# Patient Record
Sex: MALE | Race: WHITE | Employment: OTHER | URBAN - METROPOLITAN AREA
[De-identification: names, ages, dates, MRNs, and addresses within clinical notes are randomized per-mention and may not be internally consistent; named-entity substitution may affect disease eponyms.]

---

## 2017-01-03 ENCOUNTER — TELEPHONE (OUTPATIENT)
Dept: INTERNAL MEDICINE | Age: 82
End: 2017-01-03

## 2017-01-17 ENCOUNTER — NURSE ONLY (OUTPATIENT)
Dept: CARDIOLOGY CLINIC | Age: 82
End: 2017-01-17

## 2017-01-17 DIAGNOSIS — I47.29 PAROXYSMAL VENTRICULAR TACHYCARDIA: ICD-10-CM

## 2017-01-17 DIAGNOSIS — Z95.810 AUTOMATIC IMPLANTABLE CARDIOVERTER-DEFIBRILLATOR IN SITU: ICD-10-CM

## 2017-01-17 PROCEDURE — 93295 DEV INTERROG REMOTE 1/2/MLT: CPT | Performed by: INTERNAL MEDICINE

## 2017-01-17 PROCEDURE — 93296 REM INTERROG EVL PM/IDS: CPT | Performed by: INTERNAL MEDICINE

## 2017-01-19 ENCOUNTER — OFFICE VISIT (OUTPATIENT)
Dept: INTERNAL MEDICINE | Age: 82
End: 2017-01-19

## 2017-01-19 ENCOUNTER — CARE COORDINATOR VISIT (OUTPATIENT)
Dept: INTERNAL MEDICINE | Age: 82
End: 2017-01-19

## 2017-01-19 VITALS
SYSTOLIC BLOOD PRESSURE: 124 MMHG | HEIGHT: 67 IN | DIASTOLIC BLOOD PRESSURE: 70 MMHG | BODY MASS INDEX: 20.25 KG/M2 | WEIGHT: 129 LBS

## 2017-01-19 DIAGNOSIS — I10 ESSENTIAL HYPERTENSION: Primary | ICD-10-CM

## 2017-01-19 DIAGNOSIS — N18.4 CHRONIC KIDNEY DISEASE (CKD), STAGE IV (SEVERE) (HCC): ICD-10-CM

## 2017-01-19 DIAGNOSIS — I50.22 CHRONIC SYSTOLIC CONGESTIVE HEART FAILURE (HCC): ICD-10-CM

## 2017-01-19 DIAGNOSIS — D50.8 OTHER IRON DEFICIENCY ANEMIA: ICD-10-CM

## 2017-01-19 DIAGNOSIS — N18.4 CKD (CHRONIC KIDNEY DISEASE), STAGE 4 (SEVERE): ICD-10-CM

## 2017-01-19 DIAGNOSIS — I47.20 VENTRICULAR TACHYARRHYTHMIA: ICD-10-CM

## 2017-01-19 DIAGNOSIS — E03.4 HYPOTHYROIDISM DUE TO ACQUIRED ATROPHY OF THYROID: ICD-10-CM

## 2017-01-19 DIAGNOSIS — I47.1 PAT (PAROXYSMAL ATRIAL TACHYCARDIA) (HCC): ICD-10-CM

## 2017-01-19 LAB
ALBUMIN SERPL-MCNC: 3.6 G/DL (ref 3.4–5)
ANION GAP SERPL CALCULATED.3IONS-SCNC: 12 MMOL/L (ref 3–16)
BASOPHILS ABSOLUTE: 0 K/UL (ref 0–0.2)
BASOPHILS RELATIVE PERCENT: 0.7 %
BUN BLDV-MCNC: 23 MG/DL (ref 7–20)
CALCIUM SERPL-MCNC: 8.8 MG/DL (ref 8.3–10.6)
CHLORIDE BLD-SCNC: 102 MMOL/L (ref 99–110)
CO2: 30 MMOL/L (ref 21–32)
CREAT SERPL-MCNC: 1.6 MG/DL (ref 0.8–1.3)
EOSINOPHILS ABSOLUTE: 0.1 K/UL (ref 0–0.6)
EOSINOPHILS RELATIVE PERCENT: 2.4 %
GFR AFRICAN AMERICAN: 49
GFR NON-AFRICAN AMERICAN: 41
GLUCOSE BLD-MCNC: 74 MG/DL (ref 70–99)
HCT VFR BLD CALC: 34.3 % (ref 40.5–52.5)
HEMOGLOBIN: 11.3 G/DL (ref 13.5–17.5)
LYMPHOCYTES ABSOLUTE: 1.3 K/UL (ref 1–5.1)
LYMPHOCYTES RELATIVE PERCENT: 26 %
MCH RBC QN AUTO: 32.1 PG (ref 26–34)
MCHC RBC AUTO-ENTMCNC: 32.9 G/DL (ref 31–36)
MCV RBC AUTO: 97.5 FL (ref 80–100)
MONOCYTES ABSOLUTE: 0.6 K/UL (ref 0–1.3)
MONOCYTES RELATIVE PERCENT: 11.4 %
NEUTROPHILS ABSOLUTE: 2.9 K/UL (ref 1.7–7.7)
NEUTROPHILS RELATIVE PERCENT: 59.5 %
PDW BLD-RTO: 16.8 % (ref 12.4–15.4)
PHOSPHORUS: 3.7 MG/DL (ref 2.5–4.9)
PLATELET # BLD: 188 K/UL (ref 135–450)
PMV BLD AUTO: 8 FL (ref 5–10.5)
POTASSIUM SERPL-SCNC: 4.2 MMOL/L (ref 3.5–5.1)
RBC # BLD: 3.51 M/UL (ref 4.2–5.9)
SODIUM BLD-SCNC: 144 MMOL/L (ref 136–145)
T4 FREE: 1.5 NG/DL (ref 0.9–1.8)
TSH SERPL DL<=0.05 MIU/L-ACNC: 7.3 UIU/ML (ref 0.27–4.2)
WBC # BLD: 4.8 K/UL (ref 4–11)

## 2017-01-19 PROCEDURE — G8419 CALC BMI OUT NRM PARAM NOF/U: HCPCS | Performed by: INTERNAL MEDICINE

## 2017-01-19 PROCEDURE — 4040F PNEUMOC VAC/ADMIN/RCVD: CPT | Performed by: INTERNAL MEDICINE

## 2017-01-19 PROCEDURE — 99214 OFFICE O/P EST MOD 30 MIN: CPT | Performed by: INTERNAL MEDICINE

## 2017-01-19 PROCEDURE — G8598 ASA/ANTIPLAT THER USED: HCPCS | Performed by: INTERNAL MEDICINE

## 2017-01-19 PROCEDURE — G8484 FLU IMMUNIZE NO ADMIN: HCPCS | Performed by: INTERNAL MEDICINE

## 2017-01-19 PROCEDURE — 1123F ACP DISCUSS/DSCN MKR DOCD: CPT | Performed by: INTERNAL MEDICINE

## 2017-01-19 PROCEDURE — 1036F TOBACCO NON-USER: CPT | Performed by: INTERNAL MEDICINE

## 2017-01-19 PROCEDURE — G8427 DOCREV CUR MEDS BY ELIG CLIN: HCPCS | Performed by: INTERNAL MEDICINE

## 2017-01-19 ASSESSMENT — ENCOUNTER SYMPTOMS
ABDOMINAL PAIN: 0
BACK PAIN: 0
EYE REDNESS: 0
SHORTNESS OF BREATH: 0
CHEST TIGHTNESS: 0
NAUSEA: 0

## 2017-02-02 ENCOUNTER — TELEPHONE (OUTPATIENT)
Dept: INTERNAL MEDICINE | Age: 82
End: 2017-02-02

## 2017-02-17 ENCOUNTER — CARE COORDINATION (OUTPATIENT)
Dept: INTERNAL MEDICINE | Age: 82
End: 2017-02-17

## 2017-02-22 ENCOUNTER — TELEPHONE (OUTPATIENT)
Dept: INTERNAL MEDICINE | Age: 82
End: 2017-02-22

## 2017-02-22 RX ORDER — ALPRAZOLAM 0.25 MG/1
TABLET ORAL
Qty: 60 TABLET | Refills: 0 | Status: SHIPPED | OUTPATIENT
Start: 2017-02-22 | End: 2017-06-30 | Stop reason: SDUPTHER

## 2017-03-17 ENCOUNTER — CARE COORDINATOR VISIT (OUTPATIENT)
Dept: INTERNAL MEDICINE | Age: 82
End: 2017-03-17

## 2017-04-11 ENCOUNTER — CARE COORDINATION (OUTPATIENT)
Dept: INTERNAL MEDICINE | Age: 82
End: 2017-04-11

## 2017-04-13 ENCOUNTER — PROCEDURE VISIT (OUTPATIENT)
Dept: CARDIOLOGY CLINIC | Age: 82
End: 2017-04-13

## 2017-04-13 ENCOUNTER — TELEPHONE (OUTPATIENT)
Dept: INTERNAL MEDICINE | Age: 82
End: 2017-04-13

## 2017-04-13 ENCOUNTER — OFFICE VISIT (OUTPATIENT)
Dept: CARDIOLOGY CLINIC | Age: 82
End: 2017-04-13

## 2017-04-13 VITALS
BODY MASS INDEX: 20.18 KG/M2 | DIASTOLIC BLOOD PRESSURE: 70 MMHG | SYSTOLIC BLOOD PRESSURE: 122 MMHG | HEART RATE: 91 BPM | WEIGHT: 128.6 LBS

## 2017-04-13 DIAGNOSIS — Z95.810 AUTOMATIC IMPLANTABLE CARDIOVERTER-DEFIBRILLATOR IN SITU: ICD-10-CM

## 2017-04-13 DIAGNOSIS — I65.29 STENOSIS OF CAROTID ARTERY, UNSPECIFIED LATERALITY: Primary | Chronic | ICD-10-CM

## 2017-04-13 DIAGNOSIS — I47.29 PAROXYSMAL VENTRICULAR TACHYCARDIA: ICD-10-CM

## 2017-04-13 PROCEDURE — 93283 PRGRMG EVAL IMPLANTABLE DFB: CPT | Performed by: INTERNAL MEDICINE

## 2017-04-13 PROCEDURE — 99214 OFFICE O/P EST MOD 30 MIN: CPT | Performed by: INTERNAL MEDICINE

## 2017-04-13 PROCEDURE — G8427 DOCREV CUR MEDS BY ELIG CLIN: HCPCS | Performed by: INTERNAL MEDICINE

## 2017-04-13 PROCEDURE — G8598 ASA/ANTIPLAT THER USED: HCPCS | Performed by: INTERNAL MEDICINE

## 2017-04-13 PROCEDURE — 1123F ACP DISCUSS/DSCN MKR DOCD: CPT | Performed by: INTERNAL MEDICINE

## 2017-04-13 PROCEDURE — 1036F TOBACCO NON-USER: CPT | Performed by: INTERNAL MEDICINE

## 2017-04-13 PROCEDURE — 4040F PNEUMOC VAC/ADMIN/RCVD: CPT | Performed by: INTERNAL MEDICINE

## 2017-04-13 PROCEDURE — G8419 CALC BMI OUT NRM PARAM NOF/U: HCPCS | Performed by: INTERNAL MEDICINE

## 2017-04-19 ENCOUNTER — OFFICE VISIT (OUTPATIENT)
Dept: INTERNAL MEDICINE | Age: 82
End: 2017-04-19

## 2017-04-19 VITALS
DIASTOLIC BLOOD PRESSURE: 70 MMHG | SYSTOLIC BLOOD PRESSURE: 140 MMHG | WEIGHT: 123 LBS | BODY MASS INDEX: 19.77 KG/M2 | HEIGHT: 66 IN

## 2017-04-19 DIAGNOSIS — F41.9 ANXIETY: ICD-10-CM

## 2017-04-19 DIAGNOSIS — I10 ESSENTIAL HYPERTENSION: Primary | ICD-10-CM

## 2017-04-19 PROCEDURE — 1036F TOBACCO NON-USER: CPT | Performed by: INTERNAL MEDICINE

## 2017-04-19 PROCEDURE — G8419 CALC BMI OUT NRM PARAM NOF/U: HCPCS | Performed by: INTERNAL MEDICINE

## 2017-04-19 PROCEDURE — G8427 DOCREV CUR MEDS BY ELIG CLIN: HCPCS | Performed by: INTERNAL MEDICINE

## 2017-04-19 PROCEDURE — G8598 ASA/ANTIPLAT THER USED: HCPCS | Performed by: INTERNAL MEDICINE

## 2017-04-19 PROCEDURE — 4040F PNEUMOC VAC/ADMIN/RCVD: CPT | Performed by: INTERNAL MEDICINE

## 2017-04-19 PROCEDURE — 1123F ACP DISCUSS/DSCN MKR DOCD: CPT | Performed by: INTERNAL MEDICINE

## 2017-04-19 PROCEDURE — 99214 OFFICE O/P EST MOD 30 MIN: CPT | Performed by: INTERNAL MEDICINE

## 2017-04-19 ASSESSMENT — ENCOUNTER SYMPTOMS
EYE REDNESS: 0
BACK PAIN: 0
CHEST TIGHTNESS: 0
ABDOMINAL PAIN: 0
SHORTNESS OF BREATH: 0
NAUSEA: 0

## 2017-06-08 ENCOUNTER — CARE COORDINATION (OUTPATIENT)
Dept: INTERNAL MEDICINE | Age: 82
End: 2017-06-08

## 2017-06-11 ENCOUNTER — CARE COORDINATION (OUTPATIENT)
Dept: CASE MANAGEMENT | Age: 82
End: 2017-06-11

## 2017-06-12 ENCOUNTER — TELEPHONE (OUTPATIENT)
Dept: PHARMACY | Facility: CLINIC | Age: 82
End: 2017-06-12

## 2017-06-13 ENCOUNTER — CARE COORDINATION (OUTPATIENT)
Dept: CASE MANAGEMENT | Age: 82
End: 2017-06-13

## 2017-06-13 ENCOUNTER — TELEPHONE (OUTPATIENT)
Dept: INTERNAL MEDICINE | Age: 82
End: 2017-06-13

## 2017-06-16 ENCOUNTER — CARE COORDINATION (OUTPATIENT)
Dept: INTERNAL MEDICINE | Age: 82
End: 2017-06-16

## 2017-06-16 ENCOUNTER — OFFICE VISIT (OUTPATIENT)
Dept: INTERNAL MEDICINE | Age: 82
End: 2017-06-16

## 2017-06-16 VITALS
DIASTOLIC BLOOD PRESSURE: 64 MMHG | HEIGHT: 66 IN | WEIGHT: 125 LBS | SYSTOLIC BLOOD PRESSURE: 130 MMHG | BODY MASS INDEX: 20.09 KG/M2

## 2017-06-16 DIAGNOSIS — I50.33 ACUTE ON CHRONIC DIASTOLIC HEART FAILURE (HCC): Primary | ICD-10-CM

## 2017-06-16 PROCEDURE — 99496 TRANSJ CARE MGMT HIGH F2F 7D: CPT | Performed by: INTERNAL MEDICINE

## 2017-06-16 ASSESSMENT — ENCOUNTER SYMPTOMS
NAUSEA: 0
BACK PAIN: 0
ABDOMINAL PAIN: 0
CHEST TIGHTNESS: 0
EYE REDNESS: 0
SHORTNESS OF BREATH: 0

## 2017-06-20 ENCOUNTER — CARE COORDINATION (OUTPATIENT)
Dept: CASE MANAGEMENT | Age: 82
End: 2017-06-20

## 2017-06-27 ENCOUNTER — PROCEDURE VISIT (OUTPATIENT)
Dept: CARDIOLOGY CLINIC | Age: 82
End: 2017-06-27

## 2017-06-27 ENCOUNTER — OFFICE VISIT (OUTPATIENT)
Dept: CARDIOLOGY CLINIC | Age: 82
End: 2017-06-27

## 2017-06-27 VITALS
HEIGHT: 68 IN | RESPIRATION RATE: 16 BRPM | HEART RATE: 88 BPM | SYSTOLIC BLOOD PRESSURE: 116 MMHG | BODY MASS INDEX: 19.04 KG/M2 | DIASTOLIC BLOOD PRESSURE: 64 MMHG | WEIGHT: 125.6 LBS

## 2017-06-27 DIAGNOSIS — I47.29 PAROXYSMAL VENTRICULAR TACHYCARDIA: ICD-10-CM

## 2017-06-27 DIAGNOSIS — I25.10 CORONARY ARTERY DISEASE INVOLVING NATIVE HEART WITHOUT ANGINA PECTORIS, UNSPECIFIED VESSEL OR LESION TYPE: ICD-10-CM

## 2017-06-27 DIAGNOSIS — I50.42 CHRONIC COMBINED SYSTOLIC AND DIASTOLIC CONGESTIVE HEART FAILURE (HCC): Primary | ICD-10-CM

## 2017-06-27 DIAGNOSIS — I42.9 CARDIOMYOPATHY (HCC): ICD-10-CM

## 2017-06-27 DIAGNOSIS — Z95.810 AUTOMATIC IMPLANTABLE CARDIOVERTER-DEFIBRILLATOR IN SITU: ICD-10-CM

## 2017-06-27 DIAGNOSIS — Z95.810 ICD (IMPLANTABLE CARDIOVERTER-DEFIBRILLATOR), DUAL, IN SITU: ICD-10-CM

## 2017-06-27 PROCEDURE — G8427 DOCREV CUR MEDS BY ELIG CLIN: HCPCS | Performed by: NURSE PRACTITIONER

## 2017-06-27 PROCEDURE — 93283 PRGRMG EVAL IMPLANTABLE DFB: CPT | Performed by: INTERNAL MEDICINE

## 2017-06-27 PROCEDURE — 4040F PNEUMOC VAC/ADMIN/RCVD: CPT | Performed by: NURSE PRACTITIONER

## 2017-06-27 PROCEDURE — 99214 OFFICE O/P EST MOD 30 MIN: CPT | Performed by: NURSE PRACTITIONER

## 2017-06-27 PROCEDURE — G8598 ASA/ANTIPLAT THER USED: HCPCS | Performed by: NURSE PRACTITIONER

## 2017-06-27 PROCEDURE — 1123F ACP DISCUSS/DSCN MKR DOCD: CPT | Performed by: NURSE PRACTITIONER

## 2017-06-27 PROCEDURE — 1111F DSCHRG MED/CURRENT MED MERGE: CPT | Performed by: NURSE PRACTITIONER

## 2017-06-27 PROCEDURE — 1036F TOBACCO NON-USER: CPT | Performed by: NURSE PRACTITIONER

## 2017-06-27 PROCEDURE — G8420 CALC BMI NORM PARAMETERS: HCPCS | Performed by: NURSE PRACTITIONER

## 2017-06-28 ENCOUNTER — CARE COORDINATION (OUTPATIENT)
Dept: INTERNAL MEDICINE | Age: 82
End: 2017-06-28

## 2017-06-30 RX ORDER — ALPRAZOLAM 0.25 MG/1
TABLET ORAL
Qty: 60 TABLET | Refills: 1 | OUTPATIENT
Start: 2017-06-30 | End: 2017-11-02 | Stop reason: SDUPTHER

## 2017-07-13 ENCOUNTER — CARE COORDINATION (OUTPATIENT)
Dept: INTERNAL MEDICINE | Age: 82
End: 2017-07-13

## 2017-08-01 RX ORDER — METOPROLOL SUCCINATE 25 MG/1
TABLET, EXTENDED RELEASE ORAL
Qty: 60 TABLET | Refills: 10 | Status: SHIPPED | OUTPATIENT
Start: 2017-08-01 | End: 2017-11-02 | Stop reason: SDUPTHER

## 2017-08-24 ENCOUNTER — CARE COORDINATION (OUTPATIENT)
Dept: INTERNAL MEDICINE | Age: 82
End: 2017-08-24

## 2017-09-27 ENCOUNTER — CARE COORDINATOR VISIT (OUTPATIENT)
Dept: INTERNAL MEDICINE | Age: 82
End: 2017-09-27

## 2017-10-19 ENCOUNTER — PROCEDURE VISIT (OUTPATIENT)
Dept: CARDIOLOGY CLINIC | Age: 82
End: 2017-10-19

## 2017-10-19 DIAGNOSIS — Z95.810 AUTOMATIC IMPLANTABLE CARDIOVERTER-DEFIBRILLATOR IN SITU: ICD-10-CM

## 2017-10-19 DIAGNOSIS — I47.29 PAROXYSMAL VENTRICULAR TACHYCARDIA: ICD-10-CM

## 2017-10-19 PROCEDURE — 93283 PRGRMG EVAL IMPLANTABLE DFB: CPT | Performed by: INTERNAL MEDICINE

## 2017-10-20 ENCOUNTER — OFFICE VISIT (OUTPATIENT)
Dept: INTERNAL MEDICINE | Age: 82
End: 2017-10-20

## 2017-10-20 VITALS
HEART RATE: 77 BPM | SYSTOLIC BLOOD PRESSURE: 148 MMHG | DIASTOLIC BLOOD PRESSURE: 86 MMHG | WEIGHT: 133.6 LBS | RESPIRATION RATE: 18 BRPM | OXYGEN SATURATION: 98 % | BODY MASS INDEX: 20.31 KG/M2

## 2017-10-20 DIAGNOSIS — F41.9 ANXIETY: ICD-10-CM

## 2017-10-20 DIAGNOSIS — I10 ESSENTIAL HYPERTENSION: Primary | ICD-10-CM

## 2017-10-20 PROCEDURE — 1123F ACP DISCUSS/DSCN MKR DOCD: CPT | Performed by: INTERNAL MEDICINE

## 2017-10-20 PROCEDURE — G8598 ASA/ANTIPLAT THER USED: HCPCS | Performed by: INTERNAL MEDICINE

## 2017-10-20 PROCEDURE — G8427 DOCREV CUR MEDS BY ELIG CLIN: HCPCS | Performed by: INTERNAL MEDICINE

## 2017-10-20 PROCEDURE — 4040F PNEUMOC VAC/ADMIN/RCVD: CPT | Performed by: INTERNAL MEDICINE

## 2017-10-20 PROCEDURE — G8484 FLU IMMUNIZE NO ADMIN: HCPCS | Performed by: INTERNAL MEDICINE

## 2017-10-20 PROCEDURE — 99213 OFFICE O/P EST LOW 20 MIN: CPT | Performed by: INTERNAL MEDICINE

## 2017-10-20 PROCEDURE — 1036F TOBACCO NON-USER: CPT | Performed by: INTERNAL MEDICINE

## 2017-10-20 PROCEDURE — G8420 CALC BMI NORM PARAMETERS: HCPCS | Performed by: INTERNAL MEDICINE

## 2017-10-20 ASSESSMENT — ENCOUNTER SYMPTOMS
ABDOMINAL PAIN: 0
EYE REDNESS: 0
NAUSEA: 0
BACK PAIN: 0
CHEST TIGHTNESS: 0
SHORTNESS OF BREATH: 0

## 2017-10-20 NOTE — PROGRESS NOTES
Subjective:      Patient ID: Ewa Boucher is a 80 y.o. male. Hypertension   This is a chronic problem. The current episode started more than 1 year ago. The problem is controlled. Pertinent negatives include no chest pain, headaches, neck pain, palpitations, peripheral edema or shortness of breath. There are no associated agents to hypertension. Past treatments include beta blockers and diuretics. The current treatment provides no improvement. There are no compliance problems. Current Outpatient Prescriptions on File Prior to Visit   Medication Sig Dispense Refill    metoprolol succinate (TOPROL XL) 25 MG extended release tablet TAKE 1 TABLET BY MOUTH 2 TIMES DAILY 60 tablet 10    ALPRAZolam (XANAX) 0.25 MG tablet TAKE 1 TABLET BY MOUTH TWO TIMES DAILY AS NEEDED FOR SLEEP/ANXIETY 60 tablet 1    furosemide (LASIX) 40 MG tablet Take 1 tablet by mouth daily 60 tablet 3    Acetaminophen (APAP) 325 MG TABS Take 650 mg by mouth every 6 hours as needed      Melatonin 5 MG CAPS TAKE 1 CAPSULE BY MOUTH NIGHTLY AS NEEDED FOR SLEEP 30 capsule 5    allopurinol (ZYLOPRIM) 100 MG tablet Take 1 tablet by mouth daily 90 tablet 3    levothyroxine (SYNTHROID) 100 MCG tablet Take 1 tablet by mouth daily 90 tablet 3    fluticasone (FLONASE) 50 MCG/ACT nasal spray 2 sprays by Nasal route daily 1 Bottle 3    escitalopram (LEXAPRO) 5 MG tablet TAKE 1 TABLET BY MOUTH DAILY TO HELP ANXIETY 90 tablet 5    Multiple Vitamins-Minerals (CENTRUM SILVER PO) Take by mouth daily      atorvastatin (LIPITOR) 40 MG tablet Take 40 mg by mouth every morning.  clopidogrel (PLAVIX) 75 MG tablet Take 75 mg by mouth daily.  aspirin 81 MG EC tablet Take 81 mg by mouth daily.  fenofibrate (TRICOR) 145 MG tablet Take 145 mg by mouth daily.  Cholecalciferol (VITAMIN D) 1000 UNIT TABS Take 1 tablet by mouth daily.  fish oil-omega-3 fatty acids 1000 MG capsule Take 2 g by mouth daily.        No current

## 2017-11-01 ENCOUNTER — TELEPHONE (OUTPATIENT)
Dept: INTERNAL MEDICINE | Age: 82
End: 2017-11-01

## 2017-11-03 RX ORDER — ATORVASTATIN CALCIUM 40 MG/1
40 TABLET, FILM COATED ORAL EVERY MORNING
Qty: 90 TABLET | Refills: 3 | Status: SHIPPED | OUTPATIENT
Start: 2017-11-03 | End: 2018-10-01 | Stop reason: SDUPTHER

## 2017-11-03 RX ORDER — FUROSEMIDE 40 MG/1
40 TABLET ORAL DAILY
Qty: 90 TABLET | Refills: 3 | Status: ON HOLD | OUTPATIENT
Start: 2017-11-03 | End: 2017-12-14 | Stop reason: HOSPADM

## 2017-11-03 RX ORDER — FLUTICASONE PROPIONATE 50 MCG
SPRAY, SUSPENSION (ML) NASAL
Qty: 3 BOTTLE | Refills: 3 | Status: SHIPPED | OUTPATIENT
Start: 2017-11-03

## 2017-11-03 RX ORDER — ESCITALOPRAM OXALATE 5 MG/1
TABLET ORAL
Qty: 90 TABLET | Refills: 3 | Status: SHIPPED | OUTPATIENT
Start: 2017-11-03 | End: 2018-09-16 | Stop reason: SDUPTHER

## 2017-11-03 RX ORDER — FLUTICASONE PROPIONATE 50 MCG
2 SPRAY, SUSPENSION (ML) NASAL DAILY
Qty: 1 BOTTLE | Refills: 3 | Status: SHIPPED | OUTPATIENT
Start: 2017-11-03 | End: 2017-11-03 | Stop reason: SDUPTHER

## 2017-11-03 RX ORDER — LEVOTHYROXINE SODIUM 0.1 MG/1
100 TABLET ORAL DAILY
Qty: 90 TABLET | Refills: 3 | Status: SHIPPED | OUTPATIENT
Start: 2017-11-03 | End: 2018-12-01 | Stop reason: SDUPTHER

## 2017-11-03 RX ORDER — METOPROLOL SUCCINATE 25 MG/1
TABLET, EXTENDED RELEASE ORAL
Qty: 180 TABLET | Refills: 3 | Status: ON HOLD | OUTPATIENT
Start: 2017-11-03 | End: 2017-12-14 | Stop reason: HOSPADM

## 2017-11-03 RX ORDER — ALPRAZOLAM 0.25 MG/1
TABLET ORAL
Qty: 60 TABLET | Refills: 1 | Status: SHIPPED | OUTPATIENT
Start: 2017-11-03 | End: 2018-03-19 | Stop reason: SDUPTHER

## 2017-11-03 RX ORDER — ALLOPURINOL 100 MG/1
100 TABLET ORAL DAILY
Qty: 90 TABLET | Refills: 3 | Status: SHIPPED | OUTPATIENT
Start: 2017-11-03 | End: 2018-09-26 | Stop reason: SDUPTHER

## 2017-11-03 RX ORDER — ALPRAZOLAM 0.25 MG/1
TABLET ORAL
Qty: 60 TABLET | Refills: 0 | Status: SHIPPED | OUTPATIENT
Start: 2017-11-03 | End: 2017-12-11 | Stop reason: SDUPTHER

## 2017-11-08 ENCOUNTER — TELEPHONE (OUTPATIENT)
Dept: INTERNAL MEDICINE | Age: 82
End: 2017-11-08

## 2017-11-08 RX ORDER — ASPIRIN 81 MG/1
81 TABLET ORAL DAILY
Qty: 90 TABLET | Refills: 3 | Status: SHIPPED | OUTPATIENT
Start: 2017-11-08 | End: 2018-12-09 | Stop reason: SDUPTHER

## 2017-11-08 RX ORDER — MULTIVIT WITH MINERALS/LUTEIN
1 TABLET ORAL DAILY
Qty: 90 TABLET | Refills: 3 | Status: SHIPPED | OUTPATIENT
Start: 2017-11-08 | End: 2018-09-26 | Stop reason: SDUPTHER

## 2017-11-08 RX ORDER — FENOFIBRATE 145 MG/1
TABLET, COATED ORAL
Qty: 90 TABLET | Refills: 1 | Status: ON HOLD | OUTPATIENT
Start: 2017-11-08 | End: 2017-12-13 | Stop reason: ALTCHOICE

## 2017-11-08 RX ORDER — CLOPIDOGREL BISULFATE 75 MG/1
75 TABLET ORAL DAILY
Qty: 90 TABLET | Refills: 3 | Status: SHIPPED | OUTPATIENT
Start: 2017-11-08 | End: 2019-01-24 | Stop reason: SDUPTHER

## 2017-11-14 ENCOUNTER — CARE COORDINATION (OUTPATIENT)
Dept: INTERNAL MEDICINE | Age: 82
End: 2017-11-14

## 2017-11-17 ENCOUNTER — TELEPHONE (OUTPATIENT)
Dept: INTERNAL MEDICINE | Age: 82
End: 2017-11-17

## 2017-11-17 NOTE — TELEPHONE ENCOUNTER
Orly  calling from the [address] [facility] and can be reached at [phone]  Pt called needs RX for. .fenofibrate (TRICOR) 145 MG tablet  Advised Pt to check with Pharm in 24-48  hours . ..  Pls fax script  to [phone]

## 2017-12-08 ENCOUNTER — TELEPHONE (OUTPATIENT)
Dept: INTERNAL MEDICINE | Age: 82
End: 2017-12-08

## 2017-12-08 RX ORDER — FAMOTIDINE 20 MG/1
20 TABLET, FILM COATED ORAL DAILY PRN
Qty: 30 TABLET | Refills: 3 | Status: ON HOLD | OUTPATIENT
Start: 2017-12-08 | End: 2017-12-08 | Stop reason: SDUPTHER

## 2017-12-08 NOTE — TELEPHONE ENCOUNTER
Doug of [place] nurse stated that pt is experiencing episodes of Acid Reflex and needs something for it.   Pharmacy on file verified,  Please call

## 2017-12-11 ENCOUNTER — OFFICE VISIT (OUTPATIENT)
Dept: INTERNAL MEDICINE | Age: 82
End: 2017-12-11

## 2017-12-11 VITALS
DIASTOLIC BLOOD PRESSURE: 68 MMHG | SYSTOLIC BLOOD PRESSURE: 148 MMHG | HEART RATE: 122 BPM | HEIGHT: 66 IN | OXYGEN SATURATION: 86 % | BODY MASS INDEX: 20.57 KG/M2 | WEIGHT: 128 LBS

## 2017-12-11 DIAGNOSIS — I48.91 NEW ONSET ATRIAL FIBRILLATION (HCC): Primary | ICD-10-CM

## 2017-12-11 DIAGNOSIS — I50.33 ACUTE ON CHRONIC DIASTOLIC HEART FAILURE (HCC): ICD-10-CM

## 2017-12-11 DIAGNOSIS — R06.02 SHORTNESS OF BREATH: ICD-10-CM

## 2017-12-11 PROCEDURE — G8427 DOCREV CUR MEDS BY ELIG CLIN: HCPCS | Performed by: INTERNAL MEDICINE

## 2017-12-11 PROCEDURE — 1036F TOBACCO NON-USER: CPT | Performed by: INTERNAL MEDICINE

## 2017-12-11 PROCEDURE — G8420 CALC BMI NORM PARAMETERS: HCPCS | Performed by: INTERNAL MEDICINE

## 2017-12-11 PROCEDURE — 93000 ELECTROCARDIOGRAM COMPLETE: CPT | Performed by: INTERNAL MEDICINE

## 2017-12-11 PROCEDURE — 99213 OFFICE O/P EST LOW 20 MIN: CPT | Performed by: INTERNAL MEDICINE

## 2017-12-11 PROCEDURE — G8484 FLU IMMUNIZE NO ADMIN: HCPCS | Performed by: INTERNAL MEDICINE

## 2017-12-11 PROCEDURE — G8598 ASA/ANTIPLAT THER USED: HCPCS | Performed by: INTERNAL MEDICINE

## 2017-12-11 PROCEDURE — 1123F ACP DISCUSS/DSCN MKR DOCD: CPT | Performed by: INTERNAL MEDICINE

## 2017-12-11 PROCEDURE — 4040F PNEUMOC VAC/ADMIN/RCVD: CPT | Performed by: INTERNAL MEDICINE

## 2017-12-11 ASSESSMENT — ENCOUNTER SYMPTOMS
SHORTNESS OF BREATH: 1
SORE THROAT: 0
ABDOMINAL PAIN: 0
SINUS PRESSURE: 0
EYE REDNESS: 0
BACK PAIN: 0
SINUS PAIN: 0
NAUSEA: 0
CHEST TIGHTNESS: 0
COUGH: 1

## 2017-12-11 NOTE — PROGRESS NOTES
Subjective:      Patient ID: Gisel Ellington is a 80 y.o. male. Chief Complaint   Patient presents with    Shortness of Breath     started with SOB 1 week ago       HPI     Cristina Hicks presented to the office today with complaints of shortness of breath going on for the last week. He denied any fever or chills, or any swelling in his lower extremities. He has had a slight cough which is nonproductive. There has been no confusion. He and his wife have been confined to the room for the last 3-4 days due to a GI illness going around their assisted living facility. Cristina Hicks had a past medical history of paroxysmal atrial fibrillation, hypertension, congestive heart failure. No current facility-administered medications on file prior to visit.       Current Outpatient Prescriptions on File Prior to Visit   Medication Sig Dispense Refill    famotidine (PEPCID) 20 MG tablet Take 1 tablet by mouth daily as needed (reflux) 30 tablet 3    fenofibrate (TRICOR) 145 MG tablet TAKE 1 TABLET BY MOUTH DAILY 90 tablet 1    aspirin 81 MG EC tablet Take 1 tablet by mouth daily 90 tablet 3    Multiple Vitamins-Minerals (CENTRUM SILVER) TABS Take 1 tablet by mouth daily 90 tablet 3    clopidogrel (PLAVIX) 75 MG tablet Take 1 tablet by mouth daily 90 tablet 3    Acetaminophen (APAP) 325 MG TABS Take 650 mg by mouth every 6 hours as needed for Pain or Fever 100 tablet 3    levothyroxine (SYNTHROID) 100 MCG tablet Take 1 tablet by mouth daily 90 tablet 3    escitalopram (LEXAPRO) 5 MG tablet TAKE 1 TABLET BY MOUTH DAILY TO HELP ANXIETY 90 tablet 3    atorvastatin (LIPITOR) 40 MG tablet Take 1 tablet by mouth every morning 90 tablet 3    metoprolol succinate (TOPROL XL) 25 MG extended release tablet TAKE 1 TABLET BY MOUTH 2 TIMES DAILY 180 tablet 3    ALPRAZolam (XANAX) 0.25 MG tablet TAKE 1 TABLET BY MOUTH TWO TIMES DAILY AS NEEDED FOR SLEEP/ANXIETY 60 tablet 1    furosemide (LASIX) 40 MG tablet Take 1 tablet by mouth daily 90 tablet 3  allopurinol (ZYLOPRIM) 100 MG tablet Take 1 tablet by mouth daily 90 tablet 3    Melatonin 5 MG CAPS TAKE 1 CAPSULE BY MOUTH NIGHTLY AS NEEDED FOR SLEEP 90 capsule 3    fluticasone (FLONASE) 50 MCG/ACT nasal spray USE 2SPRAYS BY NASAL ROUTE DAILY 3 Bottle 3    Cholecalciferol (VITAMIN D) 1000 UNIT TABS Take 1 tablet by mouth daily.  fish oil-omega-3 fatty acids 1000 MG capsule Take 2 g by mouth daily. Review of Systems   Constitutional: Positive for fatigue. Negative for chills, diaphoresis, fever and unexpected weight change. HENT: Negative for hearing loss, postnasal drip, sinus pain, sinus pressure, sneezing and sore throat. Eyes: Negative for redness and visual disturbance. Respiratory: Positive for cough and shortness of breath. Negative for chest tightness. Cardiovascular: Negative for chest pain and palpitations. Gastrointestinal: Negative for abdominal pain and nausea. Endocrine: Negative for polydipsia and polyuria. Genitourinary: Negative for dysuria and hematuria. Musculoskeletal: Negative for arthralgias, back pain and neck pain. Skin: Negative for rash and wound. Neurological: Negative for dizziness and headaches. Hematological: Negative for adenopathy. Does not bruise/bleed easily. Psychiatric/Behavioral: Negative for agitation. The patient is not nervous/anxious. Objective:   Physical Exam   Constitutional: He is oriented to person, place, and time. He appears well-developed and well-nourished. Tired looking      Cardiovascular:   Irregular fast pulse    Pulmonary/Chest: Effort normal and breath sounds normal.   Right basilar dullness    Musculoskeletal: He exhibits no edema. Neurological: He is alert and oriented to person, place, and time. Psychiatric: He has a normal mood and affect. Assessment and plan       1. New onset atrial fibrillation (HCC)  Increased heart rate. Irregular rhythm. Suspect recurrence of atrial fibrillation. Refer to emergency room. 2. Acute on chronic diastolic heart failure (HCC)  Increased shortness breath for one week. Likely some element of congestive heart failure. Decreased pulse ox noted today, 86%. Patient referred to the emergency room. 3. Shortness of breath  Recent increased shortness of breath. Rapid atrial fibrillation detected. Refer patient to the emergency room.   - EKG 12 Lead

## 2017-12-12 RX ORDER — FAMOTIDINE 20 MG/1
TABLET, FILM COATED ORAL
Qty: 90 TABLET | Refills: 3 | Status: SHIPPED | OUTPATIENT
Start: 2017-12-12

## 2017-12-15 ENCOUNTER — CARE COORDINATION (OUTPATIENT)
Dept: CASE MANAGEMENT | Age: 82
End: 2017-12-15

## 2017-12-15 NOTE — CARE COORDINATION
Berta Charles, BETSY [facility]   12/29/2017 10:15 AM Lucia GRUBER [facility]   12/29/2017 10:45 AM MD Cruzito Lantigua [facility]   1/18/2018 1:30 PM Nelda Salas MD [facility]   1/26/2018 10:20 AM Claudia Umaña MD [facility] [address] IM [facility]   4/24/2018 10:00 AM SCHEDULE, Ariel Copping PHONE TRANSMISSION [facility]       Elmore Barthel, RN

## 2017-12-19 ENCOUNTER — CARE COORDINATION (OUTPATIENT)
Dept: CASE MANAGEMENT | Age: 82
End: 2017-12-19

## 2017-12-19 ENCOUNTER — CARE COORDINATION (OUTPATIENT)
Dept: CARE COORDINATION | Age: 82
End: 2017-12-19

## 2017-12-19 NOTE — PATIENT INSTRUCTIONS
[employer]. Care instructions adapted under license by [facility] ([facility]). If you have questions about a medical condition or this instruction, always ask your healthcare professional. [address] any warranty or liability for your use of this information.

## 2017-12-19 NOTE — CARE COORDINATION
[facility] Transitions Follow Up Call    2017    Patient: Diana Kaur  Patient : 1925   MRN: [id number]  Reason for Admission: CHF. New onset at fib  Discharge Date: 17 RARS: Risk Score: 26.75       Spoke with: spouse. Care Transitions Subsequent and Final Call    Subsequent and Final Calls  Care Transitions Interventions  Other Interventions:        Care Transition f/u call to pt home. Spouse answered the phone. Said pt not available. She said pt is doing okay. Asked what his wt was this morning and she didn't know but she said pt checks his wt daily. Reviewed baseline wt 122# and to call for 3# wt gain 1 day or 5# wt gain in 1 week. Reviewed upcoming appt with cardiology for this . She wasn't aware of the appt but said pt likely knows about it.      Follow Up  Future Appointments  Date Time Provider Gwen Perez   2017 2:30 PM Tonny Mcmillan NP [facility]   2017 10:15 AM Madison Favor PACERS [facility]   2017 10:45 AM You Rojas MD [facility]   2018 1:30 PM Pippa Wheat MD [facility] Neph[facility]   2018 10:20 AM [facility]   2018 10:00 AM SCHEDULE, [place] PHONE TRANSMISSION [facility]       Madyson Issa RN

## 2017-12-19 NOTE — CARE COORDINATION
Ambulatory Care Coordination Note  12/19/2017  CM Risk Score: 3  Estuardo Mortality Risk Score: 56.08    ACC: Natalie Apple, CHELA     Hx: CAD, HTN, HLD, CKD Stage 4, A-Fib, V-Tach, AICD, CHF, Hypothroidism, Anemia    Summary Note: The pt was sleeping. The wife stated the pt is breathing better and stated he feels better. The wife stated the pt still has fatigue and little energy. The wife stated the pt has not had any SOB, chest pain or swelling. The wife stated the pt has a good appetite. Plan: The wife stated the pt plans to slowly return to his routine and start walking again. The [address] mailed the pt a handout on Learning About Atrial Fibrillation. The [address] instructed the wife to call the PCP or Cardiologist if the pt begins to have signs of CHF again. The pt has information on CHF and Heart Failure zones. Care Coordination Interventions    Program Enrollment:  Rising Risk  Referral from Primary Care Provider:  Yes  Suggested Interventions and Community Resources  Fall Risk Prevention: In Process  Medi Set or Pill Pack: In Process  Pharmacist:  Declined  Other Services: In Process (Comment: Assistance by [employer])  Zone Management Tools: In Process         Goals Addressed             Most Recent     Care Coordination Self Management   On track (12/19/2017)             CC Self Management Goal: To live as independently as possible  Patient Goal (What steps will patient take to achieve goal?): Pt moved to a [facility] community that has staff available if needed. The pt continues with his routine follow up appointments.   Patient is able to discuss self-management of condition(s):  Pt demonstrates adherence to medications  Pt demonstrates understanding of self-monitoring  Patient is able to identify Red Flags:  Alert to potential adverse drug reactions(s) or side effects and actions to take should they arise  Discuss target symptoms and actions to take should they arise  Identify problems that daily 11/3/17   Omar Martinez MD   escitalopram (LEXAPRO) 5 MG tablet TAKE 1 TABLET BY MOUTH DAILY TO HELP ANXIETY 11/3/17   Elma Schwartz MD   atorvastatin (LIPITOR) 40 MG tablet Take 1 tablet by mouth every morning 11/3/17   Elma Schwartz MD   ALPRAZolam [place] [facility]) 0.25 MG tablet TAKE 1 TABLET BY MOUTH TWO TIMES DAILY AS NEEDED FOR SLEEP/ANXIETY 11/3/17   Elma Schwartz MD   allopurinol (ZYLOPRIM) 100 MG tablet Take 1 tablet by mouth daily 11/3/17   Elma Schwartz MD   Melatonin 5 MG CAPS TAKE 1 CAPSULE BY MOUTH NIGHTLY AS NEEDED FOR SLEEP 11/3/17   Elma Schwartz MD   fluticasone [facility]) 50 MCG/ACT nasal spray USE 2SPRAYS BY NASAL ROUTE DAILY 11/3/17   Elma Schwartz MD   Cholecalciferol (VITAMIN D) 1000 UNIT TABS Take 1 tablet by mouth daily. 7/20/10   Historical Provider, MD   fish oil-omega-3 fatty acids 1000 MG capsule Take 2 g by mouth daily.     Historical Provider, MD       Future Appointments  Date Time Provider Gwen Perez   12/21/2017 2:30 PM Nieves Miguel NP [facility]   12/29/2017 10:15 AM Betzaida GRUBER [place] [facility]   12/29/2017 10:45 AM Michael Kenney MD [place] [facility]   1/18/2018 1:30 PM Samir Johnson MD [facility] Nephrolo   1/26/2018 10:20 AM Elma Schwartz MD [facility] [address] IM [facility]   4/24/2018 10:00 AM SCHEDULE, [place] PHONE TRANSMISSION [place] [facility]     General Assessment    Do you have any symptoms that are causing concern?:  No      and   Congestive Heart Failure Assessment    Are you currently restricting fluids?:  No Restriction  Do you understand a low sodium diet?:  Yes  Do you understand how to read food labels?:  Yes  How many restaurant meals do you eat per week?:  0  Do you salt your food before tasting it?:  No     No patient-reported symptoms      Symptoms:   CHF associated fatigue: Pos      Symptom course:  stable  Weight trend:  stable  Salt intake watch compared to last visit:  stable     ,

## 2017-12-27 ENCOUNTER — CARE COORDINATION (OUTPATIENT)
Dept: CASE MANAGEMENT | Age: 82
End: 2017-12-27

## 2018-01-02 ENCOUNTER — CARE COORDINATION (OUTPATIENT)
Dept: CARE COORDINATION | Age: 83
End: 2018-01-02

## 2018-01-02 NOTE — CARE COORDINATION
Ok , sounds good.
advised to contact PCP office to have HM items/records faxed to PCP Office directly?   N/A

## 2018-01-26 ENCOUNTER — OFFICE VISIT (OUTPATIENT)
Dept: INTERNAL MEDICINE | Age: 83
End: 2018-01-26

## 2018-01-26 VITALS
DIASTOLIC BLOOD PRESSURE: 68 MMHG | BODY MASS INDEX: 19.25 KG/M2 | SYSTOLIC BLOOD PRESSURE: 132 MMHG | WEIGHT: 127 LBS | HEART RATE: 80 BPM | HEIGHT: 68 IN | OXYGEN SATURATION: 94 %

## 2018-01-26 DIAGNOSIS — I50.32 CHRONIC DIASTOLIC CONGESTIVE HEART FAILURE (HCC): ICD-10-CM

## 2018-01-26 DIAGNOSIS — I10 ESSENTIAL HYPERTENSION: Primary | ICD-10-CM

## 2018-01-26 DIAGNOSIS — R13.12 OROPHARYNGEAL DYSPHAGIA: ICD-10-CM

## 2018-01-26 DIAGNOSIS — F41.9 ANXIETY: ICD-10-CM

## 2018-01-26 PROCEDURE — G8598 ASA/ANTIPLAT THER USED: HCPCS | Performed by: INTERNAL MEDICINE

## 2018-01-26 PROCEDURE — 1036F TOBACCO NON-USER: CPT | Performed by: INTERNAL MEDICINE

## 2018-01-26 PROCEDURE — 99214 OFFICE O/P EST MOD 30 MIN: CPT | Performed by: INTERNAL MEDICINE

## 2018-01-26 PROCEDURE — 1123F ACP DISCUSS/DSCN MKR DOCD: CPT | Performed by: INTERNAL MEDICINE

## 2018-01-26 PROCEDURE — G8484 FLU IMMUNIZE NO ADMIN: HCPCS | Performed by: INTERNAL MEDICINE

## 2018-01-26 PROCEDURE — G8420 CALC BMI NORM PARAMETERS: HCPCS | Performed by: INTERNAL MEDICINE

## 2018-01-26 PROCEDURE — G8427 DOCREV CUR MEDS BY ELIG CLIN: HCPCS | Performed by: INTERNAL MEDICINE

## 2018-01-26 PROCEDURE — 4040F PNEUMOC VAC/ADMIN/RCVD: CPT | Performed by: INTERNAL MEDICINE

## 2018-01-26 ASSESSMENT — ENCOUNTER SYMPTOMS
EYE REDNESS: 0
CHEST TIGHTNESS: 0
ABDOMINAL PAIN: 0
BACK PAIN: 0
SHORTNESS OF BREATH: 0
NAUSEA: 0

## 2018-01-26 NOTE — PROGRESS NOTES
or Fever 100 tablet 3    levothyroxine (SYNTHROID) 100 MCG tablet Take 1 tablet by mouth daily 90 tablet 3    escitalopram (LEXAPRO) 5 MG tablet TAKE 1 TABLET BY MOUTH DAILY TO HELP ANXIETY 90 tablet 3    atorvastatin (LIPITOR) 40 MG tablet Take 1 tablet by mouth every morning 90 tablet 3    ALPRAZolam (XANAX) 0.25 MG tablet TAKE 1 TABLET BY MOUTH TWO TIMES DAILY AS NEEDED FOR SLEEP/ANXIETY 60 tablet 1    allopurinol (ZYLOPRIM) 100 MG tablet Take 1 tablet by mouth daily 90 tablet 3    Melatonin 5 MG CAPS TAKE 1 CAPSULE BY MOUTH NIGHTLY AS NEEDED FOR SLEEP 90 capsule 3    fluticasone (FLONASE) 50 MCG/ACT nasal spray USE 2SPRAYS BY NASAL ROUTE DAILY 3 Bottle 3    Cholecalciferol (VITAMIN D) 1000 UNIT TABS Take 1 tablet by mouth daily.  fish oil-omega-3 fatty acids 1000 MG capsule Take 2 g by mouth daily.  furosemide (LASIX) 20 MG tablet Take 3 tablets by mouth 2 times daily 60 tablet 0     No current facility-administered medications on file prior to visit. Review of Systems   Constitutional: Negative for fatigue, fever and unexpected weight change. HENT: Negative for hearing loss. Eyes: Negative for redness and visual disturbance. Respiratory: Negative for chest tightness and shortness of breath. He is coughing when he eats, discussed speech therapy eval.    Cardiovascular: Negative for chest pain and palpitations. Gastrointestinal: Negative for abdominal pain and nausea. Endocrine: Negative for cold intolerance, heat intolerance, polydipsia and polyuria. Genitourinary: Negative for dysuria and hematuria. Musculoskeletal: Negative for arthralgias, back pain and neck pain. Skin: Negative for rash and wound. Neurological: Negative for dizziness and headaches. Hematological: Negative for adenopathy. Does not bruise/bleed easily. Psychiatric/Behavioral: Negative for agitation. The patient is not nervous/anxious.         Objective:   Physical Exam   Constitutional: He

## 2018-02-09 ENCOUNTER — TELEPHONE (OUTPATIENT)
Dept: INTERNAL MEDICINE | Age: 83
End: 2018-02-09

## 2018-02-09 RX ORDER — METOPROLOL SUCCINATE 50 MG/1
TABLET, EXTENDED RELEASE ORAL
Qty: 30 TABLET | Refills: 10 | Status: SHIPPED | OUTPATIENT
Start: 2018-02-09 | End: 2018-09-05 | Stop reason: SDUPTHER

## 2018-02-09 RX ORDER — DILTIAZEM HYDROCHLORIDE 120 MG/1
CAPSULE, COATED, EXTENDED RELEASE ORAL
Qty: 30 CAPSULE | Refills: 10 | Status: SHIPPED | OUTPATIENT
Start: 2018-02-09 | End: 2019-01-01

## 2018-02-09 RX ORDER — FENOFIBRATE 54 MG/1
TABLET ORAL
Qty: 30 TABLET | Refills: 10 | Status: SHIPPED | OUTPATIENT
Start: 2018-02-09 | End: 2019-01-06 | Stop reason: SDUPTHER

## 2018-02-09 RX ORDER — FUROSEMIDE 40 MG/1
TABLET ORAL
Qty: 60 TABLET | Refills: 10 | Status: SHIPPED | OUTPATIENT
Start: 2018-02-09 | End: 2018-05-18 | Stop reason: SDUPTHER

## 2018-02-09 RX ORDER — FUROSEMIDE 20 MG/1
TABLET ORAL
Qty: 60 TABLET | Refills: 10 | Status: SHIPPED | OUTPATIENT
Start: 2018-02-09 | End: 2018-03-26 | Stop reason: SDUPTHER

## 2018-02-09 RX ORDER — ACETAMINOPHEN 160 MG
1 TABLET,DISINTEGRATING ORAL DAILY
Qty: 30 CAPSULE | Refills: 11 | Status: SHIPPED | OUTPATIENT
Start: 2018-02-09 | End: 2018-12-13 | Stop reason: SDUPTHER

## 2018-02-09 NOTE — TELEPHONE ENCOUNTER
Martina from [facility] stated that pt needs a Rx for Vitamin D   Sent to pharmacy on file verified.

## 2018-02-13 ENCOUNTER — CARE COORDINATION (OUTPATIENT)
Dept: CARE COORDINATION | Age: 83
End: 2018-02-13

## 2018-02-16 NOTE — PATIENT INSTRUCTIONS
good.  · Breathe moist air from a hot shower or from a sink filled with hot water to help clear a stuffy nose. · Before you use cough and cold medicines, check the label. These medicines may not be safe for young children or for people with certain health problems. · If the skin around your nose and lips becomes sore, put some petroleum jelly on the area. · To ease coughing:  ¨ Drink fluids to soothe a scratchy throat. ¨ Suck on cough drops or plain hard candy. ¨ Take an over-the-counter cough medicine that contains dextromethorphan to help you get some sleep. Read and follow all instructions on the label. ¨ Raise your head at night with an extra pillow. This may help you rest if coughing keeps you awake. · Take any prescribed medicine exactly as directed. Call your doctor if you think you are having a problem with your medicine. To avoid spreading the flu  · Wash your hands regularly, and keep your hands away from your face. · Stay home from school, work, and other public places until you are feeling better and your fever has been gone for at least 24 hours. The fever needs to have gone away on its own without the help of medicine. · Ask people living with you to talk to their doctors about preventing the flu. They may get antiviral medicine to keep from getting the flu from you. · To prevent the flu in the future, get a flu vaccine every fall. Encourage people living with you to get the vaccine. · Cover your mouth when you cough or sneeze. When should you call for help? Call 911 anytime you think you may need emergency care. For example, call if:  ? · You have severe trouble breathing. ?Call your doctor now or seek immediate medical care if:  ? · You have new or worse trouble breathing. ? · You seem to be getting much sicker. ? · You feel very sleepy or confused. ? · You have a new or higher fever. ? · You get a new rash. ? Watch closely for changes in your health, and be sure to contact

## 2018-03-15 ENCOUNTER — CARE COORDINATION (OUTPATIENT)
Dept: CARE COORDINATION | Age: 83
End: 2018-03-15

## 2018-03-19 RX ORDER — ALPRAZOLAM 0.25 MG/1
TABLET ORAL
Qty: 14 TABLET | Refills: 0 | OUTPATIENT
Start: 2018-03-19 | End: 2018-03-26

## 2018-03-19 NOTE — TELEPHONE ENCOUNTER
LOV 1/26/18  LRF 2/19/18  OARRS ran  Active MME/day: 0.00 30 Day Avg.  MME/day: 0.00    Will send to MD on call for short supply until PCP returns

## 2018-03-22 NOTE — CARE COORDINATION
Admission medications    Medication Sig Start Date End Date Taking?  Authorizing Provider   ALPRAZolam [place] Anastasia) 0.25 MG tablet TAKE 1 TABLET BY MOUTH TWICE DAILY AS NEEDED FOR SLEEP/ANXIETY 3/19/18 3/26/18  Evelyn Solares MD   Cholecalciferol (VITAMIN D3) 2000 units CAPS Take 1 capsule by mouth daily 2/9/18   Omar Martinez MD   metoprolol succinate (TOPROL XL) 50 MG extended release tablet TAKE 1 TAB BY MOUTH DAILY (DX: _________) 2/9/18   Arnoldo Schneider MD   furosemide (LASIX) 40 MG tablet TAKE 1 TABLET BY MOUTH TWICE A DAY (TAKE W/20MG=TD 60MG) (DX: _________) 2/9/18   Arnoldo Schneider MD   furosemide (LASIX) 20 MG tablet TAKE 1 TABLET BY MOUTH TWICE A DAY (TAKE W/40MG=TD 60MG) (DX: _________) 2/9/18   Arnoldo Schneider MD   fenofibrate (TRICOR) 54 MG tablet TAKE 1 TABLET BY MOUTH DAILY (DX: _________) 2/9/18   Arnoldo Schneider MD   diltiazem (CARDIZEM CD) 120 MG extended release capsule TAKE 1 CAP BY MOUTH DAILY (DX: _________) 2/9/18   Arnoldo Schneider MD   famotidine (PEPCID) 20 MG tablet TAKE 1 TABLET BY MOUTH DAILY AS NEEDED FOR REFLUX 12/12/17   Arnoldo Schneider MD   aspirin 81 MG EC tablet Take 1 tablet by mouth daily 11/8/17   Arnoldo Schneider MD   Multiple Vitamins-Minerals (CENTRUM SILVER) TABS Take 1 tablet by mouth daily 11/8/17   Arnoldo Schneider MD   clopidogrel (PLAVIX) 75 MG tablet Take 1 tablet by mouth daily 11/8/17   Arnoldo Schneider MD   Acetaminophen (APAP) 325 MG TABS Take 650 mg by mouth every 6 hours as needed for Pain or Fever 11/8/17   Arnoldo Schneider MD   levothyroxine (SYNTHROID) 100 MCG tablet Take 1 tablet by mouth daily 11/3/17   Omar MD Michelle   escitalopram (LEXAPRO) 5 MG tablet TAKE 1 TABLET BY MOUTH DAILY TO HELP ANXIETY 11/3/17   Arnoldo Schneider MD   atorvastatin (LIPITOR) 40 MG tablet Take 1 tablet by mouth every morning 11/3/17   Arnoldo Schneider MD   allopurinol (ZYLOPRIM) 100 MG tablet Take 1 tablet by mouth daily 11/3/17   Arnoldo Schneider MD   Melatonin 5 MG CAPS TAKE 1 CAPSULE BY MOUTH NIGHTLY AS NEEDED FOR SLEEP 11/3/17   Arnoldo Schneider MD fluticasone (FLONASE) 50 MCG/ACT nasal spray USE 2SPRAYS BY NASAL ROUTE DAILY 11/3/17   Griselda Carrion MD   fish oil-omega-3 fatty acids 1000 MG capsule Take 2 g by mouth daily.     Historical Provider, MD       Future Appointments  Date Time Provider Gwen Perez   4/24/2018 10:00 AM Di Pineda [facility] PHONE TRANSMISSION [place] [facility]   5/1/2018 10:40 AM Griselda Carrion MD [facility] [address] IM [facility]   5/17/2018 1:30 PM Aminta Pemberton MD [facility]     ,   Congestive Heart Failure Assessment    Are you currently restricting fluids?:  No Restriction  Do you understand a low sodium diet?:  Yes  Do you understand how to read food labels?:  Yes  How many restaurant meals do you eat per week?:  0  Do you salt your food before tasting it?:  No     No patient-reported symptoms      Symptoms:   None:  Yes      Symptom course:  stable  Weight trend:  stable  Salt intake watch compared to last visit:  stable      and   General Assessment    Do you have any symptoms that are causing concern?:  No

## 2018-04-21 ENCOUNTER — CARE COORDINATION (OUTPATIENT)
Dept: CARE COORDINATION | Age: 83
End: 2018-04-21

## 2018-04-24 ENCOUNTER — NURSE ONLY (OUTPATIENT)
Dept: CARDIOLOGY CLINIC | Age: 83
End: 2018-04-24

## 2018-04-24 ENCOUNTER — TELEPHONE (OUTPATIENT)
Dept: CARDIOLOGY CLINIC | Age: 83
End: 2018-04-24

## 2018-04-24 DIAGNOSIS — I50.23 ACUTE ON CHRONIC SYSTOLIC HEART FAILURE (HCC): ICD-10-CM

## 2018-04-24 DIAGNOSIS — Z95.810 AUTOMATIC IMPLANTABLE CARDIOVERTER-DEFIBRILLATOR IN SITU: ICD-10-CM

## 2018-04-24 PROCEDURE — 93297 REM INTERROG DEV EVAL ICPMS: CPT | Performed by: INTERNAL MEDICINE

## 2018-04-24 PROCEDURE — 93296 REM INTERROG EVL PM/IDS: CPT | Performed by: INTERNAL MEDICINE

## 2018-04-24 PROCEDURE — 93295 DEV INTERROG REMOTE 1/2/MLT: CPT | Performed by: INTERNAL MEDICINE

## 2018-04-30 ENCOUNTER — TELEPHONE (OUTPATIENT)
Dept: CARDIOLOGY CLINIC | Age: 83
End: 2018-04-30

## 2018-04-30 DIAGNOSIS — N18.30 STAGE 3 CHRONIC KIDNEY DISEASE (HCC): ICD-10-CM

## 2018-04-30 DIAGNOSIS — I50.22 CHRONIC SYSTOLIC CONGESTIVE HEART FAILURE (HCC): Primary | ICD-10-CM

## 2018-05-01 ENCOUNTER — CARE COORDINATION (OUTPATIENT)
Dept: CARE COORDINATION | Age: 83
End: 2018-05-01

## 2018-05-01 ENCOUNTER — OFFICE VISIT (OUTPATIENT)
Dept: INTERNAL MEDICINE | Age: 83
End: 2018-05-01

## 2018-05-01 VITALS
DIASTOLIC BLOOD PRESSURE: 60 MMHG | HEIGHT: 68 IN | BODY MASS INDEX: 20 KG/M2 | HEART RATE: 57 BPM | RESPIRATION RATE: 12 BRPM | SYSTOLIC BLOOD PRESSURE: 120 MMHG | WEIGHT: 132 LBS | OXYGEN SATURATION: 98 %

## 2018-05-01 DIAGNOSIS — I10 ESSENTIAL HYPERTENSION: Primary | ICD-10-CM

## 2018-05-01 DIAGNOSIS — F41.9 ANXIETY: ICD-10-CM

## 2018-05-01 DIAGNOSIS — Z23 NEED FOR PROPHYLACTIC VACCINATION AGAINST DIPHTHERIA-TETANUS-PERTUSSIS (DTP): ICD-10-CM

## 2018-05-01 DIAGNOSIS — Z23 NEED FOR PROPHYLACTIC VACCINATION AND INOCULATION AGAINST VARICELLA: ICD-10-CM

## 2018-05-01 PROCEDURE — 99213 OFFICE O/P EST LOW 20 MIN: CPT | Performed by: INTERNAL MEDICINE

## 2018-05-01 PROCEDURE — 1036F TOBACCO NON-USER: CPT | Performed by: INTERNAL MEDICINE

## 2018-05-01 PROCEDURE — G8598 ASA/ANTIPLAT THER USED: HCPCS | Performed by: INTERNAL MEDICINE

## 2018-05-01 PROCEDURE — 1123F ACP DISCUSS/DSCN MKR DOCD: CPT | Performed by: INTERNAL MEDICINE

## 2018-05-01 PROCEDURE — G8427 DOCREV CUR MEDS BY ELIG CLIN: HCPCS | Performed by: INTERNAL MEDICINE

## 2018-05-01 PROCEDURE — G8420 CALC BMI NORM PARAMETERS: HCPCS | Performed by: INTERNAL MEDICINE

## 2018-05-01 PROCEDURE — 4040F PNEUMOC VAC/ADMIN/RCVD: CPT | Performed by: INTERNAL MEDICINE

## 2018-05-01 RX ORDER — FUROSEMIDE 20 MG/1
60 TABLET ORAL 2 TIMES DAILY
Qty: 180 TABLET | Refills: 11 | Status: SHIPPED | OUTPATIENT
Start: 2018-05-01 | End: 2018-05-17 | Stop reason: SDUPTHER

## 2018-05-01 ASSESSMENT — ENCOUNTER SYMPTOMS
BACK PAIN: 0
NAUSEA: 0
EYE REDNESS: 0
CHEST TIGHTNESS: 0
SHORTNESS OF BREATH: 0
ABDOMINAL PAIN: 0

## 2018-05-01 ASSESSMENT — PATIENT HEALTH QUESTIONNAIRE - PHQ9
SUM OF ALL RESPONSES TO PHQ9 QUESTIONS 1 & 2: 0
SUM OF ALL RESPONSES TO PHQ QUESTIONS 1-9: 0
1. LITTLE INTEREST OR PLEASURE IN DOING THINGS: 0
2. FEELING DOWN, DEPRESSED OR HOPELESS: 0

## 2018-06-05 ENCOUNTER — OFFICE VISIT (OUTPATIENT)
Dept: CARDIOLOGY CLINIC | Age: 83
End: 2018-06-05

## 2018-06-05 ENCOUNTER — PROCEDURE VISIT (OUTPATIENT)
Dept: CARDIOLOGY CLINIC | Age: 83
End: 2018-06-05

## 2018-06-05 VITALS
HEART RATE: 61 BPM | BODY MASS INDEX: 20.13 KG/M2 | WEIGHT: 132.4 LBS | DIASTOLIC BLOOD PRESSURE: 58 MMHG | SYSTOLIC BLOOD PRESSURE: 110 MMHG

## 2018-06-05 DIAGNOSIS — Z95.810 AUTOMATIC IMPLANTABLE CARDIOVERTER-DEFIBRILLATOR IN SITU: ICD-10-CM

## 2018-06-05 DIAGNOSIS — I47.29 PAROXYSMAL VENTRICULAR TACHYCARDIA: ICD-10-CM

## 2018-06-05 DIAGNOSIS — I50.42 CHRONIC COMBINED SYSTOLIC AND DIASTOLIC CONGESTIVE HEART FAILURE (HCC): ICD-10-CM

## 2018-06-05 DIAGNOSIS — Z95.810 ICD (IMPLANTABLE CARDIOVERTER-DEFIBRILLATOR), DUAL, IN SITU: Primary | ICD-10-CM

## 2018-06-05 DIAGNOSIS — I47.1 ATRIAL TACHYCARDIA (HCC): ICD-10-CM

## 2018-06-05 DIAGNOSIS — I25.10 CORONARY ARTERY DISEASE INVOLVING NATIVE HEART WITHOUT ANGINA PECTORIS, UNSPECIFIED VESSEL OR LESION TYPE: ICD-10-CM

## 2018-06-05 DIAGNOSIS — I48.0 PAROXYSMAL ATRIAL FIBRILLATION (HCC): ICD-10-CM

## 2018-06-05 PROCEDURE — G8427 DOCREV CUR MEDS BY ELIG CLIN: HCPCS | Performed by: INTERNAL MEDICINE

## 2018-06-05 PROCEDURE — 93283 PRGRMG EVAL IMPLANTABLE DFB: CPT | Performed by: INTERNAL MEDICINE

## 2018-06-05 PROCEDURE — G8598 ASA/ANTIPLAT THER USED: HCPCS | Performed by: INTERNAL MEDICINE

## 2018-06-05 PROCEDURE — 1036F TOBACCO NON-USER: CPT | Performed by: INTERNAL MEDICINE

## 2018-06-05 PROCEDURE — 1123F ACP DISCUSS/DSCN MKR DOCD: CPT | Performed by: INTERNAL MEDICINE

## 2018-06-05 PROCEDURE — 99214 OFFICE O/P EST MOD 30 MIN: CPT | Performed by: INTERNAL MEDICINE

## 2018-06-05 PROCEDURE — 4040F PNEUMOC VAC/ADMIN/RCVD: CPT | Performed by: INTERNAL MEDICINE

## 2018-06-05 PROCEDURE — G8420 CALC BMI NORM PARAMETERS: HCPCS | Performed by: INTERNAL MEDICINE

## 2018-06-11 RX ORDER — FAMOTIDINE 20 MG/1
TABLET, FILM COATED ORAL
Qty: 30 TABLET | Refills: 3 | Status: SHIPPED | OUTPATIENT
Start: 2018-06-11 | End: 2018-10-01 | Stop reason: SDUPTHER

## 2018-07-03 ENCOUNTER — CARE COORDINATION (OUTPATIENT)
Dept: CARE COORDINATION | Age: 83
End: 2018-07-03

## 2018-07-04 NOTE — CARE COORDINATION
5/17/18   Alexander Coker MD   Cholecalciferol (VITAMIN D3) 2000 units CAPS Take 1 capsule by mouth daily 2/9/18   Omar Martinez MD   metoprolol succinate (TOPROL XL) 50 MG extended release tablet TAKE 1 TAB BY MOUTH DAILY (DX: _________) 2/9/18   Alan Canales MD   fenofibrate (TRICOR) 54 MG tablet TAKE 1 TABLET BY MOUTH DAILY (DX: _________) 2/9/18   Alan Canales MD   diltiazem (CARDIZEM CD) 120 MG extended release capsule TAKE 1 CAP BY MOUTH DAILY (DX: _________) 2/9/18   Alan Canales MD   famotidine (PEPCID) 20 MG tablet TAKE 1 TABLET BY MOUTH DAILY AS NEEDED FOR REFLUX 12/12/17   Alan Canales MD   aspirin 81 MG EC tablet Take 1 tablet by mouth daily 11/8/17   Alan Canales MD   Multiple Vitamins-Minerals (CENTRUM SILVER) TABS Take 1 tablet by mouth daily 11/8/17   Alan Canales MD   clopidogrel (PLAVIX) 75 MG tablet Take 1 tablet by mouth daily 11/8/17   Alan Canales MD   Acetaminophen (APAP) 325 MG TABS Take 650 mg by mouth every 6 hours as needed for Pain or Fever 11/8/17   Alan Canales MD   levothyroxine (SYNTHROID) 100 MCG tablet Take 1 tablet by mouth daily 11/3/17   Omar Martinez MD   escitalopram (LEXAPRO) 5 MG tablet TAKE 1 TABLET BY MOUTH DAILY TO HELP ANXIETY 11/3/17   Alan Canales MD   atorvastatin (LIPITOR) 40 MG tablet Take 1 tablet by mouth every morning 11/3/17   Alan Canales MD   allopurinol (ZYLOPRIM) 100 MG tablet Take 1 tablet by mouth daily 11/3/17   Alan Canales MD   Melatonin 5 MG CAPS TAKE 1 CAPSULE BY MOUTH NIGHTLY AS NEEDED FOR SLEEP 11/3/17   Alan Canales MD   fluticasone [facility]) 50 MCG/ACT nasal spray USE 2SPRAYS BY NASAL ROUTE DAILY 11/3/17   Omar Martinez MD   fish oil-omega-3 fatty acids 1000 MG capsule Take 2 g by mouth daily.     Historical Provider, MD       Future Appointments  Date Time Provider Gwen Perez   8/1/2018 11:20 AM Alan Canales MD K[facility] 111 IM [facility]   9/5/2018 9:30 AM Taylor [place] [facility]   9/5/2018 10:00 AM CHANO [place] Card [facility]   10/30/2018 1:00 PM Jamie Sommers MD [facility]   12/4/2018 9:30 AM SCHEDULE, NANCY PHONE TRANSMISSION [place]     ,   Congestive Heart Failure Assessment    Are you currently restricting fluids?:  No Restriction  Do you understand a low sodium diet?:  Yes  Do you understand how to read food labels?:  Yes  How many restaurant meals do you eat per week?:  0  Do you salt your food before tasting it?:  No     No patient-reported symptoms      Symptoms:   None:  Yes      Symptom course:  stable  Weight trend:  stable  Salt intake watch compared to last visit:  stable      and   General Assessment    Do you have any symptoms that are causing concern?:  No

## 2018-08-14 ENCOUNTER — CARE COORDINATION (OUTPATIENT)
Dept: CARE COORDINATION | Age: 83
End: 2018-08-14

## 2018-08-14 ENCOUNTER — OFFICE VISIT (OUTPATIENT)
Dept: INTERNAL MEDICINE | Age: 83
End: 2018-08-14

## 2018-08-14 VITALS
BODY MASS INDEX: 20.4 KG/M2 | RESPIRATION RATE: 12 BRPM | SYSTOLIC BLOOD PRESSURE: 136 MMHG | WEIGHT: 130 LBS | DIASTOLIC BLOOD PRESSURE: 60 MMHG | HEART RATE: 60 BPM | OXYGEN SATURATION: 98 % | HEIGHT: 67 IN

## 2018-08-14 DIAGNOSIS — F41.9 ANXIETY: ICD-10-CM

## 2018-08-14 DIAGNOSIS — I10 ESSENTIAL HYPERTENSION: Primary | ICD-10-CM

## 2018-08-14 PROCEDURE — 99213 OFFICE O/P EST LOW 20 MIN: CPT | Performed by: INTERNAL MEDICINE

## 2018-08-14 PROCEDURE — 4040F PNEUMOC VAC/ADMIN/RCVD: CPT | Performed by: INTERNAL MEDICINE

## 2018-08-14 PROCEDURE — 1101F PT FALLS ASSESS-DOCD LE1/YR: CPT | Performed by: INTERNAL MEDICINE

## 2018-08-14 PROCEDURE — 1036F TOBACCO NON-USER: CPT | Performed by: INTERNAL MEDICINE

## 2018-08-14 PROCEDURE — G8427 DOCREV CUR MEDS BY ELIG CLIN: HCPCS | Performed by: INTERNAL MEDICINE

## 2018-08-14 PROCEDURE — G8420 CALC BMI NORM PARAMETERS: HCPCS | Performed by: INTERNAL MEDICINE

## 2018-08-14 PROCEDURE — G8598 ASA/ANTIPLAT THER USED: HCPCS | Performed by: INTERNAL MEDICINE

## 2018-08-14 PROCEDURE — 1123F ACP DISCUSS/DSCN MKR DOCD: CPT | Performed by: INTERNAL MEDICINE

## 2018-08-14 ASSESSMENT — ENCOUNTER SYMPTOMS
BACK PAIN: 0
SHORTNESS OF BREATH: 0
ABDOMINAL PAIN: 0
NAUSEA: 0
CHEST TIGHTNESS: 0
EYE REDNESS: 0

## 2018-08-14 NOTE — PROGRESS NOTES
Subjective:      Patient ID: Kimber Bowman is a 80 y.o. male    Chief Complaint   Patient presents with    Anxiety       Hypertension   This is a chronic problem. The current episode started more than 1 year ago. The problem is controlled. Pertinent negatives include no chest pain, headaches, neck pain, palpitations, peripheral edema or shortness of breath. There are no associated agents to hypertension. Past treatments include diuretics, beta blockers and calcium channel blockers. The current treatment provides significant improvement. Compliance problems include exercise. Anxiety. He does have some chronic anxiety. This is better controlled than in the past. He does occasionally use alprazolam for control of more severe anxiety symptoms. He is generally a happy person and looks on the bright side of things. He is always encouraging to his wife. He has his own set of chronic illness issues which are under good control.     Current Outpatient Prescriptions on File Prior to Visit   Medication Sig Dispense Refill    famotidine (PEPCID) 20 MG tablet TAKE 1 TABLET BY MOUTH DAILY AS NEEDED FOR REFLUX 30 tablet 3    furosemide (LASIX) 40 MG tablet Take 1 tablet by mouth 2 times daily 60 tablet 10    furosemide (LASIX) 20 MG tablet Take 2 tablets by mouth 2 times daily 180 tablet 11    Cholecalciferol (VITAMIN D3) 2000 units CAPS Take 1 capsule by mouth daily 30 capsule 11    metoprolol succinate (TOPROL XL) 50 MG extended release tablet TAKE 1 TAB BY MOUTH DAILY (DX: _________) 30 tablet 10    fenofibrate (TRICOR) 54 MG tablet TAKE 1 TABLET BY MOUTH DAILY (DX: _________) 30 tablet 10    diltiazem (CARDIZEM CD) 120 MG extended release capsule TAKE 1 CAP BY MOUTH DAILY (DX: _________) 30 capsule 10    famotidine (PEPCID) 20 MG tablet TAKE 1 TABLET BY MOUTH DAILY AS NEEDED FOR REFLUX 90 tablet 3    aspirin 81 MG EC tablet Take 1 tablet by mouth daily 90 tablet 3    Multiple Vitamins-Minerals (CENTRUM SILVER) TABS Take 1 tablet by mouth daily 90 tablet 3    clopidogrel (PLAVIX) 75 MG tablet Take 1 tablet by mouth daily 90 tablet 3    Acetaminophen (APAP) 325 MG TABS Take 650 mg by mouth every 6 hours as needed for Pain or Fever 100 tablet 3    levothyroxine (SYNTHROID) 100 MCG tablet Take 1 tablet by mouth daily 90 tablet 3    escitalopram (LEXAPRO) 5 MG tablet TAKE 1 TABLET BY MOUTH DAILY TO HELP ANXIETY 90 tablet 3    atorvastatin (LIPITOR) 40 MG tablet Take 1 tablet by mouth every morning 90 tablet 3    allopurinol (ZYLOPRIM) 100 MG tablet Take 1 tablet by mouth daily 90 tablet 3    Melatonin 5 MG CAPS TAKE 1 CAPSULE BY MOUTH NIGHTLY AS NEEDED FOR SLEEP 90 capsule 3    fluticasone (FLONASE) 50 MCG/ACT nasal spray USE 2SPRAYS BY NASAL ROUTE DAILY 3 Bottle 3    fish oil-omega-3 fatty acids 1000 MG capsule Take 2 g by mouth daily. No current facility-administered medications on file prior to visit. No Known Allergies    Review of Systems   Constitutional: Negative for fatigue, fever and unexpected weight change. HENT: Negative for hearing loss. Eyes: Negative for redness and visual disturbance. Respiratory: Negative for chest tightness and shortness of breath. Cardiovascular: Negative for chest pain and palpitations. Gastrointestinal: Negative for abdominal pain and nausea. Endocrine: Negative for polydipsia and polyuria. Genitourinary: Negative for dysuria and hematuria. Musculoskeletal: Negative for arthralgias, back pain and neck pain. Skin: Negative for rash and wound. Neurological: Negative for dizziness and headaches. Hematological: Negative for adenopathy. Does not bruise/bleed easily. Psychiatric/Behavioral: Negative for agitation. The patient is not nervous/anxious. Objective:   Physical Exam   Constitutional: He is oriented to person, place, and time. He appears well-developed and well-nourished.    Cardiovascular: Normal rate, regular rhythm and normal

## 2018-08-15 NOTE — CARE COORDINATION
care.  I will notify my provider of any symptoms that indicate a worsening of my condition. Barriers: lack of education  Plan for overcoming my barriers: Provide pt education and resources. Confidence: 6/10  Anticipated Goal Completion Date: 10/24/17              Prior to Admission medications    Medication Sig Start Date End Date Taking?  Authorizing Provider   ALPRAZolam [facility]) 0.25 MG tablet TAKE 1 TABLET BY MOUTH TWICE DAILY AS NEEDED FOR ANXIETY OR SLEEP 8/14/18 9/13/18  Claudeen Levan, MD   famotidine (PEPCID) 20 MG tablet TAKE 1 TABLET BY MOUTH DAILY AS NEEDED FOR REFLUX 6/11/18 7/11/18  Claudeen Levan, MD   furosemide (LASIX) 40 MG tablet Take 1 tablet by mouth 2 times daily 5/18/18   Aleida Hazel MD   furosemide (LASIX) 20 MG tablet Take 2 tablets by mouth 2 times daily 5/17/18   Aleida Hazel MD   Cholecalciferol (VITAMIN D3) 2000 units CAPS Take 1 capsule by mouth daily 2/9/18   Omar Martinez MD   metoprolol succinate (TOPROL XL) 50 MG extended release tablet TAKE 1 TAB BY MOUTH DAILY (DX: _________) 2/9/18   Claudeen Levan, MD   fenofibrate (TRICOR) 54 MG tablet TAKE 1 TABLET BY MOUTH DAILY (DX: _________) 2/9/18   Claudeen Levan, MD   diltiazem (CARDIZEM CD) 120 MG extended release capsule TAKE 1 CAP BY MOUTH DAILY (DX: _________) 2/9/18   Claudeen Levan, MD   famotidine (PEPCID) 20 MG tablet TAKE 1 TABLET BY MOUTH DAILY AS NEEDED FOR REFLUX 12/12/17   Claudeen Levan, MD   aspirin 81 MG EC tablet Take 1 tablet by mouth daily 11/8/17   Claudeen Levan, MD   Multiple Vitamins-Minerals (CENTRUM SILVER) TABS Take 1 tablet by mouth daily 11/8/17   Claudeen Levan, MD   clopidogrel (PLAVIX) 75 MG tablet Take 1 tablet by mouth daily 11/8/17   Claudeen Levan, MD   Acetaminophen (APAP) 325 MG TABS Take 650 mg by mouth every 6 hours as needed for Pain or Fever 11/8/17   Claudeen Levan, MD   levothyroxine (SYNTHROID) 100 MCG tablet Take 1 tablet by mouth daily 11/3/17   Omar Martinez MD   escitalopram (LEXAPRO) 5 MG tablet TAKE 1 TABLET BY MOUTH DAILY TO HELP ANXIETY 11/3/17   Nan Subramanian MD   atorvastatin (LIPITOR) 40 MG tablet Take 1 tablet by mouth every morning 11/3/17   Nan Subramanian MD   allopurinol (ZYLOPRIM) 100 MG tablet Take 1 tablet by mouth daily 11/3/17   Nan Subramanian MD   Melatonin 5 MG CAPS TAKE 1 CAPSULE BY MOUTH NIGHTLY AS NEEDED FOR SLEEP 11/3/17   Nan Subramanian MD   fluticasone [facility]) 50 MCG/ACT nasal spray USE 2SPRAYS BY NASAL ROUTE DAILY 11/3/17   Nan Subramanian MD   fish oil-omega-3 fatty acids 1000 MG capsule Take 2 g by mouth daily.     Historical Provider, MD       Future Appointments  Date Time Provider Gwen Perez   9/5/2018 9:30 AM Cierra GRUBER [facility]   9/5/2018 10:00 AM CHANO Zelaya - [facility]   10/30/2018 1:00 PM Jamie Sommers MD [facility]   11/13/2018 11:00 AM Nan Subramanian MD [facility] IM [facility]   12/4/2018 9:30 AM SCHEDULE, [facility] PHONE TRANSMISSION [facility]     ,   Congestive Heart Failure Assessment    Are you currently restricting fluids?:  No Restriction  Do you understand a low sodium diet?:  Yes  Do you understand how to read food labels?:  Yes  How many restaurant meals do you eat per week?:  0  Do you salt your food before tasting it?:  No     No patient-reported symptoms      Symptoms:   None:  Yes      Symptom course:  stable  Weight trend:  stable  Salt intake watch compared to last visit:  stable      and   General Assessment    Do you have any symptoms that are causing concern?:  No

## 2018-08-22 ENCOUNTER — HOSPITAL ENCOUNTER (EMERGENCY)
Age: 83
Discharge: HOME | End: 2018-08-22
Attending: EMERGENCY MEDICINE
Payer: MEDICARE

## 2018-08-22 VITALS
DIASTOLIC BLOOD PRESSURE: 43 MMHG | SYSTOLIC BLOOD PRESSURE: 176 MMHG | OXYGEN SATURATION: 100 % | HEART RATE: 59 BPM | TEMPERATURE: 98.4 F | RESPIRATION RATE: 16 BRPM

## 2018-08-22 DIAGNOSIS — S41.111A LACERATION OF RIGHT UPPER EXTREMITY, INITIAL ENCOUNTER: Primary | ICD-10-CM

## 2018-08-22 PROCEDURE — 99282 EMERGENCY DEPT VISIT SF MDM: CPT

## 2018-08-22 PROCEDURE — 96372 THER/PROPH/DIAG INJ SC/IM: CPT

## 2018-08-22 PROCEDURE — 2500000003 HC RX 250 WO HCPCS: Performed by: PHYSICIAN ASSISTANT

## 2018-08-22 RX ORDER — LIDOCAINE HYDROCHLORIDE AND EPINEPHRINE 10; 10 MG/ML; UG/ML
5 INJECTION, SOLUTION INFILTRATION; PERINEURAL ONCE
Status: COMPLETED | OUTPATIENT
Start: 2018-08-22 | End: 2018-08-22

## 2018-08-22 RX ADMIN — LIDOCAINE HYDROCHLORIDE,EPINEPHRINE BITARTRATE 5 ML: 10; .01 INJECTION, SOLUTION INFILTRATION; PERINEURAL at 12:33

## 2018-08-22 ASSESSMENT — PAIN SCALES - GENERAL: PAINLEVEL_OUTOF10: 0

## 2018-08-22 NOTE — ED PROVIDER NOTES
[facility]          PHYSICIAN ASSISTANT NOTE       Date of evaluation: 8/22/2018    Chief Complaint     Laceration (Right forearm lac. Pt drove himself from nursing home)      History of Present Illness     Raven Aguilar is a 80 y.o. male who presents with right arm pain. Patient states that he hit his right arm yesterday evening on the chair. Patient states that he is on Plavix, so he bleeds easily. Patient states his arm has been consistently bleeding since yesterday. Patient is in a nursing home and the nurses there tried to control the bleeding with pressure and cause, but stated that they wanted him to be seen. Patient drove himself to the ED. Patient denies any pain at this time, but states that he just cannot control this bleeding. Denies hitting head or loss of consciousness. He denies any other injury or pain at this time. Review of Systems     Review of Systems    Past Medical, Surgical, Family, and Social History     He has a past medical history of Atrial fibrillation ([address] CAD (coronary artery disease); Chronic renal insufficiency; Gout; Hyperlipidemia; Hypertension; and Hypothyroidism. He has a past surgical history that includes Pacemaker insertion (2008); Coronary artery bypass graft (2001); shoulder surgery; Colon surgery (2004); and Cardiac defibrillator placement. His family history includes Dementia in his mother; Emphysema in his father; Other in his brother and father. He reports that he has quit smoking. He has never used smokeless tobacco. He reports that he drinks about 3.6 oz of alcohol per week . He reports that he does not use drugs.     Medications     Discharge Medication List as of 8/22/2018  1:23 PM      CONTINUE these medications which have NOT CHANGED    Details   ALPRAZolam (XANAX) 0.25 MG tablet TAKE 1 TABLET BY MOUTH TWICE DAILY AS NEEDED FOR ANXIETY OR SLEEP, Disp-60 tablet, R-0Phone In      !! furosemide (LASIX) 40 MG tablet Take 1 tablet by mouth 2 times daily, Disp-60 tablet, R-10Normal      !! furosemide (LASIX) 20 MG tablet Take 2 tablets by mouth 2 times daily, Disp-180 tablet, R-11Normal      Cholecalciferol (VITAMIN D3) 2000 units CAPS Take 1 capsule by mouth daily, Disp-30 capsule, R-11Normal      metoprolol succinate (TOPROL XL) 50 MG extended release tablet TAKE 1 TAB BY MOUTH DAILY (DX: _________), Disp-30 tablet, R-10Normal      fenofibrate (TRICOR) 54 MG tablet TAKE 1 TABLET BY MOUTH DAILY (DX: _________), Disp-30 tablet, R-10Normal      diltiazem (CARDIZEM CD) 120 MG extended release capsule TAKE 1 CAP BY MOUTH DAILY (DX: _________), Disp-30 capsule, R-10Normal      famotidine (PEPCID) 20 MG tablet TAKE 1 TABLET BY MOUTH DAILY AS NEEDED FOR REFLUX, Disp-90 tablet, R-3**Patient requests 90 days supply**Normal      aspirin 81 MG EC tablet Take 1 tablet by mouth daily, Disp-90 tablet, R-3Normal      Multiple Vitamins-Minerals (CENTRUM SILVER) TABS Take 1 tablet by mouth daily, Disp-90 tablet, R-3Normal      clopidogrel (PLAVIX) 75 MG tablet Take 1 tablet by mouth daily, Disp-90 tablet, R-3Normal      Acetaminophen (APAP) 325 MG TABS Take 650 mg by mouth every 6 hours as needed for Pain or Fever, Disp-100 tablet, R-3Normal      levothyroxine (SYNTHROID) 100 MCG tablet Take 1 tablet by mouth daily, Disp-90 tablet, R-3Normal      escitalopram (LEXAPRO) 5 MG tablet TAKE 1 TABLET BY MOUTH DAILY TO HELP ANXIETY, Disp-90 tablet, R-3**Patient requests 90 days supply**Normal      atorvastatin (LIPITOR) 40 MG tablet Take 1 tablet by mouth every morning, Disp-90 tablet, R-3Normal      allopurinol (ZYLOPRIM) 100 MG tablet Take 1 tablet by mouth daily, Disp-90 tablet, R-3Normal      Melatonin 5 MG CAPS TAKE 1 CAPSULE BY MOUTH NIGHTLY AS NEEDED FOR SLEEP, Disp-90 capsule, R-3Normal      fluticasone (FLONASE) 50 MCG/ACT nasal spray USE 2SPRAYS BY NASAL ROUTE DAILY, Disp-3 Bottle, R-3**Patient requests 90 days supply**Normal      fish oil-omega-3 fatty acids 1000 MG capsule Take 2 g by mouth daily. !! - Potential duplicate medications found. Please discuss with provider. Allergies     He has No Known Allergies. Physical Exam     INITIAL VITALS: BP: (!) 176/43, Temp: 98.4 °F (36.9 °C), Pulse: 59, Resp: 16, SpO2: 100 %   Physical Exam   Constitutional: He is oriented to person, place, and time. He appears well-developed and well-nourished. No distress. HENT:   Head: Normocephalic and atraumatic. Eyes: Pupils are equal, round, and reactive to light. Conjunctivae are normal.   Neck: Normal range of motion. Cardiovascular: Normal rate and intact distal pulses. Pulmonary/Chest: Effort normal and breath sounds normal. No respiratory distress. He has no wheezes. Abdominal: Soft. He exhibits no distension. There is no tenderness. Musculoskeletal: Normal range of motion. He exhibits no edema. Neurological: He is alert and oriented to person, place, and time. Skin: Skin is warm. No rash noted. He is not diaphoretic. No erythema. 1cm skin tear to dorsal aspect of right forearm. Actively bleeding. Multiple bruises to b/l forearms       Diagnostic Results     RADIOLOGY:  No orders to display       LABS:   No results found for this visit on 08/22/18. RECENT VITALS:  BP: (!) 176/43, Temp: 98.4 °F (36.9 °C), Pulse: 59, Resp: 16, SpO2: 100 %     Procedures       ED Course     Nursing Notes, Past Medical Hx, Past Surgical Hx, Social Hx, Allergies, and Family Hx were reviewed. The patient was given the following medications:  Orders Placed This Encounter   Medications    lidocaine-EPINEPHrine 1 percent-1:[id number] injection 5 mL       CONSULTS:  None    MEDICAL DECISION MAKING / ASSESSMENT / Beverly Dylan is a 80 y.o. male with skin tear to right forearm. 2mL lidocaine with epi were injected into the area. Pressure was held to the bleeding area for 5 minutes. Bleeding was under control and steri-strips were applied. Bandage was then applied over the Steri-Strips. Patient was given strict return precautions and instructions for care of wound with Steri-Strips. No further imaging or lab work was necessary at this time. This patient was also evaluated by the attending physician. All care plans were discussed and agreed upon. Clinical Impression     1. Laceration of right upper extremity, initial encounter        Disposition     PATIENT REFERRED TO:  No follow-up provider specified.     DISCHARGE MEDICATIONS:  Discharge Medication List as of 8/22/2018  1:23 PM          DISPOSITION Decision To Discharge 08/22/2018 01:08:01 PM       Ari Bonilla PA-C  [facility]22/18 [id number] [facility]  08/23/18 1010

## 2018-08-23 ENCOUNTER — CARE COORDINATION (OUTPATIENT)
Dept: CARE COORDINATION | Age: 83
End: 2018-08-23

## 2018-08-23 NOTE — CARE COORDINATION
Albar[facility] PHONE TRANSMISSION [facility]           New Medications?:   No      Medication Reconciliation by phone - Unchanged  Understands Medications? Yes  Taking Medications? Yes  Can you swallow your pills? Yes    Any further needs in the home i.e. Equipment?   No    Link to services in community?:  Yes   Which services:  [facility]

## 2018-09-05 ENCOUNTER — PROCEDURE VISIT (OUTPATIENT)
Dept: CARDIOLOGY CLINIC | Age: 83
End: 2018-09-05

## 2018-09-05 ENCOUNTER — OFFICE VISIT (OUTPATIENT)
Dept: CARDIOLOGY CLINIC | Age: 83
End: 2018-09-05

## 2018-09-05 VITALS
HEART RATE: 64 BPM | SYSTOLIC BLOOD PRESSURE: 138 MMHG | DIASTOLIC BLOOD PRESSURE: 50 MMHG | BODY MASS INDEX: 20.61 KG/M2 | WEIGHT: 131.6 LBS

## 2018-09-05 DIAGNOSIS — I25.10 CORONARY ARTERY DISEASE INVOLVING NATIVE CORONARY ARTERY OF NATIVE HEART WITHOUT ANGINA PECTORIS: ICD-10-CM

## 2018-09-05 DIAGNOSIS — Z95.810 ICD (IMPLANTABLE CARDIOVERTER-DEFIBRILLATOR), DUAL, IN SITU: ICD-10-CM

## 2018-09-05 DIAGNOSIS — Z95.810 AUTOMATIC IMPLANTABLE CARDIOVERTER-DEFIBRILLATOR IN SITU: ICD-10-CM

## 2018-09-05 DIAGNOSIS — I48.0 PAROXYSMAL ATRIAL FIBRILLATION (HCC): ICD-10-CM

## 2018-09-05 DIAGNOSIS — I25.5 ISCHEMIC CARDIOMYOPATHY: Primary | ICD-10-CM

## 2018-09-05 DIAGNOSIS — I47.29 NONSUSTAINED VENTRICULAR TACHYCARDIA: ICD-10-CM

## 2018-09-05 DIAGNOSIS — I47.29 PAROXYSMAL VENTRICULAR TACHYCARDIA: ICD-10-CM

## 2018-09-05 PROCEDURE — G8598 ASA/ANTIPLAT THER USED: HCPCS | Performed by: NURSE PRACTITIONER

## 2018-09-05 PROCEDURE — 1123F ACP DISCUSS/DSCN MKR DOCD: CPT | Performed by: NURSE PRACTITIONER

## 2018-09-05 PROCEDURE — 1101F PT FALLS ASSESS-DOCD LE1/YR: CPT | Performed by: NURSE PRACTITIONER

## 2018-09-05 PROCEDURE — G8427 DOCREV CUR MEDS BY ELIG CLIN: HCPCS | Performed by: NURSE PRACTITIONER

## 2018-09-05 PROCEDURE — 99214 OFFICE O/P EST MOD 30 MIN: CPT | Performed by: NURSE PRACTITIONER

## 2018-09-05 PROCEDURE — 93283 PRGRMG EVAL IMPLANTABLE DFB: CPT | Performed by: INTERNAL MEDICINE

## 2018-09-05 PROCEDURE — 4040F PNEUMOC VAC/ADMIN/RCVD: CPT | Performed by: NURSE PRACTITIONER

## 2018-09-05 PROCEDURE — G8420 CALC BMI NORM PARAMETERS: HCPCS | Performed by: NURSE PRACTITIONER

## 2018-09-05 PROCEDURE — 1036F TOBACCO NON-USER: CPT | Performed by: NURSE PRACTITIONER

## 2018-09-05 RX ORDER — METOPROLOL SUCCINATE 100 MG/1
100 TABLET, EXTENDED RELEASE ORAL DAILY
Qty: 30 TABLET | Refills: 5 | Status: SHIPPED | OUTPATIENT
Start: 2018-09-05 | End: 2018-09-05 | Stop reason: SDUPTHER

## 2018-09-05 RX ORDER — METOPROLOL SUCCINATE 100 MG/1
100 TABLET, EXTENDED RELEASE ORAL DAILY
Qty: 90 TABLET | Refills: 3 | Status: SHIPPED | OUTPATIENT
Start: 2018-09-05 | End: 2019-01-01 | Stop reason: SDUPTHER

## 2018-09-05 NOTE — PROGRESS NOTES
Interrogation and programming evaluation of the device shows normal function, with stable sensing and pacing thresholds. See interrogation for details. The pt will see Cheri Queen NP today. Recheck remotely 3 mos.

## 2018-09-05 NOTE — PROGRESS NOTES
[facility]   [facility]  Date: 9/5/2018    Chief Complaint   Patient presents with    Congestive Heart Failure       Cardiac HX: Terrell Romano is a 80 y.o. man with a h/o CAD, s/p CABG, ICMP with an EF now of 45-50%, s/p secondary prevention dual chamber [employer] ICD (Dr. Christopher Frost), had been seen in the hospital in 8/2016 for a WCT that appeared to be an atrial tach with ventricular tracking. AV delay was prolonged and the upper tracking rate was changed along with the addition of amiodarone. Patient had low blood pressure, dizziness and his amiodarone and Lasix doses were decreased. Amio d/c'd 6/8/17. Today: Patient is here for f/u. Patient had seen Dr. Kline 6/4/2018 at which time his VT zone was changed from 167 240 bpm to recognize and treat slower episodes of VT. He does not feel any heart racing or palpitations, no CP, SOB, dizziness or lightheadedness. Has not fallen however admits to being off balance and uses a walker for security. Multiple ecchymosis areas on arms bilat. Currently in assisted living with his wife.         Home medications:   Current Outpatient Prescriptions on File Prior to Visit   Medication Sig Dispense Refill    ALPRAZolam (XANAX) 0.25 MG tablet TAKE 1 TABLET BY MOUTH TWICE DAILY AS NEEDED FOR ANXIETY OR SLEEP 60 tablet 0    furosemide (LASIX) 40 MG tablet Take 1 tablet by mouth 2 times daily 60 tablet 10    furosemide (LASIX) 20 MG tablet Take 2 tablets by mouth 2 times daily 180 tablet 11    Cholecalciferol (VITAMIN D3) 2000 units CAPS Take 1 capsule by mouth daily 30 capsule 11    metoprolol succinate (TOPROL XL) 50 MG extended release tablet TAKE 1 TAB BY MOUTH DAILY (DX: _________) 30 tablet 10    fenofibrate (TRICOR) 54 MG tablet TAKE 1 TABLET BY MOUTH DAILY (DX: _________) 30 tablet 10    diltiazem (CARDIZEM CD) 120 MG extended release capsule TAKE 1 CAP BY MOUTH DAILY (DX: _________) 30 capsule 10    famotidine (PEPCID) 20 MG tablet TAKE 1 oriented. Cranial nerve appears intact, No Gross deficit   · Skin: Skin is warm and dry. No rash noted. + ecchymosis on arms   · Psychiatric: Has a normal mood, affect and behavior     Labs:  Reviewed. No results for input(s): NA, K, CL, CO2, PHOS, BUN, CREATININE in the last 72 hours. Invalid input(s): CA,  TSH  No results for input(s): WBC, HGB, HCT, MCV, PLT in the last 72 hours. Lab Results   Component Value Date    TROPONINI 0.04 12/12/2017     Lab Results   Component Value Date    BNP 1,626.0 12/11/2017    BNP 3,078.0 06/08/2017    BNP 1,156.0 08/24/2016     Lab Results   Component Value Date    PROTIME 14.4 12/11/2017    PROTIME 13.0 06/09/2017    PROTIME 12.6 08/24/2016    INR 1.27 12/11/2017    INR 1.15 06/09/2017    INR 1.10 08/24/2016     Lab Results   Component Value Date    CHOL 128 06/09/2017    HDL 54 06/09/2017    TRIG 67 06/09/2017       ECG: Personally reviewed: NSR, PVC, HR 88, , , QTc 467    ECHO:  12/2017  Summary   Note: rhythm is irregular.   Left ventricular size is normal . There is mild to moderate concentric left   ventricular hypertrophy. Left ventricular ejection fraction is probably   around 50%. Regional wall motion abnormalities cannot be 100% ruled out.  The   diastolic dysfunction is indeterminate due to heart rhythm.   Biatrial enlargement.   Mild-moderate mitral regurgitation is present.   Mild aortic regurgitation is present.  Cecillia Fujita is moderate tricuspid regurgitation with RVSP estimated at 33 mmHg.   There is a pleural effusion       Problem List:   Patient Active Problem List    Diagnosis Date Noted    Essential hypertension 01/27/2016     Priority: Medium    Automatic implantable cardioverter-defibrillator in situ 10/03/2011     Priority: Medium    Paroxysmal ventricular tachycardia ([address] 10/03/2011     Priority: Medium    Carotid artery stenosis 06/20/2011     Priority: Medium    Hypertrophy of prostate without urinary obstruction and other lower device. [id number] 4. TSH 1.91 (12/2017). CHF/ICMP, EF 45-50%  - EF now 50%  - NYHA class III  - QRS - paced 188  - On Toprol Xl 50 mg  - S/p secondary prevention MDT ICD  - Device check today shows 26 NSVT episodes, 3 with effective ATP, 5 AT/AF episodes, longest 12 minutes, burden <0.1%, AP 90%,  90%, other parameters stable    NSVT  - 26 episodes of NSVT seen on device - 3 with ATP  - Will increase Toprol XL to 100 mg daily - to monitor BP 3 x' s with change in dose, call for SBP < 110.   - ECG ordered and results personally reviewed     CAD  - On plavix, statin and ASA    pAF  - AF burden <0.1%  - On dilt 120 mg, Toprol XL 50 mg - will increase Toprol to 100   - Patient is not on anticoagulation at this point. His balance is precarious and he is at risk for falling. Will follow. F/u in 3 months with a device check. EF of 50%  No ACEi for systolic HF due to low BP, on BB  ASA and Statin for CAD  No anticoagulation for AF   No Tobacco use. All questions and concerns were addressed to the patient/family. Alternatives to my treatment were discussed. The note was completed using EMR. Every effort was made to ensure accuracy; however, inadvertent computerized transcription errors may be present.      Thank you for allowing me to participate in the care of [facility] [address] [facility]

## 2018-09-12 ENCOUNTER — TELEPHONE (OUTPATIENT)
Dept: INTERNAL MEDICINE | Age: 83
End: 2018-09-12

## 2018-09-12 DIAGNOSIS — F41.9 ANXIETY: ICD-10-CM

## 2018-09-12 RX ORDER — ALPRAZOLAM 0.25 MG/1
TABLET ORAL
Qty: 60 TABLET | Refills: 1 | OUTPATIENT
Start: 2018-09-12 | End: 2018-11-12

## 2018-09-12 NOTE — TELEPHONE ENCOUNTER
Patient needs a refill of   ALPRAZolam (XANAX) 0.25 MG tablet   08/14/18 09/13/18 Tigre Mitchell MD     TAKE 1 TABLET BY MOUTH TWICE DAILY AS NEEDED FOR ANXIETY OR SLEEP     [facility] [id number] [facility] Kandis PRESTON [phone] - F [phone] [[facility] Preferred]   [phone]

## 2018-09-17 RX ORDER — ESCITALOPRAM OXALATE 5 MG/1
TABLET ORAL
Qty: 90 TABLET | Refills: 0 | Status: SHIPPED | OUTPATIENT
Start: 2018-09-17 | End: 2019-01-11 | Stop reason: SDUPTHER

## 2018-09-17 RX ORDER — METOPROLOL SUCCINATE 50 MG/1
TABLET, EXTENDED RELEASE ORAL
Qty: 30 TABLET | Refills: 0 | Status: SHIPPED | OUTPATIENT
Start: 2018-09-17 | End: 2019-03-05

## 2018-09-20 ENCOUNTER — TELEPHONE (OUTPATIENT)
Dept: INTERNAL MEDICINE | Age: 83
End: 2018-09-20

## 2018-09-25 ENCOUNTER — CARE COORDINATION (OUTPATIENT)
Dept: CARE COORDINATION | Age: 83
End: 2018-09-25

## 2018-09-25 DIAGNOSIS — Z23 NEED FOR SHINGLES VACCINE: Primary | ICD-10-CM

## 2018-09-26 RX ORDER — ALLOPURINOL 100 MG/1
100 TABLET ORAL DAILY
Qty: 90 TABLET | Refills: 0 | Status: SHIPPED | OUTPATIENT
Start: 2018-09-26 | End: 2018-12-23 | Stop reason: SDUPTHER

## 2018-09-26 RX ORDER — MULTIVIT WITH MINERALS/LUTEIN
1 TABLET ORAL DAILY
Qty: 90 TABLET | Refills: 0 | Status: SHIPPED | OUTPATIENT
Start: 2018-09-26 | End: 2018-12-23 | Stop reason: SDUPTHER

## 2018-09-26 NOTE — CARE COORDINATION
as needed for Pain or Fever 11/8/17   Dereje Malagon MD   levothyroxine (SYNTHROID) 100 MCG tablet Take 1 tablet by mouth daily 11/3/17   Dereje Malagon MD   atorvastatin (LIPITOR) 40 MG tablet Take 1 tablet by mouth every morning 11/3/17   Dereje Malagon MD   allopurinol (ZYLOPRIM) 100 MG tablet Take 1 tablet by mouth daily 11/3/17   Dereje Malagon MD   Melatonin 5 MG CAPS TAKE 1 CAPSULE BY MOUTH NIGHTLY AS NEEDED FOR SLEEP 11/3/17   Dereje Malagon MD   fluticasone [facility]) 50 MCG/ACT nasal spray USE 2SPRAYS BY NASAL ROUTE DAILY 11/3/17   Dereje Malagon MD   fish oil-omega-3 fatty acids 1000 MG capsule Take 2 g by mouth daily.     Historical Provider, MD       Future Appointments  Date Time Provider Gwen Perez   10/30/2018 1:00 PM Osmani Vicente MD [facility]   11/13/2018 11:00 AM Dereej Malagon MD [facility] IM [facility]   12/4/2018 9:30 AM SCHEDULE, [facility] PHONE TRANSMISSION [place] [facility]   12/6/2018 10:15 AM SCHEDULE, [facility] PACERS [place] [facility]   12/6/2018 10:30 AM Lynda Schwartz MD [facility]     ,   Congestive Heart Failure Assessment    Are you currently restricting fluids?:  No Restriction  Do you understand a low sodium diet?:  Yes  Do you understand how to read food labels?:  Yes  How many restaurant meals do you eat per week?:  0  Do you salt your food before tasting it?:  No     No patient-reported symptoms      Symptoms:   None:  Yes      Symptom course:  stable  Weight trend:  stable  Salt intake watch compared to last visit:  stable      and   General Assessment    Do you have any symptoms that are causing concern?:  No

## 2018-10-02 ENCOUNTER — CARE COORDINATION (OUTPATIENT)
Dept: CARE COORDINATION | Age: 83
End: 2018-10-02

## 2018-10-02 NOTE — CARE COORDINATION
I have not seen a form from his insurance company recently. There is a form letter and release on his chart dated 9/25/18 which asks for his entire record from 1/1/17 to the present.

## 2018-10-25 ENCOUNTER — CARE COORDINATION (OUTPATIENT)
Dept: CARE COORDINATION | Age: 83
End: 2018-10-25

## 2018-10-25 NOTE — PATIENT INSTRUCTIONS
Patient Education        Preventing Outdoor Falls: Care Instructions  Your Care Instructions    Worries about falls don't need to keep you indoors. Outdoor activities like walking have big benefits for your health. You will need to watch your step and learn a few safety measures. If you are worried about having a fall outdoors, ask your doctor about exercises, classes, or physical therapy that may help. You can learn ways to gain strength, flexibility, and balance. Ask if it might help to use a cane or walker. You can make your time outdoors safer with a few simple measures. Follow-up care is a key part of your treatment and safety. Be sure to make and go to all appointments, and call your doctor if you are having problems. It's also a good idea to know your test results and keep a list of the medicines you take. How can you prevent falls outdoors? · Wear shoes with firm soles and low heels. If you have to walk on an icy surface, use grippers that can be worn over your shoes in bad weather. · Be extra careful if weather is bad. Walk on the grass when the sidewalks are slick. If you live in a place that gets snow and ice in the winter, sprinkle salt on slippery stairs and sidewalks. · Be careful getting on or off buses and trains or getting in and out of cars. If handrails are available, use them. · Be careful when you cross the street. Look for crosswalks or places where curb cuts or ramps are present. · Try not to hurry, especially if you are carrying something. · Be cautious in parking lots or garages. There may be curbs or changes in pavement, or the height of the pavement may vary. · Make sure to wear the correct eyeglasses, if you need them. Reading glasses or bifocals can make it harder to see hazards that might be in your way. · If you are walking outdoors for exercise, try to:  ¨ Walk in well-lighted, well-maintained areas.  These include high school or college tracks, shopping malls, and public

## 2018-11-13 ENCOUNTER — OFFICE VISIT (OUTPATIENT)
Dept: INTERNAL MEDICINE CLINIC | Age: 83
End: 2018-11-13
Payer: MEDICARE

## 2018-11-13 VITALS
OXYGEN SATURATION: 96 % | WEIGHT: 135 LBS | SYSTOLIC BLOOD PRESSURE: 136 MMHG | BODY MASS INDEX: 21.19 KG/M2 | DIASTOLIC BLOOD PRESSURE: 60 MMHG | RESPIRATION RATE: 12 BRPM | HEART RATE: 60 BPM | HEIGHT: 67 IN

## 2018-11-13 DIAGNOSIS — F41.9 ANXIETY: ICD-10-CM

## 2018-11-13 DIAGNOSIS — E78.2 MIXED HYPERLIPIDEMIA: ICD-10-CM

## 2018-11-13 DIAGNOSIS — I10 ESSENTIAL HYPERTENSION: Primary | ICD-10-CM

## 2018-11-13 PROCEDURE — 99214 OFFICE O/P EST MOD 30 MIN: CPT | Performed by: INTERNAL MEDICINE

## 2018-11-13 PROCEDURE — 1036F TOBACCO NON-USER: CPT | Performed by: INTERNAL MEDICINE

## 2018-11-13 PROCEDURE — 4040F PNEUMOC VAC/ADMIN/RCVD: CPT | Performed by: INTERNAL MEDICINE

## 2018-11-13 PROCEDURE — G8598 ASA/ANTIPLAT THER USED: HCPCS | Performed by: INTERNAL MEDICINE

## 2018-11-13 PROCEDURE — G8482 FLU IMMUNIZE ORDER/ADMIN: HCPCS | Performed by: INTERNAL MEDICINE

## 2018-11-13 PROCEDURE — 1123F ACP DISCUSS/DSCN MKR DOCD: CPT | Performed by: INTERNAL MEDICINE

## 2018-11-13 PROCEDURE — G8510 SCR DEP NEG, NO PLAN REQD: HCPCS | Performed by: INTERNAL MEDICINE

## 2018-11-13 PROCEDURE — G8427 DOCREV CUR MEDS BY ELIG CLIN: HCPCS | Performed by: INTERNAL MEDICINE

## 2018-11-13 PROCEDURE — 1101F PT FALLS ASSESS-DOCD LE1/YR: CPT | Performed by: INTERNAL MEDICINE

## 2018-11-13 PROCEDURE — G8420 CALC BMI NORM PARAMETERS: HCPCS | Performed by: INTERNAL MEDICINE

## 2018-11-13 ASSESSMENT — PATIENT HEALTH QUESTIONNAIRE - PHQ9
SUM OF ALL RESPONSES TO PHQ QUESTIONS 1-9: 0
SUM OF ALL RESPONSES TO PHQ9 QUESTIONS 1 & 2: 0
2. FEELING DOWN, DEPRESSED OR HOPELESS: 0
1. LITTLE INTEREST OR PLEASURE IN DOING THINGS: 0
SUM OF ALL RESPONSES TO PHQ QUESTIONS 1-9: 0

## 2018-11-13 ASSESSMENT — ENCOUNTER SYMPTOMS
ABDOMINAL PAIN: 0
CHEST TIGHTNESS: 0
BACK PAIN: 0
SHORTNESS OF BREATH: 0
EYE REDNESS: 0
NAUSEA: 0

## 2018-11-13 NOTE — PROGRESS NOTES
TAKE 1 TABLET BY MOUTH DAILY 30 tablet 0    metoprolol succinate (TOPROL XL) 100 MG extended release tablet Take 1 tablet by mouth daily 90 tablet 3    furosemide (LASIX) 40 MG tablet Take 1 tablet by mouth 2 times daily 60 tablet 10    furosemide (LASIX) 20 MG tablet Take 2 tablets by mouth 2 times daily 180 tablet 11    Cholecalciferol (VITAMIN D3) 2000 units CAPS Take 1 capsule by mouth daily 30 capsule 11    fenofibrate (TRICOR) 54 MG tablet TAKE 1 TABLET BY MOUTH DAILY (DX: _________) 30 tablet 10    diltiazem (CARDIZEM CD) 120 MG extended release capsule TAKE 1 CAP BY MOUTH DAILY (DX: _________) 30 capsule 10    famotidine (PEPCID) 20 MG tablet TAKE 1 TABLET BY MOUTH DAILY AS NEEDED FOR REFLUX 90 tablet 3    aspirin 81 MG EC tablet Take 1 tablet by mouth daily 90 tablet 3    clopidogrel (PLAVIX) 75 MG tablet Take 1 tablet by mouth daily 90 tablet 3    Acetaminophen (APAP) 325 MG TABS Take 650 mg by mouth every 6 hours as needed for Pain or Fever 100 tablet 3    levothyroxine (SYNTHROID) 100 MCG tablet Take 1 tablet by mouth daily 90 tablet 3    Melatonin 5 MG CAPS TAKE 1 CAPSULE BY MOUTH NIGHTLY AS NEEDED FOR SLEEP 90 capsule 3    fluticasone (FLONASE) 50 MCG/ACT nasal spray USE 2SPRAYS BY NASAL ROUTE DAILY 3 Bottle 3    fish oil-omega-3 fatty acids 1000 MG capsule Take 2 g by mouth daily. No current facility-administered medications on file prior to visit. No Known Allergies    Review of Systems   Constitutional: Negative for fatigue, fever and unexpected weight change. HENT: Negative for hearing loss. Eyes: Negative for redness and visual disturbance. Respiratory: Negative for chest tightness and shortness of breath. Cardiovascular: Negative for chest pain and palpitations. Gastrointestinal: Negative for abdominal pain and nausea. Endocrine: Negative for polydipsia and polyuria. Genitourinary: Negative for dysuria and hematuria.    Musculoskeletal: Negative for arthralgias, back pain and neck pain. Skin: Negative for rash and wound. Neurological: Negative for dizziness and headaches. Hematological: Negative for adenopathy. Does not bruise/bleed easily. Psychiatric/Behavioral: Negative for agitation. The patient is not nervous/anxious. Objective:   Physical Exam   Constitutional: He is oriented to person, place, and time. He appears well-developed and well-nourished. Cardiovascular: Normal rate, regular rhythm and normal heart sounds. No murmur heard. Pulmonary/Chest: Effort normal and breath sounds normal. He has no rales. Musculoskeletal: He exhibits no edema. Neurological: He is alert and oriented to person, place, and time. Skin: Skin is warm and dry. No rash noted. Assessment and plan       1. Essential hypertension  Stable. Continue current medicines. 2. Mixed hyperlipidemia  Stable. Continue statin therapy. 3. Anxiety  Stable. Continue current medicine. Alprazolam use has cut back significantly.

## 2018-12-03 RX ORDER — LEVOTHYROXINE SODIUM 0.1 MG/1
100 TABLET ORAL DAILY
Qty: 90 TABLET | Refills: 2 | Status: SHIPPED | OUTPATIENT
Start: 2018-12-03 | End: 2019-01-01 | Stop reason: SDUPTHER

## 2018-12-06 ENCOUNTER — PROCEDURE VISIT (OUTPATIENT)
Dept: CARDIOLOGY CLINIC | Age: 83
End: 2018-12-06
Payer: MEDICARE

## 2018-12-06 ENCOUNTER — OFFICE VISIT (OUTPATIENT)
Dept: CARDIOLOGY CLINIC | Age: 83
End: 2018-12-06
Payer: MEDICARE

## 2018-12-06 VITALS
WEIGHT: 134.4 LBS | BODY MASS INDEX: 21.09 KG/M2 | OXYGEN SATURATION: 92 % | SYSTOLIC BLOOD PRESSURE: 110 MMHG | HEIGHT: 67 IN | HEART RATE: 93 BPM | DIASTOLIC BLOOD PRESSURE: 70 MMHG

## 2018-12-06 DIAGNOSIS — I47.29 PAROXYSMAL VENTRICULAR TACHYCARDIA: ICD-10-CM

## 2018-12-06 DIAGNOSIS — I25.5 ISCHEMIC CARDIOMYOPATHY: Primary | ICD-10-CM

## 2018-12-06 DIAGNOSIS — Z95.810 AUTOMATIC IMPLANTABLE CARDIOVERTER-DEFIBRILLATOR IN SITU: ICD-10-CM

## 2018-12-06 DIAGNOSIS — I25.10 CORONARY ARTERY DISEASE INVOLVING NATIVE CORONARY ARTERY OF NATIVE HEART WITHOUT ANGINA PECTORIS: ICD-10-CM

## 2018-12-06 DIAGNOSIS — I47.29 NONSUSTAINED VENTRICULAR TACHYCARDIA: ICD-10-CM

## 2018-12-06 DIAGNOSIS — I48.0 PAROXYSMAL ATRIAL FIBRILLATION (HCC): ICD-10-CM

## 2018-12-06 PROCEDURE — G8598 ASA/ANTIPLAT THER USED: HCPCS | Performed by: INTERNAL MEDICINE

## 2018-12-06 PROCEDURE — 99214 OFFICE O/P EST MOD 30 MIN: CPT | Performed by: INTERNAL MEDICINE

## 2018-12-06 PROCEDURE — G8420 CALC BMI NORM PARAMETERS: HCPCS | Performed by: INTERNAL MEDICINE

## 2018-12-06 PROCEDURE — G8482 FLU IMMUNIZE ORDER/ADMIN: HCPCS | Performed by: INTERNAL MEDICINE

## 2018-12-06 PROCEDURE — 1036F TOBACCO NON-USER: CPT | Performed by: INTERNAL MEDICINE

## 2018-12-06 PROCEDURE — 4040F PNEUMOC VAC/ADMIN/RCVD: CPT | Performed by: INTERNAL MEDICINE

## 2018-12-06 PROCEDURE — 93283 PRGRMG EVAL IMPLANTABLE DFB: CPT | Performed by: INTERNAL MEDICINE

## 2018-12-06 PROCEDURE — 1101F PT FALLS ASSESS-DOCD LE1/YR: CPT | Performed by: INTERNAL MEDICINE

## 2018-12-06 PROCEDURE — 1123F ACP DISCUSS/DSCN MKR DOCD: CPT | Performed by: INTERNAL MEDICINE

## 2018-12-06 PROCEDURE — G8427 DOCREV CUR MEDS BY ELIG CLIN: HCPCS | Performed by: INTERNAL MEDICINE

## 2018-12-06 PROCEDURE — 93000 ELECTROCARDIOGRAM COMPLETE: CPT | Performed by: INTERNAL MEDICINE

## 2018-12-06 NOTE — PROGRESS NOTES
Interrogation and programming evaluation of the device shows normal function, with stable sensing and pacing thresholds. See interrogation for details. The pt will see Dr Linda Booth today. Recheck remotely 3 mos.

## 2018-12-07 ENCOUNTER — CARE COORDINATION (OUTPATIENT)
Dept: CARE COORDINATION | Age: 83
End: 2018-12-07

## 2018-12-10 RX ORDER — ASPIRIN 81 MG/1
81 TABLET, COATED ORAL DAILY
Qty: 90 TABLET | Refills: 0 | Status: SHIPPED | OUTPATIENT
Start: 2018-12-10 | End: 2019-03-07 | Stop reason: SDUPTHER

## 2018-12-24 RX ORDER — MULTIVIT WITH MINERALS/LUTEIN
1 TABLET ORAL DAILY
Qty: 90 TABLET | Refills: 2 | Status: SHIPPED | OUTPATIENT
Start: 2018-12-24 | End: 2019-01-01 | Stop reason: SDUPTHER

## 2018-12-24 RX ORDER — DILTIAZEM HYDROCHLORIDE 120 MG/1
CAPSULE, EXTENDED RELEASE ORAL
Qty: 90 CAPSULE | Refills: 2 | Status: SHIPPED | OUTPATIENT
Start: 2018-12-24 | End: 2019-03-05

## 2018-12-24 RX ORDER — ALLOPURINOL 100 MG/1
100 TABLET ORAL DAILY
Qty: 90 TABLET | Refills: 2 | Status: SHIPPED | OUTPATIENT
Start: 2018-12-24 | End: 2019-01-01 | Stop reason: SDUPTHER

## 2019-01-01 ENCOUNTER — HOSPITAL ENCOUNTER (EMERGENCY)
Age: 84
Discharge: HOME | End: 2019-08-20
Attending: EMERGENCY MEDICINE
Payer: MEDICARE

## 2019-01-01 ENCOUNTER — OFFICE VISIT (OUTPATIENT)
Dept: CARDIOLOGY CLINIC | Age: 84
End: 2019-01-01
Payer: MEDICARE

## 2019-01-01 ENCOUNTER — CARE COORDINATION (OUTPATIENT)
Dept: CARE COORDINATION | Age: 84
End: 2019-01-01

## 2019-01-01 ENCOUNTER — ANESTHESIA EVENT (OUTPATIENT)
Dept: ENDOSCOPY | Age: 84
DRG: 377 | End: 2019-01-01
Payer: MEDICARE

## 2019-01-01 ENCOUNTER — ANESTHESIA (OUTPATIENT)
Dept: ENDOSCOPY | Age: 84
DRG: 377 | End: 2019-01-01
Payer: MEDICARE

## 2019-01-01 ENCOUNTER — APPOINTMENT (OUTPATIENT)
Dept: GENERAL RADIOLOGY | Age: 84
DRG: 377 | End: 2019-01-01
Payer: MEDICARE

## 2019-01-01 ENCOUNTER — PROCEDURE VISIT (OUTPATIENT)
Dept: CARDIOLOGY CLINIC | Age: 84
End: 2019-01-01
Payer: MEDICARE

## 2019-01-01 ENCOUNTER — TELEPHONE (OUTPATIENT)
Dept: ORTHOPEDIC SURGERY | Age: 84
End: 2019-01-01

## 2019-01-01 ENCOUNTER — TELEPHONE (OUTPATIENT)
Dept: INTERNAL MEDICINE CLINIC | Age: 84
End: 2019-01-01

## 2019-01-01 ENCOUNTER — APPOINTMENT (OUTPATIENT)
Dept: CT IMAGING | Age: 84
End: 2019-01-01
Payer: MEDICARE

## 2019-01-01 ENCOUNTER — HOSPITAL ENCOUNTER (INPATIENT)
Age: 84
LOS: 9 days | Discharge: NURSING, REHAB OR LONG-TERM CARE | DRG: 377 | End: 2019-05-14
Attending: EMERGENCY MEDICINE | Admitting: HOSPITALIST
Payer: MEDICARE

## 2019-01-01 ENCOUNTER — CARE COORDINATION (OUTPATIENT)
Dept: CASE MANAGEMENT | Age: 84
End: 2019-01-01

## 2019-01-01 ENCOUNTER — TELEPHONE (OUTPATIENT)
Dept: OTHER | Facility: CLINIC | Age: 84
End: 2019-01-01

## 2019-01-01 ENCOUNTER — TELEPHONE (OUTPATIENT)
Dept: CARDIOLOGY CLINIC | Age: 84
End: 2019-01-01

## 2019-01-01 VITALS
DIASTOLIC BLOOD PRESSURE: 64 MMHG | SYSTOLIC BLOOD PRESSURE: 146 MMHG | BODY MASS INDEX: 18.11 KG/M2 | TEMPERATURE: 98.2 F | HEART RATE: 59 BPM | WEIGHT: 119.13 LBS | OXYGEN SATURATION: 100 % | RESPIRATION RATE: 19 BRPM

## 2019-01-01 VITALS
WEIGHT: 124.2 LBS | BODY MASS INDEX: 20.05 KG/M2 | DIASTOLIC BLOOD PRESSURE: 60 MMHG | HEART RATE: 64 BPM | SYSTOLIC BLOOD PRESSURE: 160 MMHG

## 2019-01-01 VITALS
SYSTOLIC BLOOD PRESSURE: 132 MMHG | WEIGHT: 129 LBS | HEART RATE: 63 BPM | DIASTOLIC BLOOD PRESSURE: 34 MMHG | BODY MASS INDEX: 19.55 KG/M2 | HEIGHT: 68 IN

## 2019-01-01 VITALS
HEIGHT: 68 IN | HEART RATE: 60 BPM | TEMPERATURE: 96.5 F | OXYGEN SATURATION: 94 % | SYSTOLIC BLOOD PRESSURE: 137 MMHG | WEIGHT: 123.9 LBS | BODY MASS INDEX: 18.78 KG/M2 | DIASTOLIC BLOOD PRESSURE: 52 MMHG | RESPIRATION RATE: 16 BRPM

## 2019-01-01 VITALS
HEART RATE: 103 BPM | RESPIRATION RATE: 16 BRPM | WEIGHT: 119.1 LBS | HEIGHT: 66 IN | TEMPERATURE: 97 F | SYSTOLIC BLOOD PRESSURE: 116 MMHG | DIASTOLIC BLOOD PRESSURE: 65 MMHG | BODY MASS INDEX: 19.14 KG/M2 | OXYGEN SATURATION: 94 %

## 2019-01-01 VITALS — DIASTOLIC BLOOD PRESSURE: 41 MMHG | SYSTOLIC BLOOD PRESSURE: 100 MMHG | OXYGEN SATURATION: 93 %

## 2019-01-01 DIAGNOSIS — F41.9 ANXIETY: ICD-10-CM

## 2019-01-01 DIAGNOSIS — I48.0 PAROXYSMAL ATRIAL FIBRILLATION (HCC): ICD-10-CM

## 2019-01-01 DIAGNOSIS — I10 ESSENTIAL HYPERTENSION: ICD-10-CM
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
## 2019-01-01 DIAGNOSIS — R06.09 DYSPNEA ON EXERTION: ICD-10-CM

## 2019-01-01 DIAGNOSIS — Z95.810 ICD (IMPLANTABLE CARDIOVERTER-DEFIBRILLATOR), DUAL, IN SITU: ICD-10-CM

## 2019-01-01 DIAGNOSIS — I50.42 CHRONIC COMBINED SYSTOLIC AND DIASTOLIC CONGESTIVE HEART FAILURE (HCC): ICD-10-CM

## 2019-01-01 DIAGNOSIS — I25.10 CORONARY ARTERY DISEASE INVOLVING NATIVE HEART WITHOUT ANGINA PECTORIS, UNSPECIFIED VESSEL OR LESION TYPE: ICD-10-CM

## 2019-01-01 DIAGNOSIS — I50.22 CHRONIC SYSTOLIC HEART FAILURE (HCC): ICD-10-CM

## 2019-01-01 DIAGNOSIS — S41.112A SKIN TEAR OF LEFT UPPER EXTREMITY: ICD-10-CM

## 2019-01-01 DIAGNOSIS — I10 ESSENTIAL HYPERTENSION: Primary | ICD-10-CM

## 2019-01-01 DIAGNOSIS — I25.5 ISCHEMIC CARDIOMYOPATHY: Primary | ICD-10-CM

## 2019-01-01 DIAGNOSIS — I50.23 ACUTE ON CHRONIC SYSTOLIC HEART FAILURE (HCC): Primary | ICD-10-CM

## 2019-01-01 DIAGNOSIS — I47.29 PAROXYSMAL VENTRICULAR TACHYCARDIA: ICD-10-CM

## 2019-01-01 DIAGNOSIS — I50.9 CHRONIC CONGESTIVE HEART FAILURE, UNSPECIFIED HEART FAILURE TYPE (HCC): Primary | ICD-10-CM

## 2019-01-01 DIAGNOSIS — Z95.810 AUTOMATIC IMPLANTABLE CARDIOVERTER-DEFIBRILLATOR IN SITU: ICD-10-CM

## 2019-01-01 LAB
A/G RATIO: 1.2 (ref 1.1–2.2)
ABO/RH: NORMAL
AFB CULTURE (MYCOBACTERIA): NORMAL
AFB SMEAR: NORMAL
ALBUMIN SERPL-MCNC: 3.1 G/DL (ref 3.4–5)
ALBUMIN SERPL-MCNC: 3.6 G/DL (ref 3.4–5)
ALP BLD-CCNC: 45 U/L (ref 40–129)
ALT SERPL-CCNC: 8 U/L (ref 10–40)
ANION GAP SERPL CALCULATED.3IONS-SCNC: 11 MMOL/L (ref 3–16)
ANION GAP SERPL CALCULATED.3IONS-SCNC: 12 MMOL/L (ref 3–16)
ANION GAP SERPL CALCULATED.3IONS-SCNC: 13 MMOL/L (ref 3–16)
ANION GAP SERPL CALCULATED.3IONS-SCNC: 14 MMOL/L (ref 3–16)
ANION GAP SERPL CALCULATED.3IONS-SCNC: 14 MMOL/L (ref 3–16)
ANION GAP SERPL CALCULATED.3IONS-SCNC: 19 MMOL/L (ref 3–16)
ANION GAP SERPL CALCULATED.3IONS-SCNC: 9 MMOL/L (ref 3–16)
ANTIBODY SCREEN: NORMAL
APTT: 31 SEC (ref 26–36)
AST SERPL-CCNC: 19 U/L (ref 15–37)
BASE EXCESS VENOUS: 2 (ref -3–3)
BASOPHILS ABSOLUTE: 0 K/UL (ref 0–0.2)
BASOPHILS ABSOLUTE: 0.1 K/UL (ref 0–0.2)
BASOPHILS RELATIVE PERCENT: 0.4 %
BASOPHILS RELATIVE PERCENT: 0.6 %
BASOPHILS RELATIVE PERCENT: 0.7 %
BASOPHILS RELATIVE PERCENT: 0.8 %
BASOPHILS RELATIVE PERCENT: 1 %
BILIRUB SERPL-MCNC: 0.4 MG/DL (ref 0–1)
BLOOD BANK DISPENSE STATUS: NORMAL
BLOOD BANK PRODUCT CODE: NORMAL
BLOOD CULTURE, ROUTINE: NORMAL
BPU ID: NORMAL
BUN BLDV-MCNC: 35 MG/DL (ref 7–20)
BUN BLDV-MCNC: 35 MG/DL (ref 7–20)
BUN BLDV-MCNC: 43 MG/DL (ref 7–20)
BUN BLDV-MCNC: 51 MG/DL (ref 7–20)
BUN BLDV-MCNC: 57 MG/DL (ref 7–20)
BUN BLDV-MCNC: 61 MG/DL (ref 7–20)
BUN BLDV-MCNC: 67 MG/DL (ref 7–20)
BUN BLDV-MCNC: 67 MG/DL (ref 7–20)
BUN BLDV-MCNC: 70 MG/DL (ref 7–20)
BUN BLDV-MCNC: 73 MG/DL (ref 7–20)
BUN BLDV-MCNC: 83 MG/DL (ref 7–20)
BUN BLDV-MCNC: 86 MG/DL (ref 7–20)
BUN BLDV-MCNC: 87 MG/DL (ref 7–20)
BUN BLDV-MCNC: 93 MG/DL (ref 7–20)
CALCIUM SERPL-MCNC: 7.9 MG/DL (ref 8.3–10.6)
CALCIUM SERPL-MCNC: 8.3 MG/DL (ref 8.3–10.6)
CALCIUM SERPL-MCNC: 8.4 MG/DL (ref 8.3–10.6)
CALCIUM SERPL-MCNC: 8.6 MG/DL (ref 8.3–10.6)
CALCIUM SERPL-MCNC: 8.7 MG/DL (ref 8.3–10.6)
CALCIUM SERPL-MCNC: 8.9 MG/DL (ref 8.3–10.6)
CALCIUM SERPL-MCNC: 8.9 MG/DL (ref 8.3–10.6)
CALCIUM SERPL-MCNC: 9 MG/DL (ref 8.3–10.6)
CALCIUM SERPL-MCNC: 9.1 MG/DL (ref 8.3–10.6)
CALCIUM SERPL-MCNC: 9.3 MG/DL (ref 8.3–10.6)
CALCIUM SERPL-MCNC: 9.3 MG/DL (ref 8.3–10.6)
CALCIUM SERPL-MCNC: 9.6 MG/DL (ref 8.3–10.6)
CHLORIDE BLD-SCNC: 103 MMOL/L (ref 99–110)
CHLORIDE BLD-SCNC: 104 MMOL/L (ref 99–110)
CHLORIDE BLD-SCNC: 106 MMOL/L (ref 99–110)
CHLORIDE BLD-SCNC: 109 MMOL/L (ref 99–110)
CHLORIDE BLD-SCNC: 110 MMOL/L (ref 99–110)
CHLORIDE BLD-SCNC: 110 MMOL/L (ref 99–110)
CHLORIDE BLD-SCNC: 111 MMOL/L (ref 99–110)
CHLORIDE BLD-SCNC: 111 MMOL/L (ref 99–110)
CHLORIDE BLD-SCNC: 113 MMOL/L (ref 99–110)
CHLORIDE BLD-SCNC: 114 MMOL/L (ref 99–110)
CHLORIDE BLD-SCNC: 92 MMOL/L (ref 99–110)
CHLORIDE BLD-SCNC: 92 MMOL/L (ref 99–110)
CHLORIDE BLD-SCNC: 97 MMOL/L (ref 99–110)
CHLORIDE BLD-SCNC: 99 MMOL/L (ref 99–110)
CO2: 21 MMOL/L (ref 21–32)
CO2: 23 MMOL/L (ref 21–32)
CO2: 24 MMOL/L (ref 21–32)
CO2: 25 MMOL/L (ref 21–32)
CO2: 26 MMOL/L (ref 21–32)
CO2: 26 MMOL/L (ref 21–32)
CO2: 27 MMOL/L (ref 21–32)
CO2: 30 MMOL/L (ref 21–32)
CO2: 31 MMOL/L (ref 21–32)
CO2: 32 MMOL/L (ref 21–32)
CREAT SERPL-MCNC: 1.5 MG/DL (ref 0.8–1.3)
CREAT SERPL-MCNC: 1.6 MG/DL (ref 0.8–1.3)
CREAT SERPL-MCNC: 1.6 MG/DL (ref 0.8–1.3)
CREAT SERPL-MCNC: 1.7 MG/DL (ref 0.8–1.3)
CREAT SERPL-MCNC: 1.9 MG/DL (ref 0.8–1.3)
CREAT SERPL-MCNC: 2.1 MG/DL (ref 0.8–1.3)
CREAT SERPL-MCNC: 2.2 MG/DL (ref 0.8–1.3)
CREAT SERPL-MCNC: 2.2 MG/DL (ref 0.8–1.3)
CREAT SERPL-MCNC: 2.3 MG/DL (ref 0.8–1.3)
CREAT SERPL-MCNC: 2.3 MG/DL (ref 0.8–1.3)
CREAT SERPL-MCNC: 2.4 MG/DL (ref 0.8–1.3)
CREAT SERPL-MCNC: 2.6 MG/DL (ref 0.8–1.3)
CREAT SERPL-MCNC: 2.6 MG/DL (ref 0.8–1.3)
CREAT SERPL-MCNC: 2.8 MG/DL (ref 0.8–1.3)
CULTURE, BLOOD 2: NORMAL
DESCRIPTION BLOOD BANK: NORMAL
EKG ATRIAL RATE: 105 BPM
EKG ATRIAL RATE: 131 BPM
EKG ATRIAL RATE: 63 BPM
EKG DIAGNOSIS: NORMAL
EKG P AXIS: -17 DEGREES
EKG P-R INTERVAL: 326 MS
EKG Q-T INTERVAL: 392 MS
EKG Q-T INTERVAL: 466 MS
EKG Q-T INTERVAL: 468 MS
EKG QRS DURATION: 100 MS
EKG QRS DURATION: 102 MS
EKG QRS DURATION: 200 MS
EKG QTC CALCULATION (BAZETT): 476 MS
EKG QTC CALCULATION (BAZETT): 482 MS
EKG QTC CALCULATION (BAZETT): 615 MS
EKG R AXIS: -68 DEGREES
EKG R AXIS: -71 DEGREES
EKG R AXIS: -87 DEGREES
EKG T AXIS: 173 DEGREES
EKG T AXIS: 265 DEGREES
EKG T AXIS: 88 DEGREES
EKG VENTRICULAR RATE: 104 BPM
EKG VENTRICULAR RATE: 63 BPM
EKG VENTRICULAR RATE: 91 BPM
EOSINOPHILS ABSOLUTE: 0.1 K/UL (ref 0–0.6)
EOSINOPHILS ABSOLUTE: 0.1 K/UL (ref 0–0.6)
EOSINOPHILS ABSOLUTE: 0.3 K/UL (ref 0–0.6)
EOSINOPHILS RELATIVE PERCENT: 0.9 %
EOSINOPHILS RELATIVE PERCENT: 2.5 %
EOSINOPHILS RELATIVE PERCENT: 4.7 %
EOSINOPHILS RELATIVE PERCENT: 5.3 %
EOSINOPHILS RELATIVE PERCENT: 5.6 %
FERRITIN: 32 NG/ML (ref 30–400)
GFR AFRICAN AMERICAN: 26
GFR AFRICAN AMERICAN: 28
GFR AFRICAN AMERICAN: 28
GFR AFRICAN AMERICAN: 31
GFR AFRICAN AMERICAN: 32
GFR AFRICAN AMERICAN: 32
GFR AFRICAN AMERICAN: 34
GFR AFRICAN AMERICAN: 34
GFR AFRICAN AMERICAN: 36
GFR AFRICAN AMERICAN: 40
GFR AFRICAN AMERICAN: 46
GFR AFRICAN AMERICAN: 49
GFR AFRICAN AMERICAN: 49
GFR AFRICAN AMERICAN: 53
GFR NON-AFRICAN AMERICAN: 21
GFR NON-AFRICAN AMERICAN: 23
GFR NON-AFRICAN AMERICAN: 23
GFR NON-AFRICAN AMERICAN: 25
GFR NON-AFRICAN AMERICAN: 27
GFR NON-AFRICAN AMERICAN: 27
GFR NON-AFRICAN AMERICAN: 28
GFR NON-AFRICAN AMERICAN: 28
GFR NON-AFRICAN AMERICAN: 30
GFR NON-AFRICAN AMERICAN: 33
GFR NON-AFRICAN AMERICAN: 38
GFR NON-AFRICAN AMERICAN: 40
GFR NON-AFRICAN AMERICAN: 40
GFR NON-AFRICAN AMERICAN: 44
GLOBULIN: 3 G/DL
GLUCOSE BLD-MCNC: 100 MG/DL (ref 70–99)
GLUCOSE BLD-MCNC: 102 MG/DL (ref 70–99)
GLUCOSE BLD-MCNC: 102 MG/DL (ref 70–99)
GLUCOSE BLD-MCNC: 103 MG/DL (ref 70–99)
GLUCOSE BLD-MCNC: 103 MG/DL (ref 70–99)
GLUCOSE BLD-MCNC: 105 MG/DL (ref 70–99)
GLUCOSE BLD-MCNC: 107 MG/DL (ref 70–99)
GLUCOSE BLD-MCNC: 111 MG/DL (ref 70–99)
GLUCOSE BLD-MCNC: 114 MG/DL (ref 70–99)
GLUCOSE BLD-MCNC: 116 MG/DL (ref 70–99)
GLUCOSE BLD-MCNC: 116 MG/DL (ref 70–99)
GLUCOSE BLD-MCNC: 117 MG/DL (ref 70–99)
GLUCOSE BLD-MCNC: 120 MG/DL (ref 70–99)
GLUCOSE BLD-MCNC: 177 MG/DL (ref 70–99)
GLUCOSE BLD-MCNC: 67 MG/DL (ref 70–99)
GLUCOSE BLD-MCNC: 92 MG/DL (ref 70–99)
HCO3 VENOUS: 26.6 MMOL/L (ref 23–29)
HCT VFR BLD CALC: 21.8 % (ref 40.5–52.5)
HCT VFR BLD CALC: 22.1 % (ref 40.5–52.5)
HCT VFR BLD CALC: 23.1 % (ref 40.5–52.5)
HCT VFR BLD CALC: 23.9 % (ref 40.5–52.5)
HCT VFR BLD CALC: 24 % (ref 40.5–52.5)
HCT VFR BLD CALC: 24.4 % (ref 40.5–52.5)
HCT VFR BLD CALC: 25.1 % (ref 40.5–52.5)
HCT VFR BLD CALC: 25.1 % (ref 40.5–52.5)
HCT VFR BLD CALC: 25.3 % (ref 40.5–52.5)
HCT VFR BLD CALC: 26.5 % (ref 40.5–52.5)
HCT VFR BLD CALC: 26.6 % (ref 40.5–52.5)
HCT VFR BLD CALC: 26.7 % (ref 40.5–52.5)
HCT VFR BLD CALC: 27.5 % (ref 40.5–52.5)
HCT VFR BLD CALC: 28 % (ref 40.5–52.5)
HCT VFR BLD CALC: 28.4 % (ref 40.5–52.5)
HCT VFR BLD CALC: 28.7 % (ref 40.5–52.5)
HCT VFR BLD CALC: 29.1 % (ref 40.5–52.5)
HCT VFR BLD CALC: 29.6 % (ref 40.5–52.5)
HCT VFR BLD CALC: 30.5 % (ref 40.5–52.5)
HCT VFR BLD CALC: 30.5 % (ref 40.5–52.5)
HCT VFR BLD CALC: 30.8 % (ref 40.5–52.5)
HCT VFR BLD CALC: 31.4 % (ref 40.5–52.5)
HCT VFR BLD CALC: 33.8 % (ref 40.5–52.5)
HEMOGLOBIN: 10.2 G/DL (ref 13.5–17.5)
HEMOGLOBIN: 10.4 G/DL (ref 13.5–17.5)
HEMOGLOBIN: 11.2 G/DL (ref 13.5–17.5)
HEMOGLOBIN: 7.1 G/DL (ref 13.5–17.5)
HEMOGLOBIN: 7.1 G/DL (ref 13.5–17.5)
HEMOGLOBIN: 7.5 G/DL (ref 13.5–17.5)
HEMOGLOBIN: 7.8 G/DL (ref 13.5–17.5)
HEMOGLOBIN: 7.8 G/DL (ref 13.5–17.5)
HEMOGLOBIN: 7.9 G/DL (ref 13.5–17.5)
HEMOGLOBIN: 7.9 G/DL (ref 13.5–17.5)
HEMOGLOBIN: 8.3 G/DL (ref 13.5–17.5)
HEMOGLOBIN: 8.4 G/DL (ref 13.5–17.5)
HEMOGLOBIN: 8.4 G/DL (ref 13.5–17.5)
HEMOGLOBIN: 8.5 G/DL (ref 13.5–17.5)
HEMOGLOBIN: 8.6 G/DL (ref 13.5–17.5)
HEMOGLOBIN: 9 G/DL (ref 13.5–17.5)
HEMOGLOBIN: 9.2 G/DL (ref 13.5–17.5)
HEMOGLOBIN: 9.2 G/DL (ref 13.5–17.5)
HEMOGLOBIN: 9.3 G/DL (ref 13.5–17.5)
HEMOGLOBIN: 9.4 G/DL (ref 13.5–17.5)
HEMOGLOBIN: 9.7 G/DL (ref 13.5–17.5)
HEMOGLOBIN: 9.7 G/DL (ref 13.5–17.5)
HEMOGLOBIN: 9.8 G/DL (ref 13.5–17.5)
INR BLD: 1.12 (ref 0.86–1.14)
IRON SATURATION: 6 % (ref 20–50)
IRON: 23 UG/DL (ref 59–158)
LACTATE: 1.39 MMOL/L (ref 0.4–2)
LYMPHOCYTES ABSOLUTE: 0.9 K/UL (ref 1–5.1)
LYMPHOCYTES ABSOLUTE: 1 K/UL (ref 1–5.1)
LYMPHOCYTES ABSOLUTE: 1 K/UL (ref 1–5.1)
LYMPHOCYTES ABSOLUTE: 1.1 K/UL (ref 1–5.1)
LYMPHOCYTES ABSOLUTE: 1.1 K/UL (ref 1–5.1)
LYMPHOCYTES RELATIVE PERCENT: 16 %
LYMPHOCYTES RELATIVE PERCENT: 17.9 %
LYMPHOCYTES RELATIVE PERCENT: 17.9 %
LYMPHOCYTES RELATIVE PERCENT: 19.6 %
LYMPHOCYTES RELATIVE PERCENT: 19.9 %
MAGNESIUM: 2.2 MG/DL (ref 1.8–2.4)
MAGNESIUM: 2.4 MG/DL (ref 1.8–2.4)
MAGNESIUM: 2.5 MG/DL (ref 1.8–2.4)
MAGNESIUM: 2.5 MG/DL (ref 1.8–2.4)
MCH RBC QN AUTO: 29.6 PG (ref 26–34)
MCH RBC QN AUTO: 29.7 PG (ref 26–34)
MCH RBC QN AUTO: 29.7 PG (ref 26–34)
MCH RBC QN AUTO: 30.1 PG (ref 26–34)
MCH RBC QN AUTO: 30.1 PG (ref 26–34)
MCH RBC QN AUTO: 30.2 PG (ref 26–34)
MCH RBC QN AUTO: 30.2 PG (ref 26–34)
MCH RBC QN AUTO: 30.3 PG (ref 26–34)
MCH RBC QN AUTO: 30.4 PG (ref 26–34)
MCH RBC QN AUTO: 30.5 PG (ref 26–34)
MCH RBC QN AUTO: 30.5 PG (ref 26–34)
MCH RBC QN AUTO: 30.6 PG (ref 26–34)
MCH RBC QN AUTO: 31.3 PG (ref 26–34)
MCHC RBC AUTO-ENTMCNC: 31.6 G/DL (ref 31–36)
MCHC RBC AUTO-ENTMCNC: 32 G/DL (ref 31–36)
MCHC RBC AUTO-ENTMCNC: 32.1 G/DL (ref 31–36)
MCHC RBC AUTO-ENTMCNC: 32.4 G/DL (ref 31–36)
MCHC RBC AUTO-ENTMCNC: 32.4 G/DL (ref 31–36)
MCHC RBC AUTO-ENTMCNC: 32.5 G/DL (ref 31–36)
MCHC RBC AUTO-ENTMCNC: 32.6 G/DL (ref 31–36)
MCHC RBC AUTO-ENTMCNC: 32.6 G/DL (ref 31–36)
MCHC RBC AUTO-ENTMCNC: 32.7 G/DL (ref 31–36)
MCHC RBC AUTO-ENTMCNC: 32.7 G/DL (ref 31–36)
MCHC RBC AUTO-ENTMCNC: 32.8 G/DL (ref 31–36)
MCHC RBC AUTO-ENTMCNC: 33 G/DL (ref 31–36)
MCHC RBC AUTO-ENTMCNC: 33 G/DL (ref 31–36)
MCHC RBC AUTO-ENTMCNC: 33.1 G/DL (ref 31–36)
MCHC RBC AUTO-ENTMCNC: 33.5 G/DL (ref 31–36)
MCV RBC AUTO: 91.2 FL (ref 80–100)
MCV RBC AUTO: 91.3 FL (ref 80–100)
MCV RBC AUTO: 91.3 FL (ref 80–100)
MCV RBC AUTO: 91.5 FL (ref 80–100)
MCV RBC AUTO: 91.9 FL (ref 80–100)
MCV RBC AUTO: 92.4 FL (ref 80–100)
MCV RBC AUTO: 92.6 FL (ref 80–100)
MCV RBC AUTO: 92.8 FL (ref 80–100)
MCV RBC AUTO: 92.8 FL (ref 80–100)
MCV RBC AUTO: 93.2 FL (ref 80–100)
MCV RBC AUTO: 93.4 FL (ref 80–100)
MCV RBC AUTO: 93.4 FL (ref 80–100)
MCV RBC AUTO: 93.5 FL (ref 80–100)
MCV RBC AUTO: 93.6 FL (ref 80–100)
MCV RBC AUTO: 94.3 FL (ref 80–100)
MCV RBC AUTO: 95.1 FL (ref 80–100)
MCV RBC AUTO: 95.4 FL (ref 80–100)
MONOCYTES ABSOLUTE: 0.6 K/UL (ref 0–1.3)
MONOCYTES ABSOLUTE: 0.6 K/UL (ref 0–1.3)
MONOCYTES ABSOLUTE: 0.8 K/UL (ref 0–1.3)
MONOCYTES RELATIVE PERCENT: 10.9 %
MONOCYTES RELATIVE PERCENT: 11.9 %
MONOCYTES RELATIVE PERCENT: 13.4 %
MONOCYTES RELATIVE PERCENT: 13.8 %
MONOCYTES RELATIVE PERCENT: 15 %
NEUTROPHILS ABSOLUTE: 3.3 K/UL (ref 1.7–7.7)
NEUTROPHILS ABSOLUTE: 3.4 K/UL (ref 1.7–7.7)
NEUTROPHILS ABSOLUTE: 3.6 K/UL (ref 1.7–7.7)
NEUTROPHILS ABSOLUTE: 3.6 K/UL (ref 1.7–7.7)
NEUTROPHILS ABSOLUTE: 3.7 K/UL (ref 1.7–7.7)
NEUTROPHILS RELATIVE PERCENT: 60.9 %
NEUTROPHILS RELATIVE PERCENT: 60.9 %
NEUTROPHILS RELATIVE PERCENT: 62.6 %
NEUTROPHILS RELATIVE PERCENT: 67.6 %
NEUTROPHILS RELATIVE PERCENT: 69.2 %
O2 SAT, VEN: 41 %
OCCULT BLOOD DIAGNOSTIC: ABNORMAL
PCO2, VEN: 38.8 MM HG (ref 40–50)
PDW BLD-RTO: 16.2 % (ref 12.4–15.4)
PDW BLD-RTO: 16.5 % (ref 12.4–15.4)
PDW BLD-RTO: 16.5 % (ref 12.4–15.4)
PDW BLD-RTO: 16.7 % (ref 12.4–15.4)
PDW BLD-RTO: 16.7 % (ref 12.4–15.4)
PDW BLD-RTO: 16.8 % (ref 12.4–15.4)
PDW BLD-RTO: 17 % (ref 12.4–15.4)
PDW BLD-RTO: 17.2 % (ref 12.4–15.4)
PDW BLD-RTO: 17.4 % (ref 12.4–15.4)
PDW BLD-RTO: 17.5 % (ref 12.4–15.4)
PDW BLD-RTO: 17.6 % (ref 12.4–15.4)
PDW BLD-RTO: 17.7 % (ref 12.4–15.4)
PDW BLD-RTO: 17.7 % (ref 12.4–15.4)
PDW BLD-RTO: 17.8 % (ref 12.4–15.4)
PDW BLD-RTO: 18 % (ref 12.4–15.4)
PDW BLD-RTO: 18.4 % (ref 12.4–15.4)
PDW BLD-RTO: 18.6 % (ref 12.4–15.4)
PERFORMED ON: ABNORMAL
PH VENOUS: 7.44 (ref 7.35–7.45)
PHOSPHORUS: 2.4 MG/DL (ref 2.5–4.9)
PLATELET # BLD: 146 K/UL (ref 135–450)
PLATELET # BLD: 150 K/UL (ref 135–450)
PLATELET # BLD: 153 K/UL (ref 135–450)
PLATELET # BLD: 156 K/UL (ref 135–450)
PLATELET # BLD: 156 K/UL (ref 135–450)
PLATELET # BLD: 159 K/UL (ref 135–450)
PLATELET # BLD: 161 K/UL (ref 135–450)
PLATELET # BLD: 165 K/UL (ref 135–450)
PLATELET # BLD: 165 K/UL (ref 135–450)
PLATELET # BLD: 172 K/UL (ref 135–450)
PLATELET # BLD: 178 K/UL (ref 135–450)
PLATELET # BLD: 199 K/UL (ref 135–450)
PLATELET # BLD: 200 K/UL (ref 135–450)
PLATELET # BLD: 201 K/UL (ref 135–450)
PLATELET # BLD: 209 K/UL (ref 135–450)
PLATELET # BLD: 222 K/UL (ref 135–450)
PLATELET # BLD: 224 K/UL (ref 135–450)
PMV BLD AUTO: 7.2 FL (ref 5–10.5)
PMV BLD AUTO: 7.3 FL (ref 5–10.5)
PMV BLD AUTO: 7.4 FL (ref 5–10.5)
PMV BLD AUTO: 7.5 FL (ref 5–10.5)
PMV BLD AUTO: 7.6 FL (ref 5–10.5)
PMV BLD AUTO: 7.8 FL (ref 5–10.5)
PMV BLD AUTO: 7.9 FL (ref 5–10.5)
PO2, VEN: 23 MM HG
POC SAMPLE TYPE: ABNORMAL
POTASSIUM REFLEX MAGNESIUM: 3.4 MMOL/L (ref 3.5–5.1)
POTASSIUM REFLEX MAGNESIUM: 3.4 MMOL/L (ref 3.5–5.1)
POTASSIUM REFLEX MAGNESIUM: 3.6 MMOL/L (ref 3.5–5.1)
POTASSIUM REFLEX MAGNESIUM: 3.7 MMOL/L (ref 3.5–5.1)
POTASSIUM REFLEX MAGNESIUM: 3.7 MMOL/L (ref 3.5–5.1)
POTASSIUM REFLEX MAGNESIUM: 4.2 MMOL/L (ref 3.5–5.1)
POTASSIUM REFLEX MAGNESIUM: 4.2 MMOL/L (ref 3.5–5.1)
POTASSIUM REFLEX MAGNESIUM: 4.3 MMOL/L (ref 3.5–5.1)
POTASSIUM REFLEX MAGNESIUM: 4.5 MMOL/L (ref 3.5–5.1)
POTASSIUM REFLEX MAGNESIUM: 5.4 MMOL/L (ref 3.5–5.1)
POTASSIUM SERPL-SCNC: 4 MMOL/L (ref 3.5–5.1)
POTASSIUM SERPL-SCNC: 4.3 MMOL/L (ref 3.5–5.1)
POTASSIUM SERPL-SCNC: 4.4 MMOL/L (ref 3.5–5.1)
POTASSIUM SERPL-SCNC: 4.8 MMOL/L (ref 3.5–5.1)
PRO-BNP: ABNORMAL PG/ML (ref 0–449)
PROTHROMBIN TIME: 12.8 SEC (ref 9.8–13)
RBC # BLD: 2.34 M/UL (ref 4.2–5.9)
RBC # BLD: 2.53 M/UL (ref 4.2–5.9)
RBC # BLD: 2.55 M/UL (ref 4.2–5.9)
RBC # BLD: 2.58 M/UL (ref 4.2–5.9)
RBC # BLD: 2.74 M/UL (ref 4.2–5.9)
RBC # BLD: 2.8 M/UL (ref 4.2–5.9)
RBC # BLD: 2.91 M/UL (ref 4.2–5.9)
RBC # BLD: 2.98 M/UL (ref 4.2–5.9)
RBC # BLD: 3 M/UL (ref 4.2–5.9)
RBC # BLD: 3.11 M/UL (ref 4.2–5.9)
RBC # BLD: 3.11 M/UL (ref 4.2–5.9)
RBC # BLD: 3.18 M/UL (ref 4.2–5.9)
RBC # BLD: 3.23 M/UL (ref 4.2–5.9)
RBC # BLD: 3.24 M/UL (ref 4.2–5.9)
RBC # BLD: 3.26 M/UL (ref 4.2–5.9)
RBC # BLD: 3.41 M/UL (ref 4.2–5.9)
RBC # BLD: 3.66 M/UL (ref 4.2–5.9)
SODIUM BLD-SCNC: 136 MMOL/L (ref 136–145)
SODIUM BLD-SCNC: 137 MMOL/L (ref 136–145)
SODIUM BLD-SCNC: 139 MMOL/L (ref 136–145)
SODIUM BLD-SCNC: 139 MMOL/L (ref 136–145)
SODIUM BLD-SCNC: 140 MMOL/L (ref 136–145)
SODIUM BLD-SCNC: 143 MMOL/L (ref 136–145)
SODIUM BLD-SCNC: 144 MMOL/L (ref 136–145)
SODIUM BLD-SCNC: 144 MMOL/L (ref 136–145)
SODIUM BLD-SCNC: 147 MMOL/L (ref 136–145)
SODIUM BLD-SCNC: 148 MMOL/L (ref 136–145)
SODIUM BLD-SCNC: 148 MMOL/L (ref 136–145)
SODIUM BLD-SCNC: 149 MMOL/L (ref 136–145)
SODIUM BLD-SCNC: 149 MMOL/L (ref 136–145)
SODIUM BLD-SCNC: 150 MMOL/L (ref 136–145)
TCO2 CALC VENOUS: 28 MMOL/L
TOTAL IRON BINDING CAPACITY: 384 UG/DL (ref 260–445)
TOTAL PROTEIN: 6.6 G/DL (ref 6.4–8.2)
TRANSFERRIN: 334 MG/DL (ref 200–360)
TROPONIN: 0.03 NG/ML
TROPONIN: 0.04 NG/ML
WBC # BLD: 3.9 K/UL (ref 4–11)
WBC # BLD: 4.6 K/UL (ref 4–11)
WBC # BLD: 5.1 K/UL (ref 4–11)
WBC # BLD: 5.3 K/UL (ref 4–11)
WBC # BLD: 5.4 K/UL (ref 4–11)
WBC # BLD: 5.5 K/UL (ref 4–11)
WBC # BLD: 5.6 K/UL (ref 4–11)
WBC # BLD: 5.8 K/UL (ref 4–11)
WBC # BLD: 6 K/UL (ref 4–11)
WBC # BLD: 6 K/UL (ref 4–11)
WBC # BLD: 6.1 K/UL (ref 4–11)
WBC # BLD: 7.2 K/UL (ref 4–11)
WBC # BLD: 7.7 K/UL (ref 4–11)
WBC # BLD: 9.6 K/UL (ref 4–11)

## 2019-01-01 PROCEDURE — 6370000000 HC RX 637 (ALT 250 FOR IP): Performed by: STUDENT IN AN ORGANIZED HEALTH CARE EDUCATION/TRAINING PROGRAM

## 2019-01-01 PROCEDURE — 6360000002 HC RX W HCPCS: Performed by: INTERNAL MEDICINE

## 2019-01-01 PROCEDURE — 99214 OFFICE O/P EST MOD 30 MIN: CPT | Performed by: INTERNAL MEDICINE

## 2019-01-01 PROCEDURE — 97530 THERAPEUTIC ACTIVITIES: CPT

## 2019-01-01 PROCEDURE — 85730 THROMBOPLASTIN TIME PARTIAL: CPT

## 2019-01-01 PROCEDURE — 85025 COMPLETE CBC W/AUTO DIFF WBC: CPT

## 2019-01-01 PROCEDURE — 85027 COMPLETE CBC AUTOMATED: CPT

## 2019-01-01 PROCEDURE — 97116 GAIT TRAINING THERAPY: CPT

## 2019-01-01 PROCEDURE — 7100000010 HC PHASE II RECOVERY - FIRST 15 MIN: Performed by: INTERNAL MEDICINE

## 2019-01-01 PROCEDURE — 3609009900 HC COLONOSCOPY W/CONTROL BLEEDING ANY METHOD: Performed by: INTERNAL MEDICINE

## 2019-01-01 PROCEDURE — 2500000003 HC RX 250 WO HCPCS: Performed by: PHYSICIAN ASSISTANT

## 2019-01-01 PROCEDURE — 99232 SBSQ HOSP IP/OBS MODERATE 35: CPT | Performed by: HOSPITALIST

## 2019-01-01 PROCEDURE — 6370000000 HC RX 637 (ALT 250 FOR IP): Performed by: PHYSICIAN ASSISTANT

## 2019-01-01 PROCEDURE — G8598 ASA/ANTIPLAT THER USED: HCPCS | Performed by: NURSE PRACTITIONER

## 2019-01-01 PROCEDURE — 6370000000 HC RX 637 (ALT 250 FOR IP): Performed by: INTERNAL MEDICINE

## 2019-01-01 PROCEDURE — 1111F DSCHRG MED/CURRENT MED MERGE: CPT | Performed by: NURSE PRACTITIONER

## 2019-01-01 PROCEDURE — 82728 ASSAY OF FERRITIN: CPT

## 2019-01-01 PROCEDURE — 36415 COLL VENOUS BLD VENIPUNCTURE: CPT

## 2019-01-01 PROCEDURE — 2580000003 HC RX 258: Performed by: INTERNAL MEDICINE

## 2019-01-01 PROCEDURE — 92526 ORAL FUNCTION THERAPY: CPT

## 2019-01-01 PROCEDURE — 97535 SELF CARE MNGMENT TRAINING: CPT

## 2019-01-01 PROCEDURE — P9016 RBC LEUKOCYTES REDUCED: HCPCS

## 2019-01-01 PROCEDURE — 6370000000 HC RX 637 (ALT 250 FOR IP): Performed by: HOSPITALIST

## 2019-01-01 PROCEDURE — 97165 OT EVAL LOW COMPLEX 30 MIN: CPT

## 2019-01-01 PROCEDURE — 80048 BASIC METABOLIC PNL TOTAL CA: CPT

## 2019-01-01 PROCEDURE — 92610 EVALUATE SWALLOWING FUNCTION: CPT

## 2019-01-01 PROCEDURE — G8420 CALC BMI NORM PARAMETERS: HCPCS | Performed by: NURSE PRACTITIONER

## 2019-01-01 PROCEDURE — 83880 ASSAY OF NATRIURETIC PEPTIDE: CPT

## 2019-01-01 PROCEDURE — 99239 HOSP IP/OBS DSCHRG MGMT >30: CPT | Performed by: HOSPITALIST

## 2019-01-01 PROCEDURE — 99222 1ST HOSP IP/OBS MODERATE 55: CPT | Performed by: HOSPITALIST

## 2019-01-01 PROCEDURE — 6360000002 HC RX W HCPCS: Performed by: STUDENT IN AN ORGANIZED HEALTH CARE EDUCATION/TRAINING PROGRAM

## 2019-01-01 PROCEDURE — 1200000000 HC SEMI PRIVATE

## 2019-01-01 PROCEDURE — 1036F TOBACCO NON-USER: CPT | Performed by: NURSE PRACTITIONER

## 2019-01-01 PROCEDURE — 99291 CRITICAL CARE FIRST HOUR: CPT

## 2019-01-01 PROCEDURE — 99214 OFFICE O/P EST MOD 30 MIN: CPT | Performed by: NURSE PRACTITIONER

## 2019-01-01 PROCEDURE — 86923 COMPATIBILITY TEST ELECTRIC: CPT

## 2019-01-01 PROCEDURE — 6360000002 HC RX W HCPCS: Performed by: NURSE ANESTHETIST, CERTIFIED REGISTERED

## 2019-01-01 PROCEDURE — G8598 ASA/ANTIPLAT THER USED: HCPCS | Performed by: INTERNAL MEDICINE

## 2019-01-01 PROCEDURE — 36430 TRANSFUSION BLD/BLD COMPNT: CPT

## 2019-01-01 PROCEDURE — 2580000003 HC RX 258: Performed by: STUDENT IN AN ORGANIZED HEALTH CARE EDUCATION/TRAINING PROGRAM

## 2019-01-01 PROCEDURE — 99232 SBSQ HOSP IP/OBS MODERATE 35: CPT | Performed by: INTERNAL MEDICINE

## 2019-01-01 PROCEDURE — 99238 HOSP IP/OBS DSCHRG MGMT 30/<: CPT | Performed by: HOSPITALIST

## 2019-01-01 PROCEDURE — 3700000001 HC ADD 15 MINUTES (ANESTHESIA): Performed by: INTERNAL MEDICINE

## 2019-01-01 PROCEDURE — 2580000003 HC RX 258: Performed by: NURSE ANESTHETIST, CERTIFIED REGISTERED

## 2019-01-01 PROCEDURE — 71045 X-RAY EXAM CHEST 1 VIEW: CPT

## 2019-01-01 PROCEDURE — 2720000010 HC SURG SUPPLY STERILE: Performed by: INTERNAL MEDICINE

## 2019-01-01 PROCEDURE — G8420 CALC BMI NORM PARAMETERS: HCPCS | Performed by: INTERNAL MEDICINE

## 2019-01-01 PROCEDURE — 86900 BLOOD TYPING SEROLOGIC ABO: CPT

## 2019-01-01 PROCEDURE — 85018 HEMOGLOBIN: CPT

## 2019-01-01 PROCEDURE — G8427 DOCREV CUR MEDS BY ELIG CLIN: HCPCS | Performed by: INTERNAL MEDICINE

## 2019-01-01 PROCEDURE — 85014 HEMATOCRIT: CPT

## 2019-01-01 PROCEDURE — 2580000003 HC RX 258: Performed by: HOSPITALIST

## 2019-01-01 PROCEDURE — G0328 FECAL BLOOD SCRN IMMUNOASSAY: HCPCS

## 2019-01-01 PROCEDURE — 86850 RBC ANTIBODY SCREEN: CPT

## 2019-01-01 PROCEDURE — 2060000000 HC ICU INTERMEDIATE R&B

## 2019-01-01 PROCEDURE — 93005 ELECTROCARDIOGRAM TRACING: CPT | Performed by: INTERNAL MEDICINE

## 2019-01-01 PROCEDURE — 83735 ASSAY OF MAGNESIUM: CPT

## 2019-01-01 PROCEDURE — 4040F PNEUMOC VAC/ADMIN/RCVD: CPT | Performed by: INTERNAL MEDICINE

## 2019-01-01 PROCEDURE — 7100000011 HC PHASE II RECOVERY - ADDTL 15 MIN: Performed by: INTERNAL MEDICINE

## 2019-01-01 PROCEDURE — 4040F PNEUMOC VAC/ADMIN/RCVD: CPT | Performed by: NURSE PRACTITIONER

## 2019-01-01 PROCEDURE — 72125 CT NECK SPINE W/O DYE: CPT

## 2019-01-01 PROCEDURE — 0DJ08ZZ INSPECTION OF UPPER INTESTINAL TRACT, VIA NATURAL OR ARTIFICIAL OPENING ENDOSCOPIC: ICD-10-PCS | Performed by: INTERNAL MEDICINE

## 2019-01-01 PROCEDURE — 93005 ELECTROCARDIOGRAM TRACING: CPT | Performed by: HOSPITALIST

## 2019-01-01 PROCEDURE — 1123F ACP DISCUSS/DSCN MKR DOCD: CPT | Performed by: NURSE PRACTITIONER

## 2019-01-01 PROCEDURE — 92611 MOTION FLUOROSCOPY/SWALLOW: CPT

## 2019-01-01 PROCEDURE — 86901 BLOOD TYPING SEROLOGIC RH(D): CPT

## 2019-01-01 PROCEDURE — 70450 CT HEAD/BRAIN W/O DYE: CPT

## 2019-01-01 PROCEDURE — 97161 PT EVAL LOW COMPLEX 20 MIN: CPT

## 2019-01-01 PROCEDURE — 93010 ELECTROCARDIOGRAM REPORT: CPT | Performed by: INTERNAL MEDICINE

## 2019-01-01 PROCEDURE — 80053 COMPREHEN METABOLIC PANEL: CPT

## 2019-01-01 PROCEDURE — 82803 BLOOD GASES ANY COMBINATION: CPT

## 2019-01-01 PROCEDURE — 99153 MOD SED SAME PHYS/QHP EA: CPT | Performed by: INTERNAL MEDICINE

## 2019-01-01 PROCEDURE — 93283 PRGRMG EVAL IMPLANTABLE DFB: CPT | Performed by: INTERNAL MEDICINE

## 2019-01-01 PROCEDURE — 1123F ACP DISCUSS/DSCN MKR DOCD: CPT | Performed by: INTERNAL MEDICINE

## 2019-01-01 PROCEDURE — 93005 ELECTROCARDIOGRAM TRACING: CPT | Performed by: EMERGENCY MEDICINE

## 2019-01-01 PROCEDURE — 85610 PROTHROMBIN TIME: CPT

## 2019-01-01 PROCEDURE — 71046 X-RAY EXAM CHEST 2 VIEWS: CPT

## 2019-01-01 PROCEDURE — 84484 ASSAY OF TROPONIN QUANT: CPT

## 2019-01-01 PROCEDURE — 3609017100 HC EGD: Performed by: INTERNAL MEDICINE

## 2019-01-01 PROCEDURE — 99152 MOD SED SAME PHYS/QHP 5/>YRS: CPT | Performed by: INTERNAL MEDICINE

## 2019-01-01 PROCEDURE — 84466 ASSAY OF TRANSFERRIN: CPT

## 2019-01-01 PROCEDURE — 93290 INTERROG DEV EVAL ICPMS IP: CPT | Performed by: INTERNAL MEDICINE

## 2019-01-01 PROCEDURE — 3700000000 HC ANESTHESIA ATTENDED CARE: Performed by: INTERNAL MEDICINE

## 2019-01-01 PROCEDURE — 83605 ASSAY OF LACTIC ACID: CPT

## 2019-01-01 PROCEDURE — G8427 DOCREV CUR MEDS BY ELIG CLIN: HCPCS | Performed by: NURSE PRACTITIONER

## 2019-01-01 PROCEDURE — 80069 RENAL FUNCTION PANEL: CPT

## 2019-01-01 PROCEDURE — 99285 EMERGENCY DEPT VISIT HI MDM: CPT

## 2019-01-01 PROCEDURE — 74230 X-RAY XM SWLNG FUNCJ C+: CPT

## 2019-01-01 PROCEDURE — 2780000010 HC IMPLANT OTHER: Performed by: INTERNAL MEDICINE

## 2019-01-01 PROCEDURE — 0DJD8ZZ INSPECTION OF LOWER INTESTINAL TRACT, VIA NATURAL OR ARTIFICIAL OPENING ENDOSCOPIC: ICD-10-PCS | Performed by: INTERNAL MEDICINE

## 2019-01-01 PROCEDURE — 83540 ASSAY OF IRON: CPT

## 2019-01-01 PROCEDURE — 4500000025 HC ED LEVEL 5 PROCEDURE

## 2019-01-01 PROCEDURE — 1036F TOBACCO NON-USER: CPT | Performed by: INTERNAL MEDICINE

## 2019-01-01 DEVICE — CLIP LIG L235CM RESOL 360 BX/20: Type: IMPLANTABLE DEVICE | Site: ABDOMEN | Status: FUNCTIONAL

## 2019-01-01 RX ORDER — ATORVASTATIN CALCIUM 40 MG/1
40 TABLET, FILM COATED ORAL EVERY MORNING
Qty: 90 TABLET | Refills: 0 | Status: SHIPPED | OUTPATIENT
Start: 2019-01-01 | End: 2019-01-01 | Stop reason: SDUPTHER

## 2019-01-01 RX ORDER — FUROSEMIDE 40 MG/1
40 TABLET ORAL DAILY
Status: DISCONTINUED | OUTPATIENT
Start: 2019-01-01 | End: 2019-01-01

## 2019-01-01 RX ORDER — ONDANSETRON 2 MG/ML
4 INJECTION INTRAMUSCULAR; INTRAVENOUS EVERY 6 HOURS PRN
Status: CANCELLED | OUTPATIENT
Start: 2019-01-01

## 2019-01-01 RX ORDER — SODIUM CHLORIDE 0.9 % (FLUSH) 0.9 %
10 SYRINGE (ML) INJECTION EVERY 12 HOURS SCHEDULED
Status: CANCELLED | OUTPATIENT
Start: 2019-01-01

## 2019-01-01 RX ORDER — UREA 10 %
5 LOTION (ML) TOPICAL NIGHTLY PRN
Status: DISCONTINUED | OUTPATIENT
Start: 2019-01-01 | End: 2019-01-01 | Stop reason: HOSPADM

## 2019-01-01 RX ORDER — SODIUM CHLORIDE, SODIUM LACTATE, POTASSIUM CHLORIDE, CALCIUM CHLORIDE 600; 310; 30; 20 MG/100ML; MG/100ML; MG/100ML; MG/100ML
INJECTION, SOLUTION INTRAVENOUS CONTINUOUS PRN
Status: DISCONTINUED | OUTPATIENT
Start: 2019-01-01 | End: 2019-01-01 | Stop reason: SDUPTHER

## 2019-01-01 RX ORDER — LIDOCAINE HYDROCHLORIDE AND EPINEPHRINE 10; 10 MG/ML; UG/ML
20 INJECTION, SOLUTION INFILTRATION; PERINEURAL ONCE
Status: COMPLETED | OUTPATIENT
Start: 2019-01-01 | End: 2019-01-01

## 2019-01-01 RX ORDER — SODIUM CHLORIDE 0.9 % (FLUSH) 0.9 %
10 SYRINGE (ML) INJECTION PRN
Status: DISCONTINUED | OUTPATIENT
Start: 2019-01-01 | End: 2019-01-01 | Stop reason: HOSPADM

## 2019-01-01 RX ORDER — SODIUM CHLORIDE 450 MG/100ML
INJECTION, SOLUTION INTRAVENOUS CONTINUOUS
Status: DISCONTINUED | OUTPATIENT
Start: 2019-01-01 | End: 2019-01-01

## 2019-01-01 RX ORDER — M-VIT,TX,IRON,MINS/CALC/FOLIC 27MG-0.4MG
1 TABLET ORAL DAILY
Status: DISCONTINUED | OUTPATIENT
Start: 2019-01-01 | End: 2019-01-01 | Stop reason: HOSPADM

## 2019-01-01 RX ORDER — CLOPIDOGREL BISULFATE 75 MG/1
75 TABLET ORAL DAILY
Status: DISCONTINUED | OUTPATIENT
Start: 2019-01-01 | End: 2019-01-01

## 2019-01-01 RX ORDER — HEPARIN SODIUM 5000 [USP'U]/ML
5000 INJECTION, SOLUTION INTRAVENOUS; SUBCUTANEOUS EVERY 8 HOURS SCHEDULED
Status: DISCONTINUED | OUTPATIENT
Start: 2019-01-01 | End: 2019-01-01

## 2019-01-01 RX ORDER — SODIUM CHLORIDE 0.9 % (FLUSH) 0.9 %
10 SYRINGE (ML) INJECTION EVERY 12 HOURS SCHEDULED
Status: DISCONTINUED | OUTPATIENT
Start: 2019-01-01 | End: 2019-01-01 | Stop reason: HOSPADM

## 2019-01-01 RX ORDER — POLYETHYLENE GLYCOL 3350 17 G/17G
17 POWDER, FOR SOLUTION ORAL DAILY PRN
Status: DISCONTINUED | OUTPATIENT
Start: 2019-01-01 | End: 2019-01-01 | Stop reason: HOSPADM

## 2019-01-01 RX ORDER — PSEUDOEPHEDRINE HCL 30 MG
100 TABLET ORAL DAILY
Qty: 30 CAPSULE | Refills: 1 | Status: SHIPPED | OUTPATIENT
Start: 2019-01-01

## 2019-01-01 RX ORDER — DOCUSATE SODIUM 100 MG/1
100 CAPSULE, LIQUID FILLED ORAL DAILY
Status: DISCONTINUED | OUTPATIENT
Start: 2019-01-01 | End: 2019-01-01 | Stop reason: HOSPADM

## 2019-01-01 RX ORDER — ATORVASTATIN CALCIUM 40 MG/1
40 TABLET, FILM COATED ORAL EVERY MORNING
Status: DISCONTINUED | OUTPATIENT
Start: 2019-01-01 | End: 2019-01-01 | Stop reason: HOSPADM

## 2019-01-01 RX ORDER — 0.9 % SODIUM CHLORIDE 0.9 %
250 INTRAVENOUS SOLUTION INTRAVENOUS ONCE
Status: COMPLETED | OUTPATIENT
Start: 2019-01-01 | End: 2019-01-01

## 2019-01-01 RX ORDER — ASPIRIN 81 MG/1
81 TABLET ORAL DAILY
Status: DISCONTINUED | OUTPATIENT
Start: 2019-01-01 | End: 2019-01-01

## 2019-01-01 RX ORDER — ALLOPURINOL 100 MG/1
100 TABLET ORAL DAILY
Status: DISCONTINUED | OUTPATIENT
Start: 2019-01-01 | End: 2019-01-01 | Stop reason: HOSPADM

## 2019-01-01 RX ORDER — FUROSEMIDE 80 MG
80 TABLET ORAL 2 TIMES DAILY
Qty: 60 TABLET | Refills: 0 | Status: SHIPPED | OUTPATIENT
Start: 2019-01-01 | End: 2019-01-01

## 2019-01-01 RX ORDER — MULTIVIT-MIN/FA/LYCOPEN/LUTEIN .4-300-25
TABLET ORAL
Qty: 90 TABLET | Refills: 1 | Status: SHIPPED | OUTPATIENT
Start: 2019-01-01

## 2019-01-01 RX ORDER — FAMOTIDINE 20 MG/1
20 TABLET, FILM COATED ORAL DAILY
Status: DISCONTINUED | OUTPATIENT
Start: 2019-01-01 | End: 2019-01-01

## 2019-01-01 RX ORDER — ALPRAZOLAM 0.25 MG/1
0.25 TABLET ORAL DAILY PRN
Qty: 30 TABLET | Refills: 1 | OUTPATIENT
Start: 2019-01-01 | End: 2019-01-01 | Stop reason: SDUPTHER

## 2019-01-01 RX ORDER — HYDRALAZINE HYDROCHLORIDE 20 MG/ML
5 INJECTION INTRAMUSCULAR; INTRAVENOUS EVERY 6 HOURS PRN
Status: DISCONTINUED | OUTPATIENT
Start: 2019-01-01 | End: 2019-01-01 | Stop reason: HOSPADM

## 2019-01-01 RX ORDER — BACITRACIN ZINC AND POLYMYXIN B SULFATE 500; 1000 [USP'U]/G; [USP'U]/G
OINTMENT TOPICAL ONCE
Status: COMPLETED | OUTPATIENT
Start: 2019-01-01 | End: 2019-01-01

## 2019-01-01 RX ORDER — ACETAMINOPHEN 325 MG/1
650 TABLET ORAL EVERY 6 HOURS PRN
Status: DISCONTINUED | OUTPATIENT
Start: 2019-01-01 | End: 2019-01-01 | Stop reason: HOSPADM

## 2019-01-01 RX ORDER — FUROSEMIDE 10 MG/ML
40 INJECTION INTRAMUSCULAR; INTRAVENOUS ONCE
Status: COMPLETED | OUTPATIENT
Start: 2019-01-01 | End: 2019-01-01

## 2019-01-01 RX ORDER — POTASSIUM CHLORIDE 20 MEQ/1
40 TABLET, EXTENDED RELEASE ORAL ONCE
Status: DISCONTINUED | OUTPATIENT
Start: 2019-01-01 | End: 2019-01-01

## 2019-01-01 RX ORDER — METOPROLOL SUCCINATE 100 MG/1
100 TABLET, EXTENDED RELEASE ORAL DAILY
Qty: 90 TABLET | Refills: 3 | Status: SHIPPED | OUTPATIENT
Start: 2019-01-01 | End: 2020-01-01

## 2019-01-01 RX ORDER — METOPROLOL SUCCINATE 100 MG/1
100 TABLET, EXTENDED RELEASE ORAL DAILY
Status: DISCONTINUED | OUTPATIENT
Start: 2019-01-01 | End: 2019-01-01 | Stop reason: HOSPADM

## 2019-01-01 RX ORDER — POTASSIUM CHLORIDE 20 MEQ/1
40 TABLET, EXTENDED RELEASE ORAL ONCE
Status: COMPLETED | OUTPATIENT
Start: 2019-01-01 | End: 2019-01-01

## 2019-01-01 RX ORDER — ONDANSETRON 2 MG/ML
4 INJECTION INTRAMUSCULAR; INTRAVENOUS EVERY 6 HOURS PRN
Status: DISCONTINUED | OUTPATIENT
Start: 2019-01-01 | End: 2019-01-01 | Stop reason: HOSPADM

## 2019-01-01 RX ORDER — LEVOTHYROXINE SODIUM 0.1 MG/1
100 TABLET ORAL DAILY
Qty: 90 TABLET | Refills: 0 | Status: SHIPPED | OUTPATIENT
Start: 2019-01-01 | End: 2020-01-01

## 2019-01-01 RX ORDER — ESCITALOPRAM OXALATE 5 MG/1
TABLET ORAL
Qty: 90 TABLET | Refills: 1 | Status: SHIPPED | OUTPATIENT
Start: 2019-01-01 | End: 2020-01-01

## 2019-01-01 RX ORDER — ESCITALOPRAM OXALATE 10 MG/1
5 TABLET ORAL DAILY
Status: DISCONTINUED | OUTPATIENT
Start: 2019-01-01 | End: 2019-01-01 | Stop reason: HOSPADM

## 2019-01-01 RX ORDER — FUROSEMIDE 40 MG/1
40 TABLET ORAL 2 TIMES DAILY
Status: DISCONTINUED | OUTPATIENT
Start: 2019-01-01 | End: 2019-01-01

## 2019-01-01 RX ORDER — FERROUS SULFATE 325(65) MG
325 TABLET ORAL
Qty: 30 TABLET | Refills: 5 | Status: SHIPPED | OUTPATIENT
Start: 2019-01-01

## 2019-01-01 RX ORDER — ATORVASTATIN CALCIUM 40 MG/1
40 TABLET, FILM COATED ORAL EVERY MORNING
Qty: 90 TABLET | Refills: 1 | Status: SHIPPED | OUTPATIENT
Start: 2019-01-01 | End: 2020-01-01

## 2019-01-01 RX ORDER — FAMOTIDINE 20 MG/1
TABLET, FILM COATED ORAL
Qty: 30 TABLET | Refills: 0 | Status: SHIPPED | OUTPATIENT
Start: 2019-01-01 | End: 2019-01-01 | Stop reason: SDUPTHER

## 2019-01-01 RX ORDER — POTASSIUM CHLORIDE 20 MEQ/1
40 TABLET, EXTENDED RELEASE ORAL 2 TIMES DAILY
Status: COMPLETED | OUTPATIENT
Start: 2019-01-01 | End: 2019-01-01

## 2019-01-01 RX ORDER — FENOFIBRATE 54 MG/1
54 TABLET ORAL DAILY
Status: DISCONTINUED | OUTPATIENT
Start: 2019-01-01 | End: 2019-01-01 | Stop reason: HOSPADM

## 2019-01-01 RX ORDER — FENTANYL CITRATE 50 UG/ML
INJECTION, SOLUTION INTRAMUSCULAR; INTRAVENOUS PRN
Status: DISCONTINUED | OUTPATIENT
Start: 2019-01-01 | End: 2019-01-01 | Stop reason: ALTCHOICE

## 2019-01-01 RX ORDER — POLYETHYLENE GLYCOL 3350 17 G/17G
17 POWDER, FOR SOLUTION ORAL DAILY PRN
Qty: 527 G | Refills: 1 | Status: SHIPPED | OUTPATIENT
Start: 2019-01-01 | End: 2019-01-01

## 2019-01-01 RX ORDER — ALPRAZOLAM 0.25 MG/1
0.25 TABLET ORAL DAILY PRN
Qty: 30 TABLET | Refills: 1 | Status: SHIPPED | OUTPATIENT
Start: 2019-01-01 | End: 2019-01-01 | Stop reason: SDUPTHER

## 2019-01-01 RX ORDER — ALPRAZOLAM 0.25 MG/1
0.25 TABLET ORAL DAILY PRN
Status: DISCONTINUED | OUTPATIENT
Start: 2019-01-01 | End: 2019-01-01 | Stop reason: HOSPADM

## 2019-01-01 RX ORDER — FUROSEMIDE 40 MG/1
60 TABLET ORAL 2 TIMES DAILY
Qty: 45 TABLET | Refills: 1 | Status: SHIPPED | OUTPATIENT
Start: 2019-01-01 | End: 2019-01-01

## 2019-01-01 RX ORDER — CALCIUM CARBONATE 500(1250)
600 TABLET ORAL DAILY
Qty: 30 TABLET | Refills: 11 | Status: SHIPPED | OUTPATIENT
Start: 2019-01-01

## 2019-01-01 RX ORDER — SODIUM CHLORIDE 0.9 % (FLUSH) 0.9 %
10 SYRINGE (ML) INJECTION PRN
Status: CANCELLED | OUTPATIENT
Start: 2019-01-01

## 2019-01-01 RX ORDER — FUROSEMIDE 80 MG
40 TABLET ORAL 2 TIMES DAILY
Qty: 60 TABLET | Refills: 0 | Status: ON HOLD | OUTPATIENT
Start: 2019-01-01 | End: 2019-01-01 | Stop reason: HOSPADM

## 2019-01-01 RX ORDER — PROPOFOL 10 MG/ML
INJECTION, EMULSION INTRAVENOUS CONTINUOUS PRN
Status: DISCONTINUED | OUTPATIENT
Start: 2019-01-01 | End: 2019-01-01 | Stop reason: SDUPTHER

## 2019-01-01 RX ORDER — FENOFIBRATE 54 MG/1
TABLET ORAL
Qty: 90 TABLET | Refills: 0 | Status: SHIPPED | OUTPATIENT
Start: 2019-01-01

## 2019-01-01 RX ORDER — LEVOTHYROXINE SODIUM 0.1 MG/1
100 TABLET ORAL DAILY
Status: DISCONTINUED | OUTPATIENT
Start: 2019-01-01 | End: 2019-01-01 | Stop reason: HOSPADM

## 2019-01-01 RX ORDER — PANTOPRAZOLE SODIUM 40 MG/1
40 TABLET, DELAYED RELEASE ORAL
Status: DISCONTINUED | OUTPATIENT
Start: 2019-01-01 | End: 2019-01-01 | Stop reason: HOSPADM

## 2019-01-01 RX ORDER — LEVOTHYROXINE SODIUM 0.1 MG/1
100 TABLET ORAL DAILY
Qty: 90 TABLET | Refills: 0 | Status: SHIPPED | OUTPATIENT
Start: 2019-01-01 | End: 2019-01-01 | Stop reason: SDUPTHER

## 2019-01-01 RX ORDER — MULTIVIT-MIN/IRON/FOLIC ACID/K 18-600-40
CAPSULE ORAL
Qty: 30 CAPSULE | Refills: 2 | Status: SHIPPED | OUTPATIENT
Start: 2019-01-01 | End: 2019-01-01 | Stop reason: SDUPTHER

## 2019-01-01 RX ORDER — ACETAMINOPHEN 160 MG
TABLET,DISINTEGRATING ORAL
Qty: 90 CAPSULE | Refills: 2 | Status: SHIPPED | OUTPATIENT
Start: 2019-01-01

## 2019-01-01 RX ORDER — FLUTICASONE PROPIONATE 50 MCG
2 SPRAY, SUSPENSION (ML) NASAL DAILY PRN
Status: DISCONTINUED | OUTPATIENT
Start: 2019-01-01 | End: 2019-01-01 | Stop reason: HOSPADM

## 2019-01-01 RX ORDER — FAMOTIDINE 20 MG/1
TABLET, FILM COATED ORAL
Qty: 90 TABLET | Refills: 1 | Status: SHIPPED | OUTPATIENT
Start: 2019-01-01 | End: 2019-01-01

## 2019-01-01 RX ORDER — SODIUM CHLORIDE 9 MG/ML
INJECTION, SOLUTION INTRAVENOUS CONTINUOUS
Status: ACTIVE | OUTPATIENT
Start: 2019-01-01 | End: 2019-01-01

## 2019-01-01 RX ORDER — PSEUDOEPHEDRINE HCL 30 MG
100 TABLET ORAL DAILY
Qty: 30 CAPSULE | Refills: 1 | Status: SHIPPED | OUTPATIENT
Start: 2019-01-01 | End: 2019-01-01 | Stop reason: SDUPTHER

## 2019-01-01 RX ORDER — ALPRAZOLAM 0.25 MG/1
0.25 TABLET ORAL DAILY PRN
Qty: 30 TABLET | Refills: 1 | OUTPATIENT
Start: 2019-01-01 | End: 2019-01-01

## 2019-01-01 RX ORDER — 0.9 % SODIUM CHLORIDE 0.9 %
500 INTRAVENOUS SOLUTION INTRAVENOUS ONCE
Status: COMPLETED | OUTPATIENT
Start: 2019-01-01 | End: 2019-01-01

## 2019-01-01 RX ORDER — MIDAZOLAM HYDROCHLORIDE 1 MG/ML
INJECTION INTRAMUSCULAR; INTRAVENOUS PRN
Status: DISCONTINUED | OUTPATIENT
Start: 2019-01-01 | End: 2019-01-01 | Stop reason: ALTCHOICE

## 2019-01-01 RX ORDER — FUROSEMIDE 40 MG/1
40 TABLET ORAL DAILY
Status: DISCONTINUED | OUTPATIENT
Start: 2019-01-01 | End: 2019-01-01 | Stop reason: HOSPADM

## 2019-01-01 RX ORDER — HEPARIN SODIUM 5000 [USP'U]/ML
5000 INJECTION, SOLUTION INTRAVENOUS; SUBCUTANEOUS 2 TIMES DAILY
Status: DISCONTINUED | OUTPATIENT
Start: 2019-01-01 | End: 2019-01-01 | Stop reason: HOSPADM

## 2019-01-01 RX ORDER — DILTIAZEM HYDROCHLORIDE 120 MG/1
120 CAPSULE, COATED, EXTENDED RELEASE ORAL DAILY
Status: DISCONTINUED | OUTPATIENT
Start: 2019-01-01 | End: 2019-01-01 | Stop reason: HOSPADM

## 2019-01-01 RX ORDER — ASPIRIN 81 MG/1
81 TABLET, COATED ORAL DAILY
Qty: 90 TABLET | Refills: 2 | Status: SHIPPED | OUTPATIENT
Start: 2019-01-01

## 2019-01-01 RX ORDER — METOPROLOL SUCCINATE 100 MG/1
100 TABLET, EXTENDED RELEASE ORAL DAILY
Qty: 90 TABLET | Refills: 3 | Status: SHIPPED | OUTPATIENT
Start: 2019-01-01 | End: 2019-01-01 | Stop reason: SDUPTHER

## 2019-01-01 RX ADMIN — PANTOPRAZOLE SODIUM 40 MG: 40 TABLET, DELAYED RELEASE ORAL at 05:33

## 2019-01-01 RX ADMIN — FUROSEMIDE 60 MG: 40 TABLET ORAL at 18:44

## 2019-01-01 RX ADMIN — FENOFIBRATE 54 MG: 54 TABLET ORAL at 07:50

## 2019-01-01 RX ADMIN — DILTIAZEM HYDROCHLORIDE 120 MG: 120 CAPSULE, COATED, EXTENDED RELEASE ORAL at 09:27

## 2019-01-01 RX ADMIN — CHOLECALCIFEROL TAB 25 MCG (1000 UNIT) 1000 UNITS: 25 TAB at 07:50

## 2019-01-01 RX ADMIN — METOPROLOL SUCCINATE 100 MG: 100 TABLET, EXTENDED RELEASE ORAL at 08:18

## 2019-01-01 RX ADMIN — HEPARIN SODIUM 5000 UNITS: 5000 INJECTION INTRAVENOUS; SUBCUTANEOUS at 15:02

## 2019-01-01 RX ADMIN — ALLOPURINOL 100 MG: 100 TABLET ORAL at 09:39

## 2019-01-01 RX ADMIN — MULTIPLE VITAMINS W/ MINERALS TAB 1 TABLET: TAB at 09:30

## 2019-01-01 RX ADMIN — POTASSIUM CHLORIDE 40 MEQ: 20 TABLET, EXTENDED RELEASE ORAL at 12:53

## 2019-01-01 RX ADMIN — HEPARIN SODIUM 5000 UNITS: 5000 INJECTION INTRAVENOUS; SUBCUTANEOUS at 20:36

## 2019-01-01 RX ADMIN — MULTIPLE VITAMINS W/ MINERALS TAB 1 TABLET: TAB at 10:55

## 2019-01-01 RX ADMIN — ATORVASTATIN CALCIUM 40 MG: 40 TABLET, FILM COATED ORAL at 09:40

## 2019-01-01 RX ADMIN — MULTIPLE VITAMINS W/ MINERALS TAB 1 TABLET: TAB at 10:26

## 2019-01-01 RX ADMIN — DOCUSATE SODIUM 100 MG: 100 CAPSULE, LIQUID FILLED ORAL at 08:08

## 2019-01-01 RX ADMIN — METOPROLOL SUCCINATE 100 MG: 100 TABLET, EXTENDED RELEASE ORAL at 09:29

## 2019-01-01 RX ADMIN — ESCITALOPRAM OXALATE 5 MG: 10 TABLET ORAL at 09:29

## 2019-01-01 RX ADMIN — ALLOPURINOL 100 MG: 100 TABLET ORAL at 09:26

## 2019-01-01 RX ADMIN — SODIUM CHLORIDE 500 ML: 9 INJECTION, SOLUTION INTRAVENOUS at 15:51

## 2019-01-01 RX ADMIN — POTASSIUM CHLORIDE 40 MEQ: 20 TABLET, EXTENDED RELEASE ORAL at 08:18

## 2019-01-01 RX ADMIN — LEVOTHYROXINE SODIUM 100 MCG: 100 TABLET ORAL at 09:40

## 2019-01-01 RX ADMIN — IRON SUCROSE 200 MG: 20 INJECTION, SOLUTION INTRAVENOUS at 13:04

## 2019-01-01 RX ADMIN — Medication 10 ML: at 08:59

## 2019-01-01 RX ADMIN — DOCUSATE SODIUM 100 MG: 100 CAPSULE, LIQUID FILLED ORAL at 10:27

## 2019-01-01 RX ADMIN — ATORVASTATIN CALCIUM 40 MG: 40 TABLET, FILM COATED ORAL at 08:08

## 2019-01-01 RX ADMIN — ATORVASTATIN CALCIUM 40 MG: 40 TABLET, FILM COATED ORAL at 07:50

## 2019-01-01 RX ADMIN — FENOFIBRATE 54 MG: 54 TABLET ORAL at 08:18

## 2019-01-01 RX ADMIN — DOCUSATE SODIUM 100 MG: 100 CAPSULE, LIQUID FILLED ORAL at 08:58

## 2019-01-01 RX ADMIN — FUROSEMIDE 10 MG/HR: 10 INJECTION, SOLUTION INTRAMUSCULAR; INTRAVENOUS at 20:05

## 2019-01-01 RX ADMIN — FENOFIBRATE 54 MG: 54 TABLET ORAL at 08:58

## 2019-01-01 RX ADMIN — DILTIAZEM HYDROCHLORIDE 120 MG: 120 CAPSULE, COATED, EXTENDED RELEASE ORAL at 10:26

## 2019-01-01 RX ADMIN — PANTOPRAZOLE SODIUM 40 MG: 40 TABLET, DELAYED RELEASE ORAL at 20:42

## 2019-01-01 RX ADMIN — ESCITALOPRAM OXALATE 5 MG: 10 TABLET ORAL at 08:18

## 2019-01-01 RX ADMIN — CLOPIDOGREL BISULFATE 75 MG: 75 TABLET ORAL at 09:29

## 2019-01-01 RX ADMIN — ASPIRIN 81 MG: 81 TABLET, COATED ORAL at 08:58

## 2019-01-01 RX ADMIN — FENOFIBRATE 54 MG: 54 TABLET ORAL at 15:02

## 2019-01-01 RX ADMIN — FUROSEMIDE 60 MG: 40 TABLET ORAL at 08:49

## 2019-01-01 RX ADMIN — Medication 10 ML: at 16:13

## 2019-01-01 RX ADMIN — PANTOPRAZOLE SODIUM 40 MG: 40 TABLET, DELAYED RELEASE ORAL at 08:18

## 2019-01-01 RX ADMIN — ESCITALOPRAM OXALATE 5 MG: 10 TABLET ORAL at 15:02

## 2019-01-01 RX ADMIN — ESCITALOPRAM OXALATE 5 MG: 10 TABLET, FILM COATED ORAL at 08:58

## 2019-01-01 RX ADMIN — DOCUSATE SODIUM 100 MG: 100 CAPSULE, LIQUID FILLED ORAL at 09:41

## 2019-01-01 RX ADMIN — ATORVASTATIN CALCIUM 40 MG: 40 TABLET, FILM COATED ORAL at 08:58

## 2019-01-01 RX ADMIN — DOCUSATE SODIUM 100 MG: 100 CAPSULE, LIQUID FILLED ORAL at 09:30

## 2019-01-01 RX ADMIN — ASPIRIN 81 MG: 81 TABLET, COATED ORAL at 11:48

## 2019-01-01 RX ADMIN — LEVOTHYROXINE SODIUM 100 MCG: 100 TABLET ORAL at 09:30

## 2019-01-01 RX ADMIN — ATORVASTATIN CALCIUM 40 MG: 40 TABLET, FILM COATED ORAL at 15:02

## 2019-01-01 RX ADMIN — ATORVASTATIN CALCIUM 40 MG: 40 TABLET, FILM COATED ORAL at 09:27

## 2019-01-01 RX ADMIN — METOPROLOL SUCCINATE 100 MG: 100 TABLET, EXTENDED RELEASE ORAL at 10:26

## 2019-01-01 RX ADMIN — DOCUSATE SODIUM 100 MG: 100 CAPSULE, LIQUID FILLED ORAL at 11:47

## 2019-01-01 RX ADMIN — ATORVASTATIN CALCIUM 40 MG: 40 TABLET, FILM COATED ORAL at 08:48

## 2019-01-01 RX ADMIN — ESCITALOPRAM OXALATE 5 MG: 10 TABLET ORAL at 10:27

## 2019-01-01 RX ADMIN — CHOLECALCIFEROL TAB 25 MCG (1000 UNIT) 1000 UNITS: 25 TAB at 09:30

## 2019-01-01 RX ADMIN — ESCITALOPRAM OXALATE 5 MG: 10 TABLET ORAL at 08:49

## 2019-01-01 RX ADMIN — FENOFIBRATE 54 MG: 54 TABLET ORAL at 09:27

## 2019-01-01 RX ADMIN — LEVOTHYROXINE SODIUM 100 MCG: 100 TABLET ORAL at 15:02

## 2019-01-01 RX ADMIN — BACITRACIN ZINC AND POLYMYXIN B SULFATE: at 20:42

## 2019-01-01 RX ADMIN — CHOLECALCIFEROL TAB 25 MCG (1000 UNIT) 1000 UNITS: 25 TAB at 15:01

## 2019-01-01 RX ADMIN — MULTIPLE VITAMINS W/ MINERALS TAB 1 TABLET: TAB at 08:58

## 2019-01-01 RX ADMIN — DILTIAZEM HYDROCHLORIDE 120 MG: 120 CAPSULE, COATED, EXTENDED RELEASE ORAL at 08:48

## 2019-01-01 RX ADMIN — SODIUM CHLORIDE 250 ML: 9 INJECTION, SOLUTION INTRAVENOUS at 23:54

## 2019-01-01 RX ADMIN — IRON SUCROSE 100 MG: 20 INJECTION, SOLUTION INTRAVENOUS at 08:23

## 2019-01-01 RX ADMIN — PANTOPRAZOLE SODIUM 40 MG: 40 TABLET, DELAYED RELEASE ORAL at 06:06

## 2019-01-01 RX ADMIN — Medication 10 ML: at 09:26

## 2019-01-01 RX ADMIN — CHOLECALCIFEROL TAB 25 MCG (1000 UNIT) 1000 UNITS: 25 TAB at 09:26

## 2019-01-01 RX ADMIN — Medication 10 ML: at 20:36

## 2019-01-01 RX ADMIN — FENOFIBRATE 54 MG: 54 TABLET ORAL at 09:23

## 2019-01-01 RX ADMIN — FUROSEMIDE 60 MG: 40 TABLET ORAL at 08:18

## 2019-01-01 RX ADMIN — DOCUSATE SODIUM 100 MG: 100 CAPSULE, LIQUID FILLED ORAL at 10:54

## 2019-01-01 RX ADMIN — ESCITALOPRAM OXALATE 5 MG: 10 TABLET ORAL at 07:50

## 2019-01-01 RX ADMIN — HEPARIN SODIUM 5000 UNITS: 5000 INJECTION INTRAVENOUS; SUBCUTANEOUS at 13:30

## 2019-01-01 RX ADMIN — Medication 10 ML: at 08:24

## 2019-01-01 RX ADMIN — SODIUM CHLORIDE, SODIUM LACTATE, POTASSIUM CHLORIDE, AND CALCIUM CHLORIDE: 600; 310; 30; 20 INJECTION, SOLUTION INTRAVENOUS at 11:24

## 2019-01-01 RX ADMIN — FENOFIBRATE 54 MG: 54 TABLET ORAL at 09:30

## 2019-01-01 RX ADMIN — ATORVASTATIN CALCIUM 40 MG: 40 TABLET, FILM COATED ORAL at 11:47

## 2019-01-01 RX ADMIN — METOPROLOL SUCCINATE 100 MG: 100 TABLET, EXTENDED RELEASE ORAL at 15:02

## 2019-01-01 RX ADMIN — SODIUM CHLORIDE: 450 INJECTION, SOLUTION INTRAVENOUS at 08:30

## 2019-01-01 RX ADMIN — DOCUSATE SODIUM 100 MG: 100 CAPSULE, LIQUID FILLED ORAL at 07:50

## 2019-01-01 RX ADMIN — LIDOCAINE HYDROCHLORIDE,EPINEPHRINE BITARTRATE 20 ML: 10; .01 INJECTION, SOLUTION INFILTRATION; PERINEURAL at 19:59

## 2019-01-01 RX ADMIN — METOPROLOL SUCCINATE 100 MG: 100 TABLET, EXTENDED RELEASE ORAL at 09:47

## 2019-01-01 RX ADMIN — LEVOTHYROXINE SODIUM 100 MCG: 100 TABLET ORAL at 08:48

## 2019-01-01 RX ADMIN — Medication 10 ML: at 20:42

## 2019-01-01 RX ADMIN — ALLOPURINOL 100 MG: 100 TABLET ORAL at 15:02

## 2019-01-01 RX ADMIN — MULTIPLE VITAMINS W/ MINERALS TAB 1 TABLET: TAB at 09:26

## 2019-01-01 RX ADMIN — VITAMIN D, TAB 1000IU (100/BT) 1000 UNITS: 25 TAB at 08:58

## 2019-01-01 RX ADMIN — PANTOPRAZOLE SODIUM 40 MG: 40 TABLET, DELAYED RELEASE ORAL at 09:32

## 2019-01-01 RX ADMIN — METOPROLOL SUCCINATE 100 MG: 100 TABLET, EXTENDED RELEASE ORAL at 10:55

## 2019-01-01 RX ADMIN — FUROSEMIDE 60 MG: 40 TABLET ORAL at 18:14

## 2019-01-01 RX ADMIN — FENOFIBRATE 54 MG: 54 TABLET ORAL at 11:47

## 2019-01-01 RX ADMIN — FENOFIBRATE 54 MG: 54 TABLET ORAL at 09:39

## 2019-01-01 RX ADMIN — Medication 10 ML: at 21:47

## 2019-01-01 RX ADMIN — FUROSEMIDE 10 MG/HR: 10 INJECTION, SOLUTION INTRAMUSCULAR; INTRAVENOUS at 21:12

## 2019-01-01 RX ADMIN — SODIUM CHLORIDE: 4.5 INJECTION, SOLUTION INTRAVENOUS at 07:25

## 2019-01-01 RX ADMIN — LEVOTHYROXINE SODIUM 100 MCG: 100 TABLET ORAL at 11:47

## 2019-01-01 RX ADMIN — Medication 5 MG: at 20:36

## 2019-01-01 RX ADMIN — ESCITALOPRAM OXALATE 5 MG: 10 TABLET ORAL at 08:08

## 2019-01-01 RX ADMIN — LEVOTHYROXINE SODIUM 100 MCG: 100 TABLET ORAL at 07:50

## 2019-01-01 RX ADMIN — FAMOTIDINE 20 MG: 20 TABLET ORAL at 08:58

## 2019-01-01 RX ADMIN — ASPIRIN 81 MG: 81 TABLET, COATED ORAL at 09:40

## 2019-01-01 RX ADMIN — ALLOPURINOL 100 MG: 100 TABLET ORAL at 09:23

## 2019-01-01 RX ADMIN — ATORVASTATIN CALCIUM 40 MG: 40 TABLET, FILM COATED ORAL at 10:55

## 2019-01-01 RX ADMIN — DILTIAZEM HYDROCHLORIDE 120 MG: 120 CAPSULE, COATED, EXTENDED RELEASE ORAL at 11:47

## 2019-01-01 RX ADMIN — HEPARIN SODIUM 5000 UNITS: 5000 INJECTION INTRAVENOUS; SUBCUTANEOUS at 20:06

## 2019-01-01 RX ADMIN — CHOLECALCIFEROL TAB 25 MCG (1000 UNIT) 1000 UNITS: 25 TAB at 10:27

## 2019-01-01 RX ADMIN — MULTIPLE VITAMINS W/ MINERALS TAB 1 TABLET: TAB at 08:08

## 2019-01-01 RX ADMIN — DILTIAZEM HYDROCHLORIDE 120 MG: 120 CAPSULE, COATED, EXTENDED RELEASE ORAL at 08:58

## 2019-01-01 RX ADMIN — POLYETHYLENE GLYCOL-3350 AND ELECTROLYTES 4000 ML: 236; 6.74; 5.86; 2.97; 22.74 POWDER, FOR SOLUTION ORAL at 13:30

## 2019-01-01 RX ADMIN — MULTIPLE VITAMINS W/ MINERALS TAB 1 TABLET: TAB at 08:48

## 2019-01-01 RX ADMIN — ALLOPURINOL 100 MG: 100 TABLET ORAL at 10:27

## 2019-01-01 RX ADMIN — ASPIRIN 81 MG: 81 TABLET, COATED ORAL at 15:02

## 2019-01-01 RX ADMIN — SODIUM CHLORIDE 250 ML: 9 INJECTION, SOLUTION INTRAVENOUS at 08:50

## 2019-01-01 RX ADMIN — IRON SUCROSE 100 MG: 20 INJECTION, SOLUTION INTRAVENOUS at 15:45

## 2019-01-01 RX ADMIN — ALLOPURINOL 100 MG: 100 TABLET ORAL at 11:47

## 2019-01-01 RX ADMIN — CHOLECALCIFEROL TAB 25 MCG (1000 UNIT) 1000 UNITS: 25 TAB at 08:48

## 2019-01-01 RX ADMIN — PANTOPRAZOLE SODIUM 40 MG: 40 TABLET, DELAYED RELEASE ORAL at 08:48

## 2019-01-01 RX ADMIN — FAMOTIDINE 20 MG: 20 TABLET ORAL at 15:02

## 2019-01-01 RX ADMIN — FAMOTIDINE 20 MG: 20 TABLET ORAL at 11:46

## 2019-01-01 RX ADMIN — LEVOTHYROXINE SODIUM 100 MCG: 100 TABLET ORAL at 08:08

## 2019-01-01 RX ADMIN — CHOLECALCIFEROL TAB 25 MCG (1000 UNIT) 1000 UNITS: 25 TAB at 10:55

## 2019-01-01 RX ADMIN — ESCITALOPRAM OXALATE 5 MG: 10 TABLET ORAL at 09:26

## 2019-01-01 RX ADMIN — DILTIAZEM HYDROCHLORIDE 120 MG: 120 CAPSULE, COATED, EXTENDED RELEASE ORAL at 07:50

## 2019-01-01 RX ADMIN — CHOLECALCIFEROL TAB 25 MCG (1000 UNIT) 1000 UNITS: 25 TAB at 08:08

## 2019-01-01 RX ADMIN — HEPARIN SODIUM 5000 UNITS: 5000 INJECTION INTRAVENOUS; SUBCUTANEOUS at 06:00

## 2019-01-01 RX ADMIN — PROPOFOL 80 MCG/KG/MIN: 10 INJECTION, EMULSION INTRAVENOUS at 11:34

## 2019-01-01 RX ADMIN — Medication 10 ML: at 21:18

## 2019-01-01 RX ADMIN — DILTIAZEM HYDROCHLORIDE 120 MG: 120 CAPSULE, COATED, EXTENDED RELEASE ORAL at 09:40

## 2019-01-01 RX ADMIN — SODIUM CHLORIDE: 450 INJECTION, SOLUTION INTRAVENOUS at 22:51

## 2019-01-01 RX ADMIN — DILTIAZEM HYDROCHLORIDE 120 MG: 120 CAPSULE, COATED, EXTENDED RELEASE ORAL at 15:02

## 2019-01-01 RX ADMIN — FAMOTIDINE 20 MG: 20 TABLET ORAL at 09:40

## 2019-01-01 RX ADMIN — Medication 10 ML: at 09:30

## 2019-01-01 RX ADMIN — ALLOPURINOL 100 MG: 100 TABLET ORAL at 08:09

## 2019-01-01 RX ADMIN — ESCITALOPRAM OXALATE 5 MG: 10 TABLET, FILM COATED ORAL at 11:46

## 2019-01-01 RX ADMIN — LEVOTHYROXINE SODIUM 100 MCG: 100 TABLET ORAL at 08:18

## 2019-01-01 RX ADMIN — CLOPIDOGREL BISULFATE 75 MG: 75 TABLET ORAL at 15:02

## 2019-01-01 RX ADMIN — FUROSEMIDE 10 MG/HR: 10 INJECTION, SOLUTION INTRAMUSCULAR; INTRAVENOUS at 07:34

## 2019-01-01 RX ADMIN — FENOFIBRATE 54 MG: 54 TABLET ORAL at 10:55

## 2019-01-01 RX ADMIN — POLYETHYLENE GLYCOL-3350 AND ELECTROLYTES 4000 ML: 236; 6.74; 5.86; 2.97; 22.74 POWDER, FOR SOLUTION ORAL at 13:53

## 2019-01-01 RX ADMIN — METOPROLOL SUCCINATE 100 MG: 100 TABLET, EXTENDED RELEASE ORAL at 07:50

## 2019-01-01 RX ADMIN — CHOLECALCIFEROL TAB 25 MCG (1000 UNIT) 1000 UNITS: 25 TAB at 09:23

## 2019-01-01 RX ADMIN — IRON SUCROSE 100 MG: 20 INJECTION, SOLUTION INTRAVENOUS at 18:24

## 2019-01-01 RX ADMIN — LEVOTHYROXINE SODIUM 100 MCG: 100 TABLET ORAL at 09:26

## 2019-01-01 RX ADMIN — HEPARIN SODIUM 5000 UNITS: 5000 INJECTION INTRAVENOUS; SUBCUTANEOUS at 19:32

## 2019-01-01 RX ADMIN — IRON SUCROSE 100 MG: 20 INJECTION, SOLUTION INTRAVENOUS at 09:40

## 2019-01-01 RX ADMIN — Medication 10 ML: at 10:55

## 2019-01-01 RX ADMIN — LEVOTHYROXINE SODIUM 100 MCG: 100 TABLET ORAL at 08:58

## 2019-01-01 RX ADMIN — PANTOPRAZOLE SODIUM 40 MG: 40 TABLET, DELAYED RELEASE ORAL at 05:32

## 2019-01-01 RX ADMIN — ATORVASTATIN CALCIUM 40 MG: 40 TABLET, FILM COATED ORAL at 09:30

## 2019-01-01 RX ADMIN — METOPROLOL SUCCINATE 100 MG: 100 TABLET, EXTENDED RELEASE ORAL at 08:08

## 2019-01-01 RX ADMIN — MULTIPLE VITAMINS W/ MINERALS TAB 1 TABLET: TAB at 08:18

## 2019-01-01 RX ADMIN — CHOLECALCIFEROL TAB 25 MCG (1000 UNIT) 1000 UNITS: 25 TAB at 08:18

## 2019-01-01 RX ADMIN — MULTIPLE VITAMINS W/ MINERALS TAB 1 TABLET: TAB at 11:48

## 2019-01-01 RX ADMIN — MULTIPLE VITAMINS W/ MINERALS TAB 1 TABLET: TAB at 09:23

## 2019-01-01 RX ADMIN — LEVOTHYROXINE SODIUM 100 MCG: 100 TABLET ORAL at 09:23

## 2019-01-01 RX ADMIN — FUROSEMIDE 40 MG: 40 TABLET ORAL at 09:26

## 2019-01-01 RX ADMIN — HEPARIN SODIUM 5000 UNITS: 5000 INJECTION INTRAVENOUS; SUBCUTANEOUS at 21:18

## 2019-01-01 RX ADMIN — ATORVASTATIN CALCIUM 40 MG: 40 TABLET, FILM COATED ORAL at 09:23

## 2019-01-01 RX ADMIN — METOPROLOL SUCCINATE 100 MG: 100 TABLET, EXTENDED RELEASE ORAL at 08:58

## 2019-01-01 RX ADMIN — ESCITALOPRAM OXALATE 5 MG: 10 TABLET ORAL at 09:23

## 2019-01-01 RX ADMIN — DOCUSATE SODIUM 100 MG: 100 CAPSULE, LIQUID FILLED ORAL at 09:27

## 2019-01-01 RX ADMIN — IRON SUCROSE 100 MG: 20 INJECTION, SOLUTION INTRAVENOUS at 16:13

## 2019-01-01 RX ADMIN — VITAMIN D, TAB 1000IU (100/BT) 1000 UNITS: 25 TAB at 11:47

## 2019-01-01 RX ADMIN — LEVOTHYROXINE SODIUM 100 MCG: 100 TABLET ORAL at 10:54

## 2019-01-01 RX ADMIN — Medication 10 ML: at 21:54

## 2019-01-01 RX ADMIN — ATORVASTATIN CALCIUM 40 MG: 40 TABLET, FILM COATED ORAL at 10:27

## 2019-01-01 RX ADMIN — METOPROLOL SUCCINATE 100 MG: 100 TABLET, EXTENDED RELEASE ORAL at 09:23

## 2019-01-01 RX ADMIN — BISACODYL 20 MG: 5 TABLET, COATED ORAL at 12:24

## 2019-01-01 RX ADMIN — DILTIAZEM HYDROCHLORIDE 120 MG: 120 CAPSULE, COATED, EXTENDED RELEASE ORAL at 08:09

## 2019-01-01 RX ADMIN — DOCUSATE SODIUM 100 MG: 100 CAPSULE, LIQUID FILLED ORAL at 09:23

## 2019-01-01 RX ADMIN — DILTIAZEM HYDROCHLORIDE 120 MG: 120 CAPSULE, COATED, EXTENDED RELEASE ORAL at 09:23

## 2019-01-01 RX ADMIN — ESCITALOPRAM OXALATE 5 MG: 10 TABLET ORAL at 10:55

## 2019-01-01 RX ADMIN — SODIUM CHLORIDE: 900 INJECTION, SOLUTION INTRAVENOUS at 15:09

## 2019-01-01 RX ADMIN — ATORVASTATIN CALCIUM 40 MG: 40 TABLET, FILM COATED ORAL at 08:18

## 2019-01-01 RX ADMIN — Medication 10 ML: at 09:27

## 2019-01-01 RX ADMIN — LEVOTHYROXINE SODIUM 100 MCG: 100 TABLET ORAL at 10:27

## 2019-01-01 RX ADMIN — PANTOPRAZOLE SODIUM 40 MG: 40 TABLET, DELAYED RELEASE ORAL at 06:40

## 2019-01-01 RX ADMIN — ASPIRIN 81 MG: 81 TABLET, COATED ORAL at 09:29

## 2019-01-01 RX ADMIN — VITAMIN D, TAB 1000IU (100/BT) 1000 UNITS: 25 TAB at 09:40

## 2019-01-01 RX ADMIN — POTASSIUM CHLORIDE 40 MEQ: 20 TABLET, EXTENDED RELEASE ORAL at 20:42

## 2019-01-01 RX ADMIN — FAMOTIDINE 20 MG: 20 TABLET ORAL at 09:29

## 2019-01-01 RX ADMIN — MULTIPLE VITAMINS W/ MINERALS TAB 1 TABLET: TAB at 09:40

## 2019-01-01 RX ADMIN — DILTIAZEM HYDROCHLORIDE 120 MG: 120 CAPSULE, COATED, EXTENDED RELEASE ORAL at 08:18

## 2019-01-01 RX ADMIN — FUROSEMIDE 40 MG: 10 INJECTION, SOLUTION INTRAMUSCULAR; INTRAVENOUS at 18:06

## 2019-01-01 RX ADMIN — ALLOPURINOL 100 MG: 100 TABLET ORAL at 10:55

## 2019-01-01 RX ADMIN — METOPROLOL SUCCINATE 100 MG: 100 TABLET, EXTENDED RELEASE ORAL at 09:26

## 2019-01-01 RX ADMIN — ESCITALOPRAM OXALATE 5 MG: 10 TABLET, FILM COATED ORAL at 09:41

## 2019-01-01 RX ADMIN — HEPARIN SODIUM 5000 UNITS: 5000 INJECTION INTRAVENOUS; SUBCUTANEOUS at 05:15

## 2019-01-01 RX ADMIN — DILTIAZEM HYDROCHLORIDE 120 MG: 120 CAPSULE, COATED, EXTENDED RELEASE ORAL at 10:55

## 2019-01-01 RX ADMIN — METOPROLOL SUCCINATE 100 MG: 100 TABLET, EXTENDED RELEASE ORAL at 08:48

## 2019-01-01 RX ADMIN — SODIUM CHLORIDE: 450 INJECTION, SOLUTION INTRAVENOUS at 11:32

## 2019-01-01 RX ADMIN — ALLOPURINOL 100 MG: 100 TABLET ORAL at 08:18

## 2019-01-01 RX ADMIN — FENOFIBRATE 54 MG: 54 TABLET ORAL at 10:26

## 2019-01-01 RX ADMIN — MULTIPLE VITAMINS W/ MINERALS TAB 1 TABLET: TAB at 07:50

## 2019-01-01 RX ADMIN — ALLOPURINOL 100 MG: 100 TABLET ORAL at 08:58

## 2019-01-01 RX ADMIN — SODIUM CHLORIDE: 450 INJECTION, SOLUTION INTRAVENOUS at 18:15

## 2019-01-01 RX ADMIN — DILTIAZEM HYDROCHLORIDE 120 MG: 120 CAPSULE, COATED, EXTENDED RELEASE ORAL at 09:29

## 2019-01-01 RX ADMIN — FENOFIBRATE 54 MG: 54 TABLET ORAL at 08:48

## 2019-01-01 RX ADMIN — ALLOPURINOL 100 MG: 100 TABLET ORAL at 08:48

## 2019-01-01 RX ADMIN — Medication 10 ML: at 07:51

## 2019-01-01 RX ADMIN — MULTIPLE VITAMINS W/ MINERALS TAB 1 TABLET: TAB at 15:02

## 2019-01-01 RX ADMIN — FENOFIBRATE 54 MG: 54 TABLET ORAL at 08:09

## 2019-01-01 RX ADMIN — Medication 10 ML: at 20:09

## 2019-01-01 RX ADMIN — ALLOPURINOL 100 MG: 100 TABLET ORAL at 09:29

## 2019-01-01 RX ADMIN — ALPRAZOLAM 0.25 MG: 0.25 TABLET ORAL at 19:33

## 2019-01-01 RX ADMIN — ALLOPURINOL 100 MG: 100 TABLET ORAL at 07:50

## 2019-01-01 RX ADMIN — DOCUSATE SODIUM 100 MG: 100 CAPSULE, LIQUID FILLED ORAL at 08:48

## 2019-01-01 RX ADMIN — HEPARIN SODIUM 5000 UNITS: 5000 INJECTION INTRAVENOUS; SUBCUTANEOUS at 15:44

## 2019-01-01 ASSESSMENT — PAIN SCALES - GENERAL
PAINLEVEL_OUTOF10: 0
PAINLEVEL_OUTOF10: 10
PAINLEVEL_OUTOF10: 0

## 2019-01-01 ASSESSMENT — PAIN - FUNCTIONAL ASSESSMENT
PAIN_FUNCTIONAL_ASSESSMENT: 0-10

## 2019-01-01 ASSESSMENT — ENCOUNTER SYMPTOMS
GASTROINTESTINAL NEGATIVE: 1
SHORTNESS OF BREATH: 1
RESPIRATORY NEGATIVE: 1
SHORTNESS OF BREATH: 0
VOMITING: 0
BACK PAIN: 0
ABDOMINAL PAIN: 0
NAUSEA: 0

## 2019-01-01 ASSESSMENT — PULMONARY FUNCTION TESTS
PIF_VALUE: 0

## 2019-01-01 ASSESSMENT — LIFESTYLE VARIABLES: SMOKING_STATUS: 0

## 2019-01-07 RX ORDER — FENOFIBRATE 54 MG/1
TABLET ORAL
Qty: 30 TABLET | Refills: 0 | Status: SHIPPED | OUTPATIENT
Start: 2019-01-07 | End: 2019-02-03 | Stop reason: SDUPTHER

## 2019-01-11 RX ORDER — ESCITALOPRAM OXALATE 5 MG/1
TABLET ORAL
Qty: 90 TABLET | Refills: 0 | Status: SHIPPED | OUTPATIENT
Start: 2019-01-11 | End: 2019-04-11 | Stop reason: SDUPTHER

## 2019-01-18 ENCOUNTER — CARE COORDINATION (OUTPATIENT)
Dept: INTERNAL MEDICINE CLINIC | Age: 84
End: 2019-01-18

## 2019-01-18 DIAGNOSIS — F41.9 ANXIETY: Primary | ICD-10-CM

## 2019-01-21 RX ORDER — ALPRAZOLAM 0.25 MG/1
TABLET ORAL
Qty: 60 TABLET | Refills: 0 | OUTPATIENT
Start: 2019-01-21 | End: 2019-02-20

## 2019-02-04 RX ORDER — FENOFIBRATE 54 MG/1
TABLET ORAL
Qty: 30 TABLET | Refills: 1 | Status: SHIPPED | OUTPATIENT
Start: 2019-02-04 | End: 2019-04-02 | Stop reason: SDUPTHER

## 2019-02-12 ENCOUNTER — CARE COORDINATION (OUTPATIENT)
Dept: INTERNAL MEDICINE CLINIC | Age: 84
End: 2019-02-12

## 2019-02-14 ENCOUNTER — OFFICE VISIT (OUTPATIENT)
Dept: INTERNAL MEDICINE CLINIC | Age: 84
End: 2019-02-14
Payer: MEDICARE

## 2019-02-14 VITALS
HEIGHT: 67 IN | RESPIRATION RATE: 12 BRPM | SYSTOLIC BLOOD PRESSURE: 130 MMHG | HEART RATE: 58 BPM | WEIGHT: 135 LBS | OXYGEN SATURATION: 98 % | DIASTOLIC BLOOD PRESSURE: 60 MMHG | BODY MASS INDEX: 21.19 KG/M2

## 2019-02-14 DIAGNOSIS — I48.0 PAROXYSMAL ATRIAL FIBRILLATION (HCC): ICD-10-CM

## 2019-02-14 DIAGNOSIS — I47.1 ATRIAL TACHYCARDIA (HCC): ICD-10-CM

## 2019-02-14 DIAGNOSIS — E03.9 ACQUIRED HYPOTHYROIDISM: ICD-10-CM

## 2019-02-14 DIAGNOSIS — Z00.00 ROUTINE GENERAL MEDICAL EXAMINATION AT A HEALTH CARE FACILITY: ICD-10-CM

## 2019-02-14 DIAGNOSIS — E78.2 MIXED HYPERLIPIDEMIA: ICD-10-CM

## 2019-02-14 DIAGNOSIS — I50.9 CONGESTIVE HEART FAILURE, UNSPECIFIED HF CHRONICITY, UNSPECIFIED HEART FAILURE TYPE (HCC): ICD-10-CM

## 2019-02-14 DIAGNOSIS — Z00.00 WELL ADULT EXAM: Primary | ICD-10-CM

## 2019-02-14 LAB
CHOLESTEROL, TOTAL: 146 MG/DL (ref 0–199)
HDLC SERPL-MCNC: 57 MG/DL (ref 40–60)
LDL CHOLESTEROL CALCULATED: 73 MG/DL
T4 FREE: 1.3 NG/DL (ref 0.9–1.8)
TRIGL SERPL-MCNC: 80 MG/DL (ref 0–150)
TSH SERPL DL<=0.05 MIU/L-ACNC: 2.5 UIU/ML (ref 0.27–4.2)
VLDLC SERPL CALC-MCNC: 16 MG/DL

## 2019-02-14 PROCEDURE — 4040F PNEUMOC VAC/ADMIN/RCVD: CPT | Performed by: INTERNAL MEDICINE

## 2019-02-14 PROCEDURE — G8482 FLU IMMUNIZE ORDER/ADMIN: HCPCS | Performed by: INTERNAL MEDICINE

## 2019-02-14 PROCEDURE — G0439 PPPS, SUBSEQ VISIT: HCPCS | Performed by: INTERNAL MEDICINE

## 2019-02-14 PROCEDURE — G0444 DEPRESSION SCREEN ANNUAL: HCPCS | Performed by: INTERNAL MEDICINE

## 2019-02-14 PROCEDURE — G8598 ASA/ANTIPLAT THER USED: HCPCS | Performed by: INTERNAL MEDICINE

## 2019-02-14 ASSESSMENT — LIFESTYLE VARIABLES
HOW OFTEN DURING THE LAST YEAR HAVE YOU HAD A FEELING OF GUILT OR REMORSE AFTER DRINKING: 0
HOW OFTEN DURING THE LAST YEAR HAVE YOU FAILED TO DO WHAT WAS NORMALLY EXPECTED FROM YOU BECAUSE OF DRINKING: 0
HOW OFTEN DO YOU HAVE SIX OR MORE DRINKS ON ONE OCCASION: 0
HOW OFTEN DURING THE LAST YEAR HAVE YOU FOUND THAT YOU WERE NOT ABLE TO STOP DRINKING ONCE YOU HAD STARTED: 0
HOW MANY STANDARD DRINKS CONTAINING ALCOHOL DO YOU HAVE ON A TYPICAL DAY: 0
HAVE YOU OR SOMEONE ELSE BEEN INJURED AS A RESULT OF YOUR DRINKING: 0
AUDIT TOTAL SCORE: 4
HOW OFTEN DO YOU HAVE A DRINK CONTAINING ALCOHOL: 4
HAS A RELATIVE, FRIEND, DOCTOR, OR ANOTHER HEALTH PROFESSIONAL EXPRESSED CONCERN ABOUT YOUR DRINKING OR SUGGESTED YOU CUT DOWN: 0
HOW OFTEN DURING THE LAST YEAR HAVE YOU NEEDED AN ALCOHOLIC DRINK FIRST THING IN THE MORNING TO GET YOURSELF GOING AFTER A NIGHT OF HEAVY DRINKING: 0
AUDIT-C TOTAL SCORE: 4
HOW OFTEN DURING THE LAST YEAR HAVE YOU BEEN UNABLE TO REMEMBER WHAT HAPPENED THE NIGHT BEFORE BECAUSE YOU HAD BEEN DRINKING: 0

## 2019-02-14 ASSESSMENT — PATIENT HEALTH QUESTIONNAIRE - PHQ9
4. FEELING TIRED OR HAVING LITTLE ENERGY: 2
SUM OF ALL RESPONSES TO PHQ QUESTIONS 1-9: 5
SUM OF ALL RESPONSES TO PHQ QUESTIONS 1-9: 5

## 2019-02-14 ASSESSMENT — ANXIETY QUESTIONNAIRES: GAD7 TOTAL SCORE: 0

## 2019-03-04 ENCOUNTER — TELEPHONE (OUTPATIENT)
Dept: INTERNAL MEDICINE CLINIC | Age: 84
End: 2019-03-04

## 2019-03-04 ENCOUNTER — TELEPHONE (OUTPATIENT)
Dept: CARDIOLOGY CLINIC | Age: 84
End: 2019-03-04

## 2019-03-04 DIAGNOSIS — R05.9 COUGH: ICD-10-CM

## 2019-03-04 DIAGNOSIS — J11.1 INFLUENZA: Primary | ICD-10-CM

## 2019-03-05 ENCOUNTER — HOSPITAL ENCOUNTER (INPATIENT)
Age: 84
LOS: 1 days | Discharge: HOME | DRG: 292 | End: 2019-03-06
Attending: EMERGENCY MEDICINE | Admitting: HOSPITALIST
Payer: MEDICARE

## 2019-03-05 ENCOUNTER — APPOINTMENT (OUTPATIENT)
Dept: GENERAL RADIOLOGY | Age: 84
DRG: 292 | End: 2019-03-05
Payer: MEDICARE

## 2019-03-05 ENCOUNTER — APPOINTMENT (OUTPATIENT)
Dept: CT IMAGING | Age: 84
DRG: 292 | End: 2019-03-05
Payer: MEDICARE

## 2019-03-05 ENCOUNTER — APPOINTMENT (OUTPATIENT)
Dept: ULTRASOUND IMAGING | Age: 84
DRG: 292 | End: 2019-03-05
Payer: MEDICARE

## 2019-03-05 ENCOUNTER — TELEPHONE (OUTPATIENT)
Dept: INTERNAL MEDICINE CLINIC | Age: 84
End: 2019-03-05

## 2019-03-05 DIAGNOSIS — J18.9 PNEUMONIA DUE TO ORGANISM: Primary | ICD-10-CM

## 2019-03-05 DIAGNOSIS — Z98.890 S/P THORACENTESIS: ICD-10-CM

## 2019-03-05 LAB
APPEARANCE FLUID: CLEAR
B-TYPE NATRIURETIC PEPTIDE: 1395 PG/ML (ref 0–99.9)
BASE EXCESS VENOUS: 10 (ref -3–3)
BASOPHILS ABSOLUTE: 0 K/UL (ref 0–0.2)
BASOPHILS RELATIVE PERCENT: 0.6 %
CALCIUM IONIZED: 1.11 MMOL/L (ref 1.12–1.32)
CELL COUNT FLUID TYPE: NORMAL
CLOT EVALUATION: NORMAL
CO2: 32 MMOL/L (ref 21–32)
COLOR FLUID: NORMAL
EKG ATRIAL RATE: 64 BPM
EKG DIAGNOSIS: NORMAL
EKG P AXIS: -3 DEGREES
EKG P-R INTERVAL: 166 MS
EKG Q-T INTERVAL: 462 MS
EKG QRS DURATION: 96 MS
EKG QTC CALCULATION (BAZETT): 476 MS
EKG R AXIS: -11 DEGREES
EKG T AXIS: 189 DEGREES
EKG VENTRICULAR RATE: 64 BPM
EOSINOPHILS ABSOLUTE: 0.2 K/UL (ref 0–0.6)
EOSINOPHILS RELATIVE PERCENT: 5 %
FLUID TYPE: NORMAL
GFR AFRICAN AMERICAN: 38
GFR NON-AFRICAN AMERICAN: 31
GLUCOSE BLD-MCNC: 107 MG/DL (ref 70–99)
GLUCOSE, FLUID: 113 MG/DL
HCO3 VENOUS: 33.4 MMOL/L (ref 23–29)
HCT VFR BLD CALC: 33.9 % (ref 40.5–52.5)
HEMOGLOBIN: 11.1 G/DL (ref 13.5–17.5)
INR BLD: 1.14 (ref 0.86–1.14)
LACTATE DEHYDROGENASE: 223 U/L (ref 100–190)
LACTATE: 1.06 MMOL/L (ref 0.4–2)
LACTIC ACID: 1.7 MMOL/L (ref 0.4–2)
LD, FLUID: 130 U/L
LYMPHOCYTES ABSOLUTE: 0.7 K/UL (ref 1–5.1)
LYMPHOCYTES RELATIVE PERCENT: 14.6 %
LYMPHOCYTES, BODY FLUID: 57 %
MCH RBC QN AUTO: 30.9 PG (ref 26–34)
MCHC RBC AUTO-ENTMCNC: 32.7 G/DL (ref 31–36)
MCV RBC AUTO: 94.2 FL (ref 80–100)
MONOCYTE, FLUID: 30 %
MONOCYTES ABSOLUTE: 0.7 K/UL (ref 0–1.3)
MONOCYTES RELATIVE PERCENT: 14 %
NEUTROPHIL, FLUID: 13 %
NEUTROPHILS ABSOLUTE: 3.1 K/UL (ref 1.7–7.7)
NEUTROPHILS RELATIVE PERCENT: 65.8 %
NUCLEATED CELLS FLUID: 426 /CUMM
NUMBER OF CELLS COUNTED FLUID: 100
O2 SAT, VEN: 59 %
PCO2, VEN: 45.3 MM HG (ref 40–50)
PDW BLD-RTO: 17.6 % (ref 12.4–15.4)
PERFORMED ON: ABNORMAL
PERFORMED ON: NORMAL
PH VENOUS: 7.47 (ref 7.35–7.45)
PH, BODY FLUID: 8.2
PLATELET # BLD: 172 K/UL (ref 135–450)
PMV BLD AUTO: 7.3 FL (ref 5–10.5)
PO2, VEN: 29 MM HG
POC ANION GAP: 8 (ref 10–20)
POC BUN: 43 MG/DL (ref 7–18)
POC CHLORIDE: 104 MMOL/L (ref 99–110)
POC CREATININE: 2 MG/DL (ref 0.8–1.3)
POC POTASSIUM: 4.2 MMOL/L (ref 3.5–5.1)
POC SAMPLE TYPE: ABNORMAL
POC SAMPLE TYPE: NORMAL
POC SODIUM: 144 MMOL/L (ref 136–145)
POC TROPONIN I: 0.05 NG/ML (ref 0–0.1)
PROCALCITONIN: 0.1 NG/ML (ref 0–0.15)
PROTEIN FLUID: 3.1 G/DL
PROTHROMBIN TIME: 13 SEC (ref 9.8–13)
RAPID INFLUENZA  B AGN: NEGATIVE
RAPID INFLUENZA A AGN: NEGATIVE
RBC # BLD: 3.59 M/UL (ref 4.2–5.9)
RBC FLUID: 800 /CUMM
TCO2 CALC VENOUS: 35 MMOL/L
TOTAL PROTEIN: 7.5 G/DL (ref 6.4–8.2)
WBC # BLD: 4.8 K/UL (ref 4–11)

## 2019-03-05 PROCEDURE — 99222 1ST HOSP IP/OBS MODERATE 55: CPT | Performed by: HOSPITALIST

## 2019-03-05 PROCEDURE — 87449 NOS EACH ORGANISM AG IA: CPT

## 2019-03-05 PROCEDURE — 87205 SMEAR GRAM STAIN: CPT

## 2019-03-05 PROCEDURE — 71045 X-RAY EXAM CHEST 1 VIEW: CPT

## 2019-03-05 PROCEDURE — 0W993ZZ DRAINAGE OF RIGHT PLEURAL CAVITY, PERCUTANEOUS APPROACH: ICD-10-PCS | Performed by: RADIOLOGY

## 2019-03-05 PROCEDURE — 87015 SPECIMEN INFECT AGNT CONCNTJ: CPT

## 2019-03-05 PROCEDURE — 6360000002 HC RX W HCPCS: Performed by: STUDENT IN AN ORGANIZED HEALTH CARE EDUCATION/TRAINING PROGRAM

## 2019-03-05 PROCEDURE — 84157 ASSAY OF PROTEIN OTHER: CPT

## 2019-03-05 PROCEDURE — 82803 BLOOD GASES ANY COMBINATION: CPT

## 2019-03-05 PROCEDURE — 2580000003 HC RX 258: Performed by: EMERGENCY MEDICINE

## 2019-03-05 PROCEDURE — 84484 ASSAY OF TROPONIN QUANT: CPT

## 2019-03-05 PROCEDURE — 2580000003 HC RX 258: Performed by: STUDENT IN AN ORGANIZED HEALTH CARE EDUCATION/TRAINING PROGRAM

## 2019-03-05 PROCEDURE — 85610 PROTHROMBIN TIME: CPT

## 2019-03-05 PROCEDURE — 99285 EMERGENCY DEPT VISIT HI MDM: CPT

## 2019-03-05 PROCEDURE — 87116 MYCOBACTERIA CULTURE: CPT

## 2019-03-05 PROCEDURE — 94760 N-INVAS EAR/PLS OXIMETRY 1: CPT

## 2019-03-05 PROCEDURE — 71250 CT THORAX DX C-: CPT

## 2019-03-05 PROCEDURE — 85025 COMPLETE CBC W/AUTO DIFF WBC: CPT

## 2019-03-05 PROCEDURE — 84155 ASSAY OF PROTEIN SERUM: CPT

## 2019-03-05 PROCEDURE — 83605 ASSAY OF LACTIC ACID: CPT

## 2019-03-05 PROCEDURE — 87206 SMEAR FLUORESCENT/ACID STAI: CPT

## 2019-03-05 PROCEDURE — 6360000002 HC RX W HCPCS: Performed by: EMERGENCY MEDICINE

## 2019-03-05 PROCEDURE — 87040 BLOOD CULTURE FOR BACTERIA: CPT

## 2019-03-05 PROCEDURE — 83065 HEMOGLOBIN THERMOLABILE: CPT

## 2019-03-05 PROCEDURE — 82945 GLUCOSE OTHER FLUID: CPT

## 2019-03-05 PROCEDURE — 2709999900 US THORACENTESIS

## 2019-03-05 PROCEDURE — 88305 TISSUE EXAM BY PATHOLOGIST: CPT

## 2019-03-05 PROCEDURE — 93005 ELECTROCARDIOGRAM TRACING: CPT | Performed by: HOSPITALIST

## 2019-03-05 PROCEDURE — 83880 ASSAY OF NATRIURETIC PEPTIDE: CPT

## 2019-03-05 PROCEDURE — 89051 BODY FLUID CELL COUNT: CPT

## 2019-03-05 PROCEDURE — 71046 X-RAY EXAM CHEST 2 VIEWS: CPT

## 2019-03-05 PROCEDURE — 87804 INFLUENZA ASSAY W/OPTIC: CPT

## 2019-03-05 PROCEDURE — 36415 COLL VENOUS BLD VENIPUNCTURE: CPT

## 2019-03-05 PROCEDURE — 1200000000 HC SEMI PRIVATE

## 2019-03-05 PROCEDURE — 83615 LACTATE (LD) (LDH) ENZYME: CPT

## 2019-03-05 PROCEDURE — 88112 CYTOPATH CELL ENHANCE TECH: CPT

## 2019-03-05 PROCEDURE — 84145 PROCALCITONIN (PCT): CPT

## 2019-03-05 PROCEDURE — 6370000000 HC RX 637 (ALT 250 FOR IP): Performed by: STUDENT IN AN ORGANIZED HEALTH CARE EDUCATION/TRAINING PROGRAM

## 2019-03-05 PROCEDURE — 87070 CULTURE OTHR SPECIMN AEROBIC: CPT

## 2019-03-05 PROCEDURE — 80047 BASIC METABLC PNL IONIZED CA: CPT

## 2019-03-05 PROCEDURE — 83986 ASSAY PH BODY FLUID NOS: CPT

## 2019-03-05 RX ORDER — SODIUM CHLORIDE 0.9 % (FLUSH) 0.9 %
10 SYRINGE (ML) INJECTION EVERY 12 HOURS SCHEDULED
Status: DISCONTINUED | OUTPATIENT
Start: 2019-03-05 | End: 2019-03-06 | Stop reason: HOSPADM

## 2019-03-05 RX ORDER — SODIUM CHLORIDE 9 MG/ML
INJECTION, SOLUTION INTRAVENOUS
Status: DISPENSED
Start: 2019-03-05 | End: 2019-03-06

## 2019-03-05 RX ORDER — OSELTAMIVIR PHOSPHATE 75 MG/1
75 CAPSULE ORAL 2 TIMES DAILY
Qty: 10 CAPSULE | Refills: 0 | Status: SHIPPED | OUTPATIENT
Start: 2019-03-05 | End: 2019-03-05

## 2019-03-05 RX ORDER — DILTIAZEM HYDROCHLORIDE 120 MG/1
120 CAPSULE, COATED, EXTENDED RELEASE ORAL DAILY
Status: DISCONTINUED | OUTPATIENT
Start: 2019-03-06 | End: 2019-03-06 | Stop reason: HOSPADM

## 2019-03-05 RX ORDER — SODIUM CHLORIDE 0.9 % (FLUSH) 0.9 %
10 SYRINGE (ML) INJECTION PRN
Status: DISCONTINUED | OUTPATIENT
Start: 2019-03-05 | End: 2019-03-06 | Stop reason: HOSPADM

## 2019-03-05 RX ORDER — METOPROLOL SUCCINATE 100 MG/1
100 TABLET, EXTENDED RELEASE ORAL DAILY
Status: DISCONTINUED | OUTPATIENT
Start: 2019-03-06 | End: 2019-03-06 | Stop reason: HOSPADM

## 2019-03-05 RX ORDER — ALLOPURINOL 100 MG/1
100 TABLET ORAL DAILY
Status: DISCONTINUED | OUTPATIENT
Start: 2019-03-06 | End: 2019-03-06 | Stop reason: HOSPADM

## 2019-03-05 RX ORDER — FAMOTIDINE 20 MG/1
20 TABLET, FILM COATED ORAL DAILY
Status: DISCONTINUED | OUTPATIENT
Start: 2019-03-05 | End: 2019-03-06 | Stop reason: HOSPADM

## 2019-03-05 RX ORDER — ALPRAZOLAM 0.25 MG/1
0.25 TABLET ORAL DAILY PRN
COMMUNITY
End: 2019-03-20 | Stop reason: SDUPTHER

## 2019-03-05 RX ORDER — ATORVASTATIN CALCIUM 40 MG/1
40 TABLET, FILM COATED ORAL EVERY MORNING
Status: DISCONTINUED | OUTPATIENT
Start: 2019-03-06 | End: 2019-03-06 | Stop reason: HOSPADM

## 2019-03-05 RX ORDER — FUROSEMIDE 40 MG/1
40 TABLET ORAL 2 TIMES DAILY
Status: DISCONTINUED | OUTPATIENT
Start: 2019-03-05 | End: 2019-03-06 | Stop reason: HOSPADM

## 2019-03-05 RX ORDER — ESCITALOPRAM OXALATE 5 MG/1
5 TABLET ORAL DAILY
Status: DISCONTINUED | OUTPATIENT
Start: 2019-03-06 | End: 2019-03-06 | Stop reason: HOSPADM

## 2019-03-05 RX ORDER — ACETAMINOPHEN 325 MG/1
650 TABLET ORAL EVERY 4 HOURS PRN
Status: DISCONTINUED | OUTPATIENT
Start: 2019-03-05 | End: 2019-03-06 | Stop reason: HOSPADM

## 2019-03-05 RX ORDER — ALPRAZOLAM 0.25 MG/1
0.25 TABLET ORAL DAILY PRN
Status: DISCONTINUED | OUTPATIENT
Start: 2019-03-05 | End: 2019-03-06 | Stop reason: HOSPADM

## 2019-03-05 RX ORDER — FENOFIBRATE 54 MG/1
54 TABLET ORAL DAILY
Status: DISCONTINUED | OUTPATIENT
Start: 2019-03-06 | End: 2019-03-06 | Stop reason: HOSPADM

## 2019-03-05 RX ORDER — ASPIRIN 81 MG/1
81 TABLET ORAL DAILY
Status: DISCONTINUED | OUTPATIENT
Start: 2019-03-06 | End: 2019-03-06 | Stop reason: HOSPADM

## 2019-03-05 RX ORDER — ONDANSETRON 2 MG/ML
4 INJECTION INTRAMUSCULAR; INTRAVENOUS EVERY 6 HOURS PRN
Status: DISCONTINUED | OUTPATIENT
Start: 2019-03-05 | End: 2019-03-06 | Stop reason: HOSPADM

## 2019-03-05 RX ORDER — LEVOTHYROXINE SODIUM 0.1 MG/1
100 TABLET ORAL DAILY
Status: DISCONTINUED | OUTPATIENT
Start: 2019-03-06 | End: 2019-03-06 | Stop reason: HOSPADM

## 2019-03-05 RX ORDER — CLOPIDOGREL BISULFATE 75 MG/1
75 TABLET ORAL DAILY
Status: DISCONTINUED | OUTPATIENT
Start: 2019-03-06 | End: 2019-03-06 | Stop reason: HOSPADM

## 2019-03-05 RX ORDER — HEPARIN SODIUM 5000 [USP'U]/ML
5000 INJECTION, SOLUTION INTRAVENOUS; SUBCUTANEOUS 2 TIMES DAILY
Status: DISCONTINUED | OUTPATIENT
Start: 2019-03-05 | End: 2019-03-06 | Stop reason: HOSPADM

## 2019-03-05 RX ADMIN — FAMOTIDINE 20 MG: 20 TABLET ORAL at 18:03

## 2019-03-05 RX ADMIN — CEFTRIAXONE SODIUM 2 G: 2 INJECTION, POWDER, FOR SOLUTION INTRAMUSCULAR; INTRAVENOUS at 13:35

## 2019-03-05 RX ADMIN — NITROGLYCERIN 0.5 INCH: 20 OINTMENT TOPICAL at 17:59

## 2019-03-05 RX ADMIN — Medication 10 ML: at 21:53

## 2019-03-05 RX ADMIN — FUROSEMIDE 40 MG: 40 TABLET ORAL at 21:52

## 2019-03-05 RX ADMIN — HEPARIN SODIUM 5000 UNITS: 5000 INJECTION INTRAVENOUS; SUBCUTANEOUS at 21:52

## 2019-03-05 RX ADMIN — AZITHROMYCIN MONOHYDRATE 500 MG: 500 INJECTION, POWDER, LYOPHILIZED, FOR SOLUTION INTRAVENOUS at 14:32

## 2019-03-05 ASSESSMENT — ENCOUNTER SYMPTOMS
WHEEZING: 0
SHORTNESS OF BREATH: 1
STRIDOR: 0
PHOTOPHOBIA: 0
SORE THROAT: 0
VOMITING: 0
TROUBLE SWALLOWING: 0
ANAL BLEEDING: 0
BLOOD IN STOOL: 0
EYES NEGATIVE: 1
ALLERGIC/IMMUNOLOGIC NEGATIVE: 1
ABDOMINAL PAIN: 0
COUGH: 1
NAUSEA: 0

## 2019-03-05 ASSESSMENT — PAIN SCALES - GENERAL
PAINLEVEL_OUTOF10: 0
PAINLEVEL_OUTOF10: 0

## 2019-03-06 ENCOUNTER — APPOINTMENT (OUTPATIENT)
Dept: GENERAL RADIOLOGY | Age: 84
DRG: 292 | End: 2019-03-06
Payer: MEDICARE

## 2019-03-06 ENCOUNTER — TELEPHONE (OUTPATIENT)
Dept: CARDIOLOGY CLINIC | Age: 84
End: 2019-03-06

## 2019-03-06 VITALS
RESPIRATION RATE: 16 BRPM | OXYGEN SATURATION: 94 % | DIASTOLIC BLOOD PRESSURE: 52 MMHG | HEART RATE: 61 BPM | WEIGHT: 136.47 LBS | TEMPERATURE: 98.4 F | HEIGHT: 67 IN | BODY MASS INDEX: 21.42 KG/M2 | SYSTOLIC BLOOD PRESSURE: 169 MMHG

## 2019-03-06 LAB
A/G RATIO: 0.9 (ref 1.1–2.2)
ALBUMIN SERPL-MCNC: 3.2 G/DL (ref 3.4–5)
ALP BLD-CCNC: 57 U/L (ref 40–129)
ALT SERPL-CCNC: 6 U/L (ref 10–40)
ANION GAP SERPL CALCULATED.3IONS-SCNC: 12 MMOL/L (ref 3–16)
AST SERPL-CCNC: 19 U/L (ref 15–37)
BASOPHILS ABSOLUTE: 0 K/UL (ref 0–0.2)
BASOPHILS RELATIVE PERCENT: 0.4 %
BILIRUB SERPL-MCNC: 0.4 MG/DL (ref 0–1)
BUN BLDV-MCNC: 38 MG/DL (ref 7–20)
CALCIUM SERPL-MCNC: 9.2 MG/DL (ref 8.3–10.6)
CHLORIDE BLD-SCNC: 100 MMOL/L (ref 99–110)
CO2: 30 MMOL/L (ref 21–32)
CREAT SERPL-MCNC: 2.1 MG/DL (ref 0.8–1.3)
EOSINOPHILS ABSOLUTE: 0.2 K/UL (ref 0–0.6)
EOSINOPHILS RELATIVE PERCENT: 3.8 %
GFR AFRICAN AMERICAN: 36
GFR NON-AFRICAN AMERICAN: 30
GLOBULIN: 3.5 G/DL
GLUCOSE BLD-MCNC: 111 MG/DL (ref 70–99)
HCT VFR BLD CALC: 31.8 % (ref 40.5–52.5)
HEMOGLOBIN: 10.6 G/DL (ref 13.5–17.5)
L. PNEUMOPHILA SEROGP 1 UR AG: NORMAL
LYMPHOCYTES ABSOLUTE: 0.7 K/UL (ref 1–5.1)
LYMPHOCYTES RELATIVE PERCENT: 15 %
MCH RBC QN AUTO: 31 PG (ref 26–34)
MCHC RBC AUTO-ENTMCNC: 33.3 G/DL (ref 31–36)
MCV RBC AUTO: 93.1 FL (ref 80–100)
MONOCYTES ABSOLUTE: 0.6 K/UL (ref 0–1.3)
MONOCYTES RELATIVE PERCENT: 11.9 %
NEUTROPHILS ABSOLUTE: 3.4 K/UL (ref 1.7–7.7)
NEUTROPHILS RELATIVE PERCENT: 68.9 %
PDW BLD-RTO: 18.1 % (ref 12.4–15.4)
PLATELET # BLD: 175 K/UL (ref 135–450)
PMV BLD AUTO: 7.1 FL (ref 5–10.5)
POTASSIUM REFLEX MAGNESIUM: 3.7 MMOL/L (ref 3.5–5.1)
RBC # BLD: 3.42 M/UL (ref 4.2–5.9)
SODIUM BLD-SCNC: 142 MMOL/L (ref 136–145)
STREP PNEUMONIAE ANTIGEN, URINE: NORMAL
TOTAL PROTEIN: 6.7 G/DL (ref 6.4–8.2)
WBC # BLD: 5 K/UL (ref 4–11)

## 2019-03-06 PROCEDURE — 2580000003 HC RX 258: Performed by: STUDENT IN AN ORGANIZED HEALTH CARE EDUCATION/TRAINING PROGRAM

## 2019-03-06 PROCEDURE — 97166 OT EVAL MOD COMPLEX 45 MIN: CPT

## 2019-03-06 PROCEDURE — 6370000000 HC RX 637 (ALT 250 FOR IP): Performed by: STUDENT IN AN ORGANIZED HEALTH CARE EDUCATION/TRAINING PROGRAM

## 2019-03-06 PROCEDURE — 94760 N-INVAS EAR/PLS OXIMETRY 1: CPT

## 2019-03-06 PROCEDURE — 99238 HOSP IP/OBS DSCHRG MGMT 30/<: CPT | Performed by: HOSPITALIST

## 2019-03-06 PROCEDURE — 6360000002 HC RX W HCPCS: Performed by: STUDENT IN AN ORGANIZED HEALTH CARE EDUCATION/TRAINING PROGRAM

## 2019-03-06 PROCEDURE — 97535 SELF CARE MNGMENT TRAINING: CPT

## 2019-03-06 PROCEDURE — 80053 COMPREHEN METABOLIC PANEL: CPT

## 2019-03-06 PROCEDURE — 85025 COMPLETE CBC W/AUTO DIFF WBC: CPT

## 2019-03-06 PROCEDURE — 71045 X-RAY EXAM CHEST 1 VIEW: CPT

## 2019-03-06 PROCEDURE — 36415 COLL VENOUS BLD VENIPUNCTURE: CPT

## 2019-03-06 RX ORDER — HYDRALAZINE HYDROCHLORIDE 20 MG/ML
10 INJECTION INTRAMUSCULAR; INTRAVENOUS EVERY 6 HOURS PRN
Status: DISCONTINUED | OUTPATIENT
Start: 2019-03-06 | End: 2019-03-06 | Stop reason: HOSPADM

## 2019-03-06 RX ADMIN — ALLOPURINOL 100 MG: 100 TABLET ORAL at 10:05

## 2019-03-06 RX ADMIN — FAMOTIDINE 20 MG: 20 TABLET ORAL at 10:05

## 2019-03-06 RX ADMIN — NITROGLYCERIN 0.5 INCH: 20 OINTMENT TOPICAL at 12:23

## 2019-03-06 RX ADMIN — FENOFIBRATE 54 MG: 54 TABLET ORAL at 10:05

## 2019-03-06 RX ADMIN — NITROGLYCERIN 0.5 INCH: 20 OINTMENT TOPICAL at 01:04

## 2019-03-06 RX ADMIN — CLOPIDOGREL 75 MG: 75 TABLET, FILM COATED ORAL at 10:05

## 2019-03-06 RX ADMIN — LEVOTHYROXINE SODIUM 100 MCG: 100 TABLET ORAL at 10:05

## 2019-03-06 RX ADMIN — ATORVASTATIN CALCIUM 40 MG: 40 TABLET, FILM COATED ORAL at 10:05

## 2019-03-06 RX ADMIN — ESCITALOPRAM 5 MG: 5 TABLET, FILM COATED ORAL at 10:59

## 2019-03-06 RX ADMIN — DILTIAZEM HYDROCHLORIDE 120 MG: 120 CAPSULE, COATED, EXTENDED RELEASE ORAL at 10:05

## 2019-03-06 RX ADMIN — HEPARIN SODIUM 5000 UNITS: 5000 INJECTION INTRAVENOUS; SUBCUTANEOUS at 10:10

## 2019-03-06 RX ADMIN — ASPIRIN 81 MG: 81 TABLET ORAL at 10:05

## 2019-03-06 RX ADMIN — FUROSEMIDE 40 MG: 40 TABLET ORAL at 10:05

## 2019-03-06 RX ADMIN — NITROGLYCERIN 0.5 INCH: 20 OINTMENT TOPICAL at 06:02

## 2019-03-06 RX ADMIN — Medication 10 ML: at 10:04

## 2019-03-06 ASSESSMENT — PAIN SCALES - GENERAL
PAINLEVEL_OUTOF10: 0

## 2019-03-07 ENCOUNTER — CARE COORDINATION (OUTPATIENT)
Dept: CASE MANAGEMENT | Age: 84
End: 2019-03-07

## 2019-03-07 DIAGNOSIS — J18.9 PNEUMONIA DUE TO INFECTIOUS ORGANISM, UNSPECIFIED LATERALITY, UNSPECIFIED PART OF LUNG: Primary | ICD-10-CM

## 2019-03-07 LAB — Lab: 1.4 MMOL/L

## 2019-03-07 PROCEDURE — 1111F DSCHRG MED/CURRENT MED MERGE: CPT | Performed by: INTERNAL MEDICINE

## 2019-03-08 LAB
BODY FLUID CULTURE, STERILE: NORMAL
GRAM STAIN RESULT: NORMAL

## 2019-03-08 RX ORDER — ASPIRIN 81 MG/1
81 TABLET, COATED ORAL DAILY
Qty: 90 TABLET | Refills: 1 | Status: ON HOLD | OUTPATIENT
Start: 2019-03-08 | End: 2019-01-01 | Stop reason: HOSPADM

## 2019-03-10 LAB
BLOOD CULTURE, ROUTINE: NORMAL
CULTURE, BLOOD 2: NORMAL

## 2019-03-18 RX ORDER — FAMOTIDINE 20 MG/1
TABLET, FILM COATED ORAL
Qty: 30 TABLET | Refills: 2 | Status: ON HOLD | OUTPATIENT
Start: 2019-03-18 | End: 2019-01-01 | Stop reason: HOSPADM

## 2019-03-20 ENCOUNTER — CARE COORDINATION (OUTPATIENT)
Dept: CARE COORDINATION | Age: 84
End: 2019-03-20

## 2019-03-20 DIAGNOSIS — F41.9 ANXIETY: Primary | ICD-10-CM

## 2019-03-20 DIAGNOSIS — J93.9 PNEUMOTHORAX, UNSPECIFIED TYPE: Primary | ICD-10-CM

## 2019-03-20 RX ORDER — ALPRAZOLAM 0.25 MG/1
0.25 TABLET ORAL DAILY PRN
Qty: 30 TABLET | Refills: 1 | OUTPATIENT
Start: 2019-03-20 | End: 2019-01-01 | Stop reason: SDUPTHER

## 2019-03-20 NOTE — CARE COORDINATION
Ambulatory Care Coordination Note  3/20/2019  CM Risk Score: 3  Estuardo Mortality Risk Score: 51    ACC: Cathleen Jeffers RN     Hx: CAD, HTN, HLD, CKD Stage 4, A-Fib, V-Tach, AICD, CHF, Hypothroidism, Anemia, [place]    Summary Note: The pt presented to [facility]. on 3/5/19 complaining of progressive shortness of breath. The patient has a known right-sided pleural effusion that appeared to have increased in size from prior. Pulmonology was consulted upon admission and recommended diagnostic and therapeutic thoracentesis. The procedure was done and revealed a transudative fluid suggestive of CHF exacerbation. Unfortunately, the procedure was complicated by a small pneumothorax. [facility] explained to the pt's wife the hospital physician had instructed the pt to follow up in 1 week with  PCP and to have a repeat chest-x-ray. The [facility] assisted the wife in making an appointment. for the pt. Wife instructed to call with any change in the pt's condition. The wife stated the pt sleeps a lot but he does take a walk every day. The wife stated the pt has been feeling good and did drive to the store. Plan:  The pt will follow up with the PCP on 3/25/19 at 3:40 PM.  Wife instructed to call if the pt experiences any SOB. The pt will continue to walk for exercise. Continue on his current medications. Care Coordination Interventions    Program Enrollment:  Complex Care  Referral from Primary Care Provider:  Yes  Suggested Interventions and Community Resources  Fall Risk Prevention: In Process  Medi Set or Pill Pack:  Completed  Pharmacist:  Declined  Specialty Services Referral:  Completed (Comment: Dr. Srinivas Myrick for AICD)  Other Services:  Completed (Comment: Assistance by [facility] Staff)  Zone Management Tools:   In Process (Comment: Heart Failure zones mailed to pt.)  Other Services or Interventions:  Post Hospitalization Appt made         Goals Addressed     None          Prior to Admission medications MD Michelle       Future Appointments   Date Time Provider Gwen Perez   3/25/2019  3:40 PM Annetta Gitelman, MD [facility] [address] [facility]   3/26/2019  3:55 AM Tj Schneider PHONE TRANSMISSION Cruzito Garner [facility]   5/16/2019 10:00 AM Annetta Gitelman, MD [facility] [address] [facility]   6/4/2019 12:45 PM Jaylen Baird MD [facility]     ,   Congestive Heart Failure Assessment    Are you currently restricting fluids?:  No Restriction  Do you understand a low sodium diet?:  Yes  Do you understand how to read food labels?:  Yes  How many restaurant meals do you eat per week?:  0  Do you salt your food before tasting it?:  No     No patient-reported symptoms      Symptoms:   None:  Yes      Symptom course:  stable  Weight trend:  stable  Salt intake watch compared to last visit:  stable      and   General Assessment    Do you have any symptoms that are causing concern?:  No

## 2019-03-25 ENCOUNTER — OFFICE VISIT (OUTPATIENT)
Dept: INTERNAL MEDICINE CLINIC | Age: 84
End: 2019-03-25
Payer: MEDICARE

## 2019-03-25 VITALS
HEIGHT: 67 IN | WEIGHT: 131.6 LBS | OXYGEN SATURATION: 97 % | BODY MASS INDEX: 20.65 KG/M2 | RESPIRATION RATE: 12 BRPM | DIASTOLIC BLOOD PRESSURE: 60 MMHG | SYSTOLIC BLOOD PRESSURE: 130 MMHG | HEART RATE: 61 BPM

## 2019-03-25 DIAGNOSIS — I10 ESSENTIAL HYPERTENSION: ICD-10-CM

## 2019-03-25 DIAGNOSIS — I50.32 CHRONIC DIASTOLIC CONGESTIVE HEART FAILURE (HCC): Primary | ICD-10-CM

## 2019-03-25 PROCEDURE — G8420 CALC BMI NORM PARAMETERS: HCPCS | Performed by: INTERNAL MEDICINE

## 2019-03-25 PROCEDURE — 1101F PT FALLS ASSESS-DOCD LE1/YR: CPT | Performed by: INTERNAL MEDICINE

## 2019-03-25 PROCEDURE — 4040F PNEUMOC VAC/ADMIN/RCVD: CPT | Performed by: INTERNAL MEDICINE

## 2019-03-25 PROCEDURE — 99213 OFFICE O/P EST LOW 20 MIN: CPT | Performed by: INTERNAL MEDICINE

## 2019-03-25 PROCEDURE — G8482 FLU IMMUNIZE ORDER/ADMIN: HCPCS | Performed by: INTERNAL MEDICINE

## 2019-03-25 PROCEDURE — G8427 DOCREV CUR MEDS BY ELIG CLIN: HCPCS | Performed by: INTERNAL MEDICINE

## 2019-03-25 PROCEDURE — 1036F TOBACCO NON-USER: CPT | Performed by: INTERNAL MEDICINE

## 2019-03-25 PROCEDURE — 1111F DSCHRG MED/CURRENT MED MERGE: CPT | Performed by: INTERNAL MEDICINE

## 2019-03-25 PROCEDURE — G8598 ASA/ANTIPLAT THER USED: HCPCS | Performed by: INTERNAL MEDICINE

## 2019-03-25 PROCEDURE — 1123F ACP DISCUSS/DSCN MKR DOCD: CPT | Performed by: INTERNAL MEDICINE

## 2019-03-25 ASSESSMENT — ENCOUNTER SYMPTOMS
BACK PAIN: 0
ABDOMINAL PAIN: 0
SHORTNESS OF BREATH: 1
EYE REDNESS: 0
NAUSEA: 0
CHEST TIGHTNESS: 0
COUGH: 1

## 2019-03-25 ASSESSMENT — PATIENT HEALTH QUESTIONNAIRE - PHQ9
1. LITTLE INTEREST OR PLEASURE IN DOING THINGS: 0
2. FEELING DOWN, DEPRESSED OR HOPELESS: 0
SUM OF ALL RESPONSES TO PHQ9 QUESTIONS 1 & 2: 0
SUM OF ALL RESPONSES TO PHQ QUESTIONS 1-9: 0
SUM OF ALL RESPONSES TO PHQ QUESTIONS 1-9: 0

## 2019-03-27 ENCOUNTER — TELEPHONE (OUTPATIENT)
Dept: CARDIOLOGY CLINIC | Age: 84
End: 2019-03-27

## 2019-04-02 RX ORDER — FENOFIBRATE 54 MG/1
TABLET ORAL
Qty: 30 TABLET | Refills: 1 | Status: SHIPPED | OUTPATIENT
Start: 2019-04-02 | End: 2019-04-02 | Stop reason: SDUPTHER

## 2019-04-02 RX ORDER — FENOFIBRATE 54 MG/1
TABLET ORAL
Qty: 90 TABLET | Refills: 1 | Status: SHIPPED | OUTPATIENT
Start: 2019-04-02 | End: 2019-01-01 | Stop reason: SDUPTHER

## 2019-04-05 ENCOUNTER — NURSE ONLY (OUTPATIENT)
Dept: CARDIOLOGY CLINIC | Age: 84
End: 2019-04-05
Payer: MEDICARE

## 2019-04-05 DIAGNOSIS — Z95.810 AUTOMATIC IMPLANTABLE CARDIOVERTER-DEFIBRILLATOR IN SITU: ICD-10-CM

## 2019-04-05 DIAGNOSIS — I47.29 PAROXYSMAL VENTRICULAR TACHYCARDIA: ICD-10-CM

## 2019-04-05 PROCEDURE — 93283 PRGRMG EVAL IMPLANTABLE DFB: CPT | Performed by: INTERNAL MEDICINE

## 2019-04-05 NOTE — PROGRESS NOTES
Interrogation and programming evaluation of the device shows normal function, with stable sensing and pacing thresholds. See interrogation for details. Review with Dr Truong Rodriguez. Recheck 3 mos.

## 2019-04-11 RX ORDER — ESCITALOPRAM OXALATE 5 MG/1
TABLET ORAL
Qty: 90 TABLET | Refills: 0 | Status: SHIPPED | OUTPATIENT
Start: 2019-04-11 | End: 2019-01-01 | Stop reason: SDUPTHER

## 2019-04-12 ENCOUNTER — HOSPITAL ENCOUNTER (INPATIENT)
Age: 84
LOS: 6 days | Discharge: HOME HEALTH | DRG: 291 | End: 2019-04-18
Attending: EMERGENCY MEDICINE | Admitting: HOSPITALIST
Payer: MEDICARE

## 2019-04-12 ENCOUNTER — APPOINTMENT (OUTPATIENT)
Dept: CT IMAGING | Age: 84
DRG: 291 | End: 2019-04-12
Payer: MEDICARE

## 2019-04-12 ENCOUNTER — APPOINTMENT (OUTPATIENT)
Dept: GENERAL RADIOLOGY | Age: 84
DRG: 291 | End: 2019-04-12
Payer: MEDICARE

## 2019-04-12 DIAGNOSIS — J90 PLEURAL EFFUSION ON RIGHT: Primary | ICD-10-CM

## 2019-04-12 DIAGNOSIS — I50.9 ACUTE ON CHRONIC CONGESTIVE HEART FAILURE, UNSPECIFIED HEART FAILURE TYPE (HCC): ICD-10-CM

## 2019-04-12 PROBLEM — J94.8 HYDROPNEUMOTHORAX: Status: ACTIVE | Noted: 2019-04-12

## 2019-04-12 LAB
ANION GAP SERPL CALCULATED.3IONS-SCNC: 11 MMOL/L (ref 3–16)
ANION GAP SERPL CALCULATED.3IONS-SCNC: 13 MMOL/L (ref 3–16)
BASE EXCESS VENOUS: 7 (ref -3–3)
BASOPHILS ABSOLUTE: 0 K/UL (ref 0–0.2)
BASOPHILS RELATIVE PERCENT: 0.7 %
BILIRUBIN URINE: NEGATIVE
BLOOD, URINE: NEGATIVE
BUN BLDV-MCNC: 46 MG/DL (ref 7–20)
BUN BLDV-MCNC: 47 MG/DL (ref 7–20)
CALCIUM SERPL-MCNC: 8.8 MG/DL (ref 8.3–10.6)
CALCIUM SERPL-MCNC: 9 MG/DL (ref 8.3–10.6)
CHLORIDE BLD-SCNC: 96 MMOL/L (ref 99–110)
CHLORIDE BLD-SCNC: 97 MMOL/L (ref 99–110)
CLARITY: CLEAR
CO2: 28 MMOL/L (ref 21–32)
CO2: 28 MMOL/L (ref 21–32)
COLOR: YELLOW
CREAT SERPL-MCNC: 2.2 MG/DL (ref 0.8–1.3)
CREAT SERPL-MCNC: 2.3 MG/DL (ref 0.8–1.3)
EKG ATRIAL RATE: 89 BPM
EKG DIAGNOSIS: NORMAL
EKG Q-T INTERVAL: 478 MS
EKG QRS DURATION: 198 MS
EKG QTC CALCULATION (BAZETT): 610 MS
EKG R AXIS: -85 DEGREES
EKG T AXIS: 95 DEGREES
EKG VENTRICULAR RATE: 98 BPM
EOSINOPHILS ABSOLUTE: 0.2 K/UL (ref 0–0.6)
EOSINOPHILS RELATIVE PERCENT: 4.9 %
GFR AFRICAN AMERICAN: 32
GFR AFRICAN AMERICAN: 34
GFR NON-AFRICAN AMERICAN: 27
GFR NON-AFRICAN AMERICAN: 28
GLUCOSE BLD-MCNC: 85 MG/DL (ref 70–99)
GLUCOSE BLD-MCNC: 99 MG/DL (ref 70–99)
GLUCOSE URINE: NEGATIVE MG/DL
HCO3 VENOUS: 30.6 MMOL/L (ref 23–29)
HCT VFR BLD CALC: 29.6 % (ref 40.5–52.5)
HEMOGLOBIN: 9.7 G/DL (ref 13.5–17.5)
INR BLD: 1.18 (ref 0.86–1.14)
KETONES, URINE: NEGATIVE MG/DL
LACTATE: 0.85 MMOL/L (ref 0.4–2)
LEUKOCYTE ESTERASE, URINE: NEGATIVE
LYMPHOCYTES ABSOLUTE: 0.8 K/UL (ref 1–5.1)
LYMPHOCYTES RELATIVE PERCENT: 17.2 %
MCH RBC QN AUTO: 30.4 PG (ref 26–34)
MCHC RBC AUTO-ENTMCNC: 32.6 G/DL (ref 31–36)
MCV RBC AUTO: 93.3 FL (ref 80–100)
MICROSCOPIC EXAMINATION: NORMAL
MONOCYTES ABSOLUTE: 0.6 K/UL (ref 0–1.3)
MONOCYTES RELATIVE PERCENT: 12.4 %
NEUTROPHILS ABSOLUTE: 3.1 K/UL (ref 1.7–7.7)
NEUTROPHILS RELATIVE PERCENT: 64.8 %
NITRITE, URINE: NEGATIVE
O2 SAT, VEN: 51 %
PCO2, VEN: 43.2 MM HG (ref 40–50)
PDW BLD-RTO: 17.8 % (ref 12.4–15.4)
PERFORMED ON: ABNORMAL
PH UA: 6.5 (ref 5–8)
PH VENOUS: 7.46 (ref 7.35–7.45)
PLATELET # BLD: 172 K/UL (ref 135–450)
PMV BLD AUTO: 7.4 FL (ref 5–10.5)
PO2, VEN: 26 MM HG
POC SAMPLE TYPE: ABNORMAL
POTASSIUM REFLEX MAGNESIUM: 4 MMOL/L (ref 3.5–5.1)
POTASSIUM REFLEX MAGNESIUM: 5.8 MMOL/L (ref 3.5–5.1)
PRO-BNP: ABNORMAL PG/ML (ref 0–449)
PROCALCITONIN: 0.08 NG/ML (ref 0–0.15)
PROTEIN UA: NEGATIVE MG/DL
PROTHROMBIN TIME: 13.5 SEC (ref 9.8–13)
RAPID INFLUENZA  B AGN: NEGATIVE
RAPID INFLUENZA A AGN: NEGATIVE
RBC # BLD: 3.18 M/UL (ref 4.2–5.9)
SODIUM BLD-SCNC: 136 MMOL/L (ref 136–145)
SODIUM BLD-SCNC: 137 MMOL/L (ref 136–145)
SPECIFIC GRAVITY UA: 1.01 (ref 1–1.03)
T4 FREE: 1.4 NG/DL (ref 0.9–1.8)
TCO2 CALC VENOUS: 32 MMOL/L
TROPONIN: 0.03 NG/ML
TROPONIN: 0.03 NG/ML
TROPONIN: 0.04 NG/ML
TSH SERPL DL<=0.05 MIU/L-ACNC: 3.18 UIU/ML (ref 0.27–4.2)
URINE TYPE: NORMAL
UROBILINOGEN, URINE: 0.2 E.U./DL
WBC # BLD: 4.7 K/UL (ref 4–11)

## 2019-04-12 PROCEDURE — 84145 PROCALCITONIN (PCT): CPT

## 2019-04-12 PROCEDURE — 6370000000 HC RX 637 (ALT 250 FOR IP): Performed by: STUDENT IN AN ORGANIZED HEALTH CARE EDUCATION/TRAINING PROGRAM

## 2019-04-12 PROCEDURE — 99285 EMERGENCY DEPT VISIT HI MDM: CPT

## 2019-04-12 PROCEDURE — 6360000002 HC RX W HCPCS: Performed by: STUDENT IN AN ORGANIZED HEALTH CARE EDUCATION/TRAINING PROGRAM

## 2019-04-12 PROCEDURE — 87633 RESP VIRUS 12-25 TARGETS: CPT

## 2019-04-12 PROCEDURE — 99222 1ST HOSP IP/OBS MODERATE 55: CPT | Performed by: HOSPITALIST

## 2019-04-12 PROCEDURE — 2060000000 HC ICU INTERMEDIATE R&B

## 2019-04-12 PROCEDURE — 71250 CT THORAX DX C-: CPT

## 2019-04-12 PROCEDURE — 85610 PROTHROMBIN TIME: CPT

## 2019-04-12 PROCEDURE — 1200000000 HC SEMI PRIVATE

## 2019-04-12 PROCEDURE — 71045 X-RAY EXAM CHEST 1 VIEW: CPT

## 2019-04-12 PROCEDURE — 87581 M.PNEUMON DNA AMP PROBE: CPT

## 2019-04-12 PROCEDURE — 94761 N-INVAS EAR/PLS OXIMETRY MLT: CPT

## 2019-04-12 PROCEDURE — 36415 COLL VENOUS BLD VENIPUNCTURE: CPT

## 2019-04-12 PROCEDURE — 2580000003 HC RX 258: Performed by: STUDENT IN AN ORGANIZED HEALTH CARE EDUCATION/TRAINING PROGRAM

## 2019-04-12 PROCEDURE — 84484 ASSAY OF TROPONIN QUANT: CPT

## 2019-04-12 PROCEDURE — 93005 ELECTROCARDIOGRAM TRACING: CPT | Performed by: EMERGENCY MEDICINE

## 2019-04-12 PROCEDURE — 82803 BLOOD GASES ANY COMBINATION: CPT

## 2019-04-12 PROCEDURE — 84439 ASSAY OF FREE THYROXINE: CPT

## 2019-04-12 PROCEDURE — 83880 ASSAY OF NATRIURETIC PEPTIDE: CPT

## 2019-04-12 PROCEDURE — 87804 INFLUENZA ASSAY W/OPTIC: CPT

## 2019-04-12 PROCEDURE — 83605 ASSAY OF LACTIC ACID: CPT

## 2019-04-12 PROCEDURE — 84443 ASSAY THYROID STIM HORMONE: CPT

## 2019-04-12 PROCEDURE — 87798 DETECT AGENT NOS DNA AMP: CPT

## 2019-04-12 PROCEDURE — 85025 COMPLETE CBC W/AUTO DIFF WBC: CPT

## 2019-04-12 PROCEDURE — 89220 SPUTUM SPECIMEN COLLECTION: CPT

## 2019-04-12 PROCEDURE — 81003 URINALYSIS AUTO W/O SCOPE: CPT

## 2019-04-12 PROCEDURE — 80048 BASIC METABOLIC PNL TOTAL CA: CPT

## 2019-04-12 PROCEDURE — 87040 BLOOD CULTURE FOR BACTERIA: CPT

## 2019-04-12 PROCEDURE — 87486 CHLMYD PNEUM DNA AMP PROBE: CPT

## 2019-04-12 RX ORDER — METOPROLOL SUCCINATE 100 MG/1
100 TABLET, EXTENDED RELEASE ORAL DAILY
Status: DISCONTINUED | OUTPATIENT
Start: 2019-04-12 | End: 2019-01-01 | Stop reason: HOSPADM

## 2019-04-12 RX ORDER — FENOFIBRATE 54 MG/1
54 TABLET ORAL DAILY
Status: DISCONTINUED | OUTPATIENT
Start: 2019-04-12 | End: 2019-01-01 | Stop reason: HOSPADM

## 2019-04-12 RX ORDER — ATORVASTATIN CALCIUM 40 MG/1
40 TABLET, FILM COATED ORAL EVERY MORNING
Status: DISCONTINUED | OUTPATIENT
Start: 2019-04-13 | End: 2019-01-01 | Stop reason: HOSPADM

## 2019-04-12 RX ORDER — ALLOPURINOL 100 MG/1
100 TABLET ORAL DAILY
Status: DISCONTINUED | OUTPATIENT
Start: 2019-04-12 | End: 2019-01-01 | Stop reason: HOSPADM

## 2019-04-12 RX ORDER — M-VIT,TX,IRON,MINS/CALC/FOLIC 27MG-0.4MG
1 TABLET ORAL DAILY
Status: DISCONTINUED | OUTPATIENT
Start: 2019-04-12 | End: 2019-01-01 | Stop reason: HOSPADM

## 2019-04-12 RX ORDER — HEPARIN SODIUM 5000 [USP'U]/ML
5000 INJECTION, SOLUTION INTRAVENOUS; SUBCUTANEOUS EVERY 8 HOURS SCHEDULED
Status: DISCONTINUED | OUTPATIENT
Start: 2019-04-12 | End: 2019-04-15

## 2019-04-12 RX ORDER — FLUTICASONE PROPIONATE 50 MCG
2 SPRAY, SUSPENSION (ML) NASAL DAILY
Status: DISCONTINUED | OUTPATIENT
Start: 2019-04-12 | End: 2019-01-01 | Stop reason: HOSPADM

## 2019-04-12 RX ORDER — FUROSEMIDE 10 MG/ML
40 INJECTION INTRAMUSCULAR; INTRAVENOUS 2 TIMES DAILY
Status: DISCONTINUED | OUTPATIENT
Start: 2019-04-12 | End: 2019-04-15

## 2019-04-12 RX ORDER — ASPIRIN 81 MG/1
81 TABLET ORAL DAILY
Status: DISCONTINUED | OUTPATIENT
Start: 2019-04-12 | End: 2019-01-01 | Stop reason: HOSPADM

## 2019-04-12 RX ORDER — UREA 10 %
5 LOTION (ML) TOPICAL NIGHTLY PRN
Status: DISCONTINUED | OUTPATIENT
Start: 2019-04-12 | End: 2019-01-01 | Stop reason: HOSPADM

## 2019-04-12 RX ORDER — ESCITALOPRAM OXALATE 10 MG/1
5 TABLET ORAL DAILY
Status: DISCONTINUED | OUTPATIENT
Start: 2019-04-12 | End: 2019-01-01 | Stop reason: HOSPADM

## 2019-04-12 RX ORDER — ACETAMINOPHEN 325 MG/1
650 TABLET ORAL EVERY 6 HOURS PRN
Status: DISCONTINUED | OUTPATIENT
Start: 2019-04-12 | End: 2019-01-01 | Stop reason: HOSPADM

## 2019-04-12 RX ORDER — FAMOTIDINE 20 MG/1
20 TABLET, FILM COATED ORAL DAILY
Status: DISCONTINUED | OUTPATIENT
Start: 2019-04-12 | End: 2019-01-01 | Stop reason: HOSPADM

## 2019-04-12 RX ORDER — ALPRAZOLAM 0.25 MG/1
0.25 TABLET ORAL DAILY PRN
Status: DISCONTINUED | OUTPATIENT
Start: 2019-04-12 | End: 2019-01-01 | Stop reason: HOSPADM

## 2019-04-12 RX ORDER — SODIUM CHLORIDE 0.9 % (FLUSH) 0.9 %
10 SYRINGE (ML) INJECTION PRN
Status: DISCONTINUED | OUTPATIENT
Start: 2019-04-12 | End: 2019-01-01 | Stop reason: HOSPADM

## 2019-04-12 RX ORDER — LEVOTHYROXINE SODIUM 0.1 MG/1
100 TABLET ORAL DAILY
Status: DISCONTINUED | OUTPATIENT
Start: 2019-04-12 | End: 2019-01-01 | Stop reason: HOSPADM

## 2019-04-12 RX ORDER — ONDANSETRON 2 MG/ML
4 INJECTION INTRAMUSCULAR; INTRAVENOUS EVERY 6 HOURS PRN
Status: DISCONTINUED | OUTPATIENT
Start: 2019-04-12 | End: 2019-01-01 | Stop reason: HOSPADM

## 2019-04-12 RX ORDER — SODIUM CHLORIDE 0.9 % (FLUSH) 0.9 %
10 SYRINGE (ML) INJECTION EVERY 12 HOURS SCHEDULED
Status: DISCONTINUED | OUTPATIENT
Start: 2019-04-12 | End: 2019-01-01 | Stop reason: HOSPADM

## 2019-04-12 RX ORDER — DILTIAZEM HYDROCHLORIDE 120 MG/1
120 CAPSULE, COATED, EXTENDED RELEASE ORAL DAILY
Status: DISCONTINUED | OUTPATIENT
Start: 2019-04-12 | End: 2019-01-01 | Stop reason: HOSPADM

## 2019-04-12 RX ADMIN — LEVOTHYROXINE SODIUM 100 MCG: 100 TABLET ORAL at 17:18

## 2019-04-12 RX ADMIN — ASPIRIN 81 MG: 81 TABLET, COATED ORAL at 17:20

## 2019-04-12 RX ADMIN — Medication 10 ML: at 21:58

## 2019-04-12 RX ADMIN — MULTIPLE VITAMINS W/ MINERALS TAB 1 TABLET: TAB at 17:19

## 2019-04-12 RX ADMIN — FENOFIBRATE 54 MG: 54 TABLET ORAL at 17:19

## 2019-04-12 RX ADMIN — DILTIAZEM HYDROCHLORIDE 120 MG: 120 CAPSULE, COATED, EXTENDED RELEASE ORAL at 17:19

## 2019-04-12 RX ADMIN — HEPARIN SODIUM 5000 UNITS: 5000 INJECTION INTRAVENOUS; SUBCUTANEOUS at 21:58

## 2019-04-12 RX ADMIN — METOPROLOL SUCCINATE 100 MG: 100 TABLET, EXTENDED RELEASE ORAL at 17:19

## 2019-04-12 RX ADMIN — ESCITALOPRAM OXALATE 5 MG: 10 TABLET, FILM COATED ORAL at 17:20

## 2019-04-12 RX ADMIN — FUROSEMIDE 40 MG: 10 INJECTION, SOLUTION INTRAMUSCULAR; INTRAVENOUS at 16:27

## 2019-04-12 RX ADMIN — HEPARIN SODIUM 5000 UNITS: 5000 INJECTION INTRAVENOUS; SUBCUTANEOUS at 16:27

## 2019-04-12 RX ADMIN — FUROSEMIDE 40 MG: 10 INJECTION, SOLUTION INTRAMUSCULAR; INTRAVENOUS at 21:58

## 2019-04-12 RX ADMIN — ALLOPURINOL 100 MG: 100 TABLET ORAL at 17:19

## 2019-04-12 RX ADMIN — VITAMIN D, TAB 1000IU (100/BT) 1000 UNITS: 25 TAB at 17:20

## 2019-04-12 RX ADMIN — PIPERACILLIN AND TAZOBACTAM 3.38 G: 3; .375 INJECTION, POWDER, FOR SOLUTION INTRAVENOUS at 16:15

## 2019-04-12 RX ADMIN — FAMOTIDINE 20 MG: 20 TABLET ORAL at 16:27

## 2019-04-12 ASSESSMENT — ENCOUNTER SYMPTOMS
SHORTNESS OF BREATH: 1
ALLERGIC/IMMUNOLOGIC NEGATIVE: 1
ABDOMINAL PAIN: 0
EYES NEGATIVE: 1
VOICE CHANGE: 0
SORE THROAT: 0
WHEEZING: 0
APNEA: 0
COUGH: 1
STRIDOR: 0
GASTROINTESTINAL NEGATIVE: 1
CHEST TIGHTNESS: 0
CHOKING: 0
ABDOMINAL DISTENTION: 0

## 2019-04-12 ASSESSMENT — PAIN SCALES - GENERAL
PAINLEVEL_OUTOF10: 0
PAINLEVEL_OUTOF10: 0

## 2019-04-12 NOTE — H&P
Internal Medicine  PGY 2  History & Physical      CC dyspnea    History Obtained From:  patient    HISTORY OF PRESENT ILLNESS:  Yvette Desouza is a 81 yo male with hx of CHF, CAD s/p CABG and defib placement, HTN, HLD, hypothyroidism, Afib, CKD who presents with dyspnea on exertion. Patient had recent (3/2019) admission due to large right pleural effusion and he had thoracentesis that relieved dyspnea at that time. He was discharged next day and he was doing well without SOB for a week or two. This time he complains of worsening dyspnea for several weeks. He becomes easily short of breath with getting up and ambulating. He has mild cough that brings up mild phlegm. Denies chest pain, hemoptysis, fever, chill, leg swelling, or nausea. He was a former smoker and quit smoking 30 years ago. Past Medical History:        Diagnosis Date    Anemia     Anxiety     Atrial fibrillation ([address]     CAD (coronary artery disease)     Cardiologist  [address] [place] [address] [facility]    CHF (congestive heart failure) (HCC)     Chronic renal insufficiency     Gout     History of BPH     Hyperlipidemia     Hypertension     Hypothyroidism     hypothyroid       Past Surgical History:        Procedure Laterality Date    CARDIAC DEFIBRILLATOR PLACEMENT      left upper subclavian replaced about 3 yrs ago per Dr. William Guardado  2004    resection/ stents    [address]  2008    AICD replaced    SHOULDER SURGERY      cuff surgery       Medications Priorto Admission:    Medications Prior to Admission: escitalopram (LEXAPRO) 5 MG tablet, TAKE 1 TABLET BY MOUTH DAILY TO HELP ANXIETY  fenofibrate (TRICOR) 54 MG tablet, TAKE 1 TABLET BY MOUTH EVERY DAY  ALPRAZolam (XANAX) 0.25 MG tablet, Take 1 tablet by mouth daily as needed for Sleep for up to 60 days.   famotidine (PEPCID) 20 MG tablet, TAKE 1 TABLET BY MOUTH DAILY AS NEEDED FOR REFLUX  ASPIRIN LOW DOSE 81 MG EC tablet, TAKE 1 TABLET BY MOUTH DAILY  vitamin D (CHOLECALCIFEROL) 1000 UNIT TABS tablet, Take 1,000 Units by mouth daily  clopidogrel (PLAVIX) 75 MG tablet, TAKE 1 TABLET BY MOUTH DAILY  allopurinol (ZYLOPRIM) 100 MG tablet, TAKE 1 TABLET BY MOUTH DAILY  Multiple Vitamins-Minerals (CENTRUM SILVER) TABS, TAKE 1 TABLET BY MOUTH DAILY  levothyroxine (SYNTHROID) 100 MCG tablet, TAKE 1 TABLET BY MOUTH DAILY  atorvastatin (LIPITOR) 40 MG tablet, TAKE 1 TABLET BY MOUTH EVERY MORNING  metoprolol succinate (TOPROL XL) 100 MG extended release tablet, Take 1 tablet by mouth daily  furosemide (LASIX) 40 MG tablet, Take 1 tablet by mouth 2 times daily  diltiazem (CARDIZEM CD) 120 MG extended release capsule, TAKE 1 CAP BY MOUTH DAILY (DX: _________)  famotidine (PEPCID) 20 MG tablet, TAKE 1 TABLET BY MOUTH DAILY AS NEEDED FOR REFLUX  Acetaminophen (APAP) 325 MG TABS, Take 650 mg by mouth every 6 hours as needed for Pain or Fever  Melatonin 5 MG CAPS, TAKE 1 CAPSULE BY MOUTH NIGHTLY AS NEEDED FOR SLEEP  fluticasone (FLONASE) 50 MCG/ACT nasal spray, USE 2SPRAYS BY NASAL ROUTE DAILY    Allergies:  Lactose    Social History:   · TOBACCO:   reports that he has quit smoking. He has never used smokeless tobacco.  · ETOH:   reports that he drinks about 3.6 oz of alcohol per week. · DRUGS : none  · Patient currently lives at home  ·   Family History:       Problem Relation Age of Onset    Dementia Mother     Emphysema Father     Other Father         heart ?  Other Brother         leukemia&, AICD       Review of Systems   Constitutional: Positive for activity change. Negative for appetite change, chills, diaphoresis, fatigue and fever. HENT: Negative for congestion, sore throat and voice change. Respiratory: Positive for cough and shortness of breath. Negative for apnea, choking, chest tightness, wheezing and stridor. Cardiovascular: Negative for chest pain, palpitations and leg swelling.    Gastrointestinal: Negative for abdominal distention and abdominal pain. Endocrine: Negative for cold intolerance. Genitourinary: Negative for difficulty urinating and dysuria. Neurological: Negative for dizziness, seizures, facial asymmetry, light-headedness, numbness and headaches. Physical Exam   Constitutional: He is oriented to person, place, and time. No distress. HENT:   Head: Normocephalic. Eyes: Pupils are equal, round, and reactive to light. Neck: Normal range of motion. No JVD present. Cardiovascular: Normal rate and regular rhythm. Pulmonary/Chest: Effort normal. No respiratory distress. He has no wheezes. He exhibits no tenderness. Crackles heard on RLL worse than other lobes   Abdominal: Soft. Bowel sounds are normal. He exhibits no distension. Musculoskeletal: Normal range of motion. He exhibits no edema. Neurological: He is alert and oriented to person, place, and time. No cranial nerve deficit. Coordination normal.   Skin: Skin is warm. He is not diaphoretic. No erythema. No pallor. Physical exam:       Vitals:    04/12/19 1403   BP: (!) 154/83   Pulse: 100   Resp: 18   Temp: 97 °F (36.1 °C)   SpO2: 97%       DATA:    Labs:  CBC:   Recent Labs     04/12/19  0937   WBC 4.7   HGB 9.7*   HCT 29.6*          BMP:   Recent Labs     04/12/19  0937      K 5.8*   CL 97*   CO2 28   BUN 47*   CREATININE 2.2*   GLUCOSE 99       Troponin:   Recent Labs     04/12/19  0937   TROPONINI 0.03*       U/A:  Recent Labs     04/12/19  0937   COLORU Yellow   PHUR 6.5   CLARITYU Clear   SPECGRAV 1.010   LEUKOCYTESUR Negative   UROBILINOGEN 0.2   BILIRUBINUR Negative   BLOODU Negative   GLUCOSEU Negative       CT CHEST WO CONTRAST   Final Result      1. Consolidation right lobe increased. 2. Large right pleural effusion increased. 3. Small to moderate left pleural effusion. 4. Centrilobular emphysema. 5. No evidence of pneumothorax. XR CHEST PORTABLE   Final Result      1.  Right hydropneumothorax with evaluated the patient. I have reviewed the current history, physical findings, labs and assessment and plan and agree with note as documented by resident MD ( Heena Olmos)  Procalcitonin- 0.08; R sided pneumonia was ruled out. Shortness of breath most probably secondary to acute on chronic diastolic heart failure. IV Zosyn will be discontinued.   Discussed the patient's condition with his son, Dr. Miguel Angel Lemus    Will continue IV Lasix; may need R sided thoracentesis in 2-3 days if right-sided hydrothorax is not responding to IV Lasix    Nevaeh Sales MD, Brandon Herrera

## 2019-04-12 NOTE — PROGRESS NOTES
Provided report to Tamiko Tran RN on [address]. Patient will be transferred to room 4305 shortly.     Cherie Pearson, Nursing Student  04/12/2019, 5009

## 2019-04-12 NOTE — PROGRESS NOTES
Zosyn 3.375g IV q8h EI ordered for patient. This medication is renally eliminated. Will change to Zosyn 3.375g IV q12h EI per renal dose adjustment policy. Estimated Creatinine Clearance: 18 mL/min (A) (based on SCr of 2.2 mg/dL (H)). Pharmacy will continue to monitor renal function and adjust dose as necessary. Please call with any questions. Thanks!   Sarah Skinner, PharmD  [id number]  4/12/2019 2:43 PM

## 2019-04-12 NOTE — PROGRESS NOTES
4 Eyes Admission Assessment     I agree as the admission nurse that 2 RN's have performed a thorough Head to Toe Skin Assessment on the patient. ALL assessment sites listed below have been assessed on admission. Areas assessed by both nurses: yes  [x]   Head, Face, and Ears   [x]   Shoulders, Back, and Chest  [x]   Arms, Elbows, and Hands   [x]   Coccyx, Sacrum, and Ischum  [x]   Legs, Feet, and Heels        Does the Patient have Skin Breakdown?   No         He Prevention initiated:  NA  Wound Care Orders initiated:  NA      [facility] nurse consulted for Pressure Injury (Stage 3,4, Unstageable, DTI, NWPT, and Complex wounds):  No      Nurse 1 eSignature: Electronically signed by King Peña RN on 4/12/19 at 5:31 PM    **SHARE this note so that the co-signing nurse is able to place an eSignature**    Nurse 2 eSignature: Electronically signed by Mame Arias RN on 4/12/19 at 5:32 PM

## 2019-04-12 NOTE — ED PROVIDER NOTES
[address] [facility] ENCOUNTER          ATTENDING PHYSICIAN NOTE       Date of evaluation: 4/12/2019    Chief Complaint     Shortness of Breath      History of Present Illness     Leobardo Baker is a 80 y.o. male who presents with gradually worsening dyspnea on exertion. The patient was admitted in early March and treated for pneumonia. Patient states that at that time he was feeling very similar to how he is feeling right now, but he felt much better after the thoracentesis that was performed during that admission. Patient states that he felt well for a couple of weeks after this procedure, but then gradually began developing worsening dyspnea on exertion again. He now states that he is so short of breath that he cannot get up and walk around his independent living apartment. He does note a intermittent, minimally productive cough. He denies any chest pain. He denies any fevers or chills. The patient denies any significant peripheral edema. Review of Systems     Review of Systems   Constitutional: Positive for activity change. Negative for appetite change, chills and fever. HENT: Negative. Eyes: Negative. Respiratory: Positive for cough and shortness of breath. Negative for chest tightness. Cardiovascular: Negative. Negative for chest pain and leg swelling. Gastrointestinal: Negative. Endocrine: Negative. Genitourinary: Negative. Musculoskeletal: Negative. Skin: Negative. Allergic/Immunologic: Negative. Neurological: Negative. Negative for dizziness. Hematological: Negative. Psychiatric/Behavioral: Negative. Past Medical, Surgical, Family, and Social History     He has a past medical history of Anemia, Anxiety, Atrial fibrillation ([address] CAD (coronary artery disease), CHF (congestive heart failure) ([address] Chronic renal insufficiency, Gout, History of BPH, Hyperlipidemia, Hypertension, and Hypothyroidism.   He has a past surgical history that includes Pacemaker insertion (2008); Coronary artery bypass graft (2001); shoulder surgery; Colon surgery (2004); and Cardiac defibrillator placement. His family history includes Dementia in his mother; Emphysema in his father; Other in his brother and father. He reports that he has quit smoking. He has never used smokeless tobacco. He reports that he drinks about 3.6 oz of alcohol per week. He reports that he does not use drugs. Medications     Previous Medications    ACETAMINOPHEN (APAP) 325 MG TABS    Take 650 mg by mouth every 6 hours as needed for Pain or Fever    ALLOPURINOL (ZYLOPRIM) 100 MG TABLET    TAKE 1 TABLET BY MOUTH DAILY    ALPRAZOLAM (XANAX) 0.25 MG TABLET    Take 1 tablet by mouth daily as needed for Sleep for up to 60 days.     ASPIRIN LOW DOSE 81 MG EC TABLET    TAKE 1 TABLET BY MOUTH DAILY    ATORVASTATIN (LIPITOR) 40 MG TABLET    TAKE 1 TABLET BY MOUTH EVERY MORNING    CLOPIDOGREL (PLAVIX) 75 MG TABLET    TAKE 1 TABLET BY MOUTH DAILY    DILTIAZEM (CARDIZEM CD) 120 MG EXTENDED RELEASE CAPSULE    TAKE 1 CAP BY MOUTH DAILY (DX: _________)    ESCITALOPRAM (LEXAPRO) 5 MG TABLET    TAKE 1 TABLET BY MOUTH DAILY TO HELP ANXIETY    FAMOTIDINE (PEPCID) 20 MG TABLET    TAKE 1 TABLET BY MOUTH DAILY AS NEEDED FOR REFLUX    FAMOTIDINE (PEPCID) 20 MG TABLET    TAKE 1 TABLET BY MOUTH DAILY AS NEEDED FOR REFLUX    FENOFIBRATE (TRICOR) 54 MG TABLET    TAKE 1 TABLET BY MOUTH EVERY DAY    FLUTICASONE (FLONASE) 50 MCG/ACT NASAL SPRAY    USE 2SPRAYS BY NASAL ROUTE DAILY    FUROSEMIDE (LASIX) 40 MG TABLET    Take 1 tablet by mouth 2 times daily    LEVOTHYROXINE (SYNTHROID) 100 MCG TABLET    TAKE 1 TABLET BY MOUTH DAILY    MELATONIN 5 MG CAPS    TAKE 1 CAPSULE BY MOUTH NIGHTLY AS NEEDED FOR SLEEP    METOPROLOL SUCCINATE (TOPROL XL) 100 MG EXTENDED RELEASE TABLET    Take 1 tablet by mouth daily    MULTIPLE VITAMINS-MINERALS (CENTRUM SILVER) TABS    TAKE 1 TABLET BY MOUTH DAILY    VITAMIN D (CHOLECALCIFEROL) 1000 UNIT TABS TABLET    Take 1,000 Units by mouth daily       Allergies     He is allergic to lactose. Physical Exam     INITIAL VITALS: BP: 122/70, Temp: 97.8 °F (36.6 °C), Pulse: 98, Resp: 18, SpO2: 95 %     General: Slender, generally well appearing gentleman. Pleasantly conversational, and in NAD. HEENT: Pupils are equal, round, and reactive to light. Extraocular muscles are intact. Conjunctivae are clear and moist. No redness or drainage from the eyes. No drainage from the nose. The oropharynx appeared to be normal.    Neck: Supple, with full range of motion. Cardiovascular: Normal S1-S2 without murmur rub or gallop. 2+ radial pulses bilaterally. 2+ DP pulses bilaterally. Respiratory: Unlabored breathing with equal chest rise and fall. There is significantly decreased air entry at the base of the right lung, with crackles in the mid lung field. Normal breath sounds on the left. Abdomen: Soft and nontender, without guarding or rebound tenderness. No masses or hepatosplenomegaly. Skin: Warm and dry, without rashes or ecchymoses, lacerations or abrasions. Neuro: Alert and oriented x3. No focal neurologic deficits are noted. Extremities: Warm and well-perfused, without clubbing, cyanosis, or edema. The patient moves all extremities equally. Psych: The patient's mood and affect are generally within normal limits for their presentation. Diagnostic Results     EKG   Ventricularly paced rhythm with a rate of 98 bpm.  Leftward axis. QRS duration 198 ms, QTc 610 ms. This is essentially unchanged from a prior EKG dated June 5, 2018. RADIOLOGY:  XR CHEST PORTABLE   Final Result      1. Right hydropneumothorax with new fluid component compared to the chest x-ray from 3/6/2019. Critical results discussed with Dr. Jigar Kraft by Dr. Bee Alu 29:80.   2. Consolidation of the right mid and lower lung zones new.                 LABS:   Results for orders placed or performed during the hospital encounter of 04/12/19   CBC auto differential   Result Value Ref Range    WBC 4.7 4.0 - 11.0 K/uL    RBC 3.18 (L) 4.20 - 5.90 M/uL    Hemoglobin 9.7 (L) 13.5 - 17.5 g/dL    Hematocrit 29.6 (L) 40.5 - 52.5 %    MCV 93.3 80.0 - 100.0 fL    MCH 30.4 26.0 - 34.0 pg    MCHC 32.6 31.0 - 36.0 g/dL    RDW 17.8 (H) 12.4 - 15.4 %    Platelets 179 697 - 288 K/uL    MPV 7.4 5.0 - 10.5 fL    Neutrophils % 64.8 %    Lymphocytes % 17.2 %    Monocytes % 12.4 %    Eosinophils % 4.9 %    Basophils % 0.7 %    Neutrophils # 3.1 1.7 - 7.7 K/uL    Lymphocytes # 0.8 (L) 1.0 - 5.1 K/uL    Monocytes # 0.6 0.0 - 1.3 K/uL    Eosinophils # 0.2 0.0 - 0.6 K/uL    Basophils # 0.0 0.0 - 0.2 K/uL   Brain Natriuretic Peptide   Result Value Ref Range    Pro-BNP 17,195 (H) 0 - 449 pg/mL   Basic Metabolic Panel w/ Reflex to MG   Result Value Ref Range    Sodium 136 136 - 145 mmol/L    Potassium reflex Magnesium 5.8 (H) 3.5 - 5.1 mmol/L    Chloride 97 (L) 99 - 110 mmol/L    CO2 28 21 - 32 mmol/L    Anion Gap 11 3 - 16    Glucose 99 70 - 99 mg/dL    BUN 47 (H) 7 - 20 mg/dL    CREATININE 2.2 (H) 0.8 - 1.3 mg/dL    GFR Non-African American 28 (A) >60    GFR  34 (A) >60    Calcium 8.8 8.3 - 10.6 mg/dL   Troponin (lab)   Result Value Ref Range    Troponin 0.03 (H) <0.01 ng/mL   Urinalysis, reflex to microscopic (Lab)   Result Value Ref Range    Urine Type Not Specified    POCT Venous   Result Value Ref Range    pH, Mary 7.458 (H) 7.350 - 7.450    pCO2, Mary 43.2 40.0 - 50.0 mm Hg    pO2, Mary 26 Not Established mm Hg    HCO3, Venous 30.6 (H) 23.0 - 29.0 mmol/L    Base Excess, Mary 7 (H) -3 - 3    O2 Sat, Mary 51 Not Established %    TC02 (Calc), Mary 32 Not Established mmol/L    Lactate 0.85 0.40 - 2.00 mmol/L    Sample Type MARY     Performed on SEE BELOW    EKG 12 Lead   Result Value Ref Range    Ventricular Rate 98 BPM    Atrial Rate 89 BPM    QRS Duration 198 ms    Q-T Interval 478 ms    QTc Calculation (Bazett) 610 ms    R Axis -85

## 2019-04-13 LAB
ANION GAP SERPL CALCULATED.3IONS-SCNC: 12 MMOL/L (ref 3–16)
BASOPHILS ABSOLUTE: 0 K/UL (ref 0–0.2)
BASOPHILS RELATIVE PERCENT: 0.5 %
BUN BLDV-MCNC: 47 MG/DL (ref 7–20)
CALCIUM SERPL-MCNC: 8.9 MG/DL (ref 8.3–10.6)
CHLORIDE BLD-SCNC: 99 MMOL/L (ref 99–110)
CO2: 30 MMOL/L (ref 21–32)
CREAT SERPL-MCNC: 2.4 MG/DL (ref 0.8–1.3)
EOSINOPHILS ABSOLUTE: 0.2 K/UL (ref 0–0.6)
EOSINOPHILS RELATIVE PERCENT: 4.5 %
GFR AFRICAN AMERICAN: 31
GFR NON-AFRICAN AMERICAN: 25
GLUCOSE BLD-MCNC: 98 MG/DL (ref 70–99)
HCT VFR BLD CALC: 30.1 % (ref 40.5–52.5)
HEMOGLOBIN: 10 G/DL (ref 13.5–17.5)
LYMPHOCYTES ABSOLUTE: 0.8 K/UL (ref 1–5.1)
LYMPHOCYTES RELATIVE PERCENT: 15.1 %
MAGNESIUM: 2.3 MG/DL (ref 1.8–2.4)
MCH RBC QN AUTO: 30.6 PG (ref 26–34)
MCHC RBC AUTO-ENTMCNC: 33.2 G/DL (ref 31–36)
MCV RBC AUTO: 92.2 FL (ref 80–100)
MONOCYTES ABSOLUTE: 0.7 K/UL (ref 0–1.3)
MONOCYTES RELATIVE PERCENT: 13.1 %
NEUTROPHILS ABSOLUTE: 3.5 K/UL (ref 1.7–7.7)
NEUTROPHILS RELATIVE PERCENT: 66.8 %
PDW BLD-RTO: 17.5 % (ref 12.4–15.4)
PLATELET # BLD: 186 K/UL (ref 135–450)
PMV BLD AUTO: 7.3 FL (ref 5–10.5)
POTASSIUM SERPL-SCNC: 4.2 MMOL/L (ref 3.5–5.1)
RBC # BLD: 3.26 M/UL (ref 4.2–5.9)
REPORT: NORMAL
RESPIRATORY PANEL PCR: NORMAL
SODIUM BLD-SCNC: 141 MMOL/L (ref 136–145)
WBC # BLD: 5.2 K/UL (ref 4–11)

## 2019-04-13 PROCEDURE — 2580000003 HC RX 258: Performed by: STUDENT IN AN ORGANIZED HEALTH CARE EDUCATION/TRAINING PROGRAM

## 2019-04-13 PROCEDURE — 99232 SBSQ HOSP IP/OBS MODERATE 35: CPT | Performed by: HOSPITALIST

## 2019-04-13 PROCEDURE — 6360000002 HC RX W HCPCS: Performed by: STUDENT IN AN ORGANIZED HEALTH CARE EDUCATION/TRAINING PROGRAM

## 2019-04-13 PROCEDURE — 36415 COLL VENOUS BLD VENIPUNCTURE: CPT

## 2019-04-13 PROCEDURE — 80048 BASIC METABOLIC PNL TOTAL CA: CPT

## 2019-04-13 PROCEDURE — 1200000000 HC SEMI PRIVATE

## 2019-04-13 PROCEDURE — 85025 COMPLETE CBC W/AUTO DIFF WBC: CPT

## 2019-04-13 PROCEDURE — 6370000000 HC RX 637 (ALT 250 FOR IP): Performed by: STUDENT IN AN ORGANIZED HEALTH CARE EDUCATION/TRAINING PROGRAM

## 2019-04-13 PROCEDURE — 83735 ASSAY OF MAGNESIUM: CPT

## 2019-04-13 PROCEDURE — 2060000000 HC ICU INTERMEDIATE R&B

## 2019-04-13 RX ADMIN — Medication 10 ML: at 08:40

## 2019-04-13 RX ADMIN — FENOFIBRATE 54 MG: 54 TABLET ORAL at 08:40

## 2019-04-13 RX ADMIN — METOPROLOL SUCCINATE 100 MG: 100 TABLET, EXTENDED RELEASE ORAL at 08:39

## 2019-04-13 RX ADMIN — DILTIAZEM HYDROCHLORIDE 120 MG: 120 CAPSULE, COATED, EXTENDED RELEASE ORAL at 08:40

## 2019-04-13 RX ADMIN — HEPARIN SODIUM 5000 UNITS: 5000 INJECTION INTRAVENOUS; SUBCUTANEOUS at 06:21

## 2019-04-13 RX ADMIN — HEPARIN SODIUM 5000 UNITS: 5000 INJECTION INTRAVENOUS; SUBCUTANEOUS at 21:49

## 2019-04-13 RX ADMIN — ESCITALOPRAM OXALATE 5 MG: 10 TABLET, FILM COATED ORAL at 08:39

## 2019-04-13 RX ADMIN — Medication 10 ML: at 20:41

## 2019-04-13 RX ADMIN — MULTIPLE VITAMINS W/ MINERALS TAB 1 TABLET: TAB at 08:40

## 2019-04-13 RX ADMIN — FUROSEMIDE 40 MG: 10 INJECTION, SOLUTION INTRAMUSCULAR; INTRAVENOUS at 20:41

## 2019-04-13 RX ADMIN — FAMOTIDINE 20 MG: 20 TABLET ORAL at 08:40

## 2019-04-13 RX ADMIN — ASPIRIN 81 MG: 81 TABLET, COATED ORAL at 08:40

## 2019-04-13 RX ADMIN — FUROSEMIDE 40 MG: 10 INJECTION, SOLUTION INTRAMUSCULAR; INTRAVENOUS at 08:39

## 2019-04-13 RX ADMIN — ALLOPURINOL 100 MG: 100 TABLET ORAL at 08:40

## 2019-04-13 RX ADMIN — ALPRAZOLAM 0.25 MG: 0.25 TABLET ORAL at 21:49

## 2019-04-13 RX ADMIN — VITAMIN D, TAB 1000IU (100/BT) 1000 UNITS: 25 TAB at 08:39

## 2019-04-13 RX ADMIN — LEVOTHYROXINE SODIUM 100 MCG: 100 TABLET ORAL at 08:40

## 2019-04-13 RX ADMIN — ATORVASTATIN CALCIUM 40 MG: 40 TABLET, FILM COATED ORAL at 08:40

## 2019-04-13 ASSESSMENT — PAIN SCALES - GENERAL
PAINLEVEL_OUTOF10: 0

## 2019-04-13 NOTE — PLAN OF CARE
Problem: Falls - Risk of:  Goal: Will remain free from falls  Description  Will remain free from falls  Outcome: Ongoing   Fall precautions are in place. Bed alarm is on and in lowest position. Pt is using call light appropriately. Will continue to monitor. Problem: Fluid Volume:  Goal: Hemodynamic stability will improve  Description  Hemodynamic stability will improve  Outcome: Ongoing   Pt's VSS. Pt is receiving IV lasix. Monitoring intake, output, and daily weight.    Vitals:    04/13/19 0509   BP: 117/66   Pulse: 97   Resp: 20   Temp: 96.9 °F (36.1 °C)   SpO2: 92%

## 2019-04-13 NOTE — PROGRESS NOTES
Nutrition Education    Type and Reason for Visit: Consult, Patient Education    Subjective:  Consult received for diet education (heart failure diet guidelines). Pt is not appropriate for diet education since pt lives in [address] with meals prepared for him. Will follow per standards of care.       Electronically signed by Vilma Kraft RD, LD on 4/13/19 at 4:01 PM    Contact Number: [phone]

## 2019-04-13 NOTE — PROGRESS NOTES
Progress Note    Hospital Day: 2                                                         Code:Limited  Admit Date: 4/12/2019                                 PCP: Karla Hi MD                     Daily Plan:  4/13/2019    Subjective:   Patient feeling well this AM. Reports SOB is about the same. denies N/V, abdominal pain, chest pain, fevers, chills. MEDICATIONS:   Scheduled Meds:   allopurinol  100 mg Oral Daily    aspirin  81 mg Oral Daily    atorvastatin  40 mg Oral QAM    diltiazem  120 mg Oral Daily    escitalopram  5 mg Oral Daily    fenofibrate  54 mg Oral Daily    fluticasone  2 spray Each Nare Daily    levothyroxine  100 mcg Oral Daily    metoprolol succinate  100 mg Oral Daily    therapeutic multivitamin-minerals  1 tablet Oral Daily    vitamin D  1,000 Units Oral Daily    sodium chloride flush  10 mL Intravenous 2 times per day    heparin (porcine)  5,000 Units Subcutaneous 3 times per day    furosemide  40 mg Intravenous BID    famotidine  20 mg Oral Daily      Continuous Infusions:  PRN Meds:acetaminophen, ALPRAZolam, melatonin, sodium chloride flush, magnesium hydroxide, ondansetron    Allergies: Allergies   Allergen Reactions    Lactose        PHYSICAL EXAM:       Vitals: /71   Pulse 99   Temp 97.5 °F (36.4 °C) (Oral)   Resp 18   Ht 5' 6\" (1.676 m)   Wt 125 lb 7.1 oz (56.9 kg)   SpO2 94%   BMI 20.25 kg/m²     I/O:      Intake/Output Summary (Last 24 hours) at 4/13/2019 1052  Last data filed at 4/13/2019 0941  Gross per 24 hour   Intake 540 ml   Output 1600 ml   Net -1060 ml     I/O this shift:  In: 240 [P.O.:240]  Out: -   I/O last 3 completed shifts: In: 300 [P.O.:300]  Out: 1600 [Urine:1600]    Physical Examination:   · General appearance: Appears comfortable at rest, fully alert and orientated    · HEENT: Atraumatic, normocephalic, moist mucus membranes  · Respiratory: Normal respiratory effort. No wheezes, rubs, or crackles.  decreased breath sounds on the right  · Cardiovascular: regular S1/S2, with no Murmur, rub or gallop. · Abdomen: Soft, non-tender, non-distended  · Musculoskeletal: No clubbing, cyanosis, no lower extremity edema, peripheral pulses present, cap refill < 2sec  · Neurologic: Neurovascularly grossly intact without any focal motor deficits. Cranial nerves:  grossly non-focal.      No results for input(s): PHART, UFK4JMH, PO2ART in the last 72 hours. DATA:       Labs  CBC:   Recent Labs     04/12/19  0937 04/13/19  0507   WBC 4.7 5.2   HGB 9.7* 10.0*   HCT 29.6* 30.1*    186       BMP:   Recent Labs     04/12/19  0937 04/12/19  1457 04/13/19  0507    137 141   K 5.8* 4.0 4.2   CL 97* 96* 99   CO2 28 28 30   BUN 47* 46* 47*   CREATININE 2.2* 2.3* 2.4*   GLUCOSE 99 85 98     LFT's: No results for input(s): AST, ALT, ALB, BILITOT, ALKPHOS in the last 72 hours. Troponin:   Recent Labs     04/12/19  0937 04/12/19  1457 04/12/19  2124   TROPONINI 0.03* 0.04* 0.03*     BNP: No results for input(s): BNP in the last 72 hours. ABGs: No results for input(s): PHART, WZQ1ZTT, PO2ART in the last 72 hours. INR:   Recent Labs     04/12/19  1457   INR 1.18*     Lipids: No results for input(s): CHOL, HDL in the last 72 hours. Invalid input(s): LDLCALCU    U/A:  Recent Labs     04/12/19  0937   COLORU Yellow   PHUR 6.5   CLARITYU Clear   SPECGRAV 1.010   LEUKOCYTESUR Negative   UROBILINOGEN 0.2   BILIRUBINUR Negative   BLOODU Negative   GLUCOSEU Negative        ASSESSMENT AND PLAN:   Gabi Arauz is a 80 y.o. male with PMH of CHF, CAD s/p CABG and defib placement, HTN, HLD, hypothyroidism, Afib, CKD who presents with dyspnea on exertion.     Dyspnea 2/2 R pleural effusion and CHF exacerbation and PNA - likely recurrence since last admission 3/2019. Pneumothorax still present from a month ago. No signs of infection other than mild cough. No leukocytosis. Afebrile. Last Echo showed afib with EF around 50%. Fluid analysis 3/2019. , protein 7.5. Pleural LD was 130 and protein was 3.1 so it did not meet criteria for exudative effusion. - CT chest: with consolidation and large pleural effusion  - attempting diuresis with lasix  - will likely need thoracentesis, however on plavix. Will hold and plan for thoracentesis on monday    Acute on chronic CHFpEF - last echo with EF 50%. Pro-bnp elevated to 17k from 6k 2 months ago  - lasix 40 BID IV from 40 BID PO  - Echocardiogram     Hydrothorax - seen on chest xray today. Stable pneumothorax with reaccumulation of fluid. - as above     CAD - s/p CABG  - continue ASA  - holding plavix for now     Hx of Afib - currently in V paced sinus rhythm  - continue BB and diltiazem     HLD  - continue statin     HTN   - continue toprol XL     Hypothyroidism  - continue home synthroid     Anxiety  - continue home meds     Will discuss with attending physician Dr. Nida Ware     Code Status: Limited okay to resuscitative meds only. FEN: cardiac diet  PPX: heparin, pepcid  DISPO: IP    W/D/W/A  -----------------------------  Juanita Sargent, PGY3  4/13/2019     Addendum to Resident H& P/Progress note:  I have personally seen,examined and evaluated the patient.  I have reviewed the current history, physical findings, labs and assessment and plan and agree with note as documented by resident MD ( Charlie Magallanes)      Kenyatta Taylor MD, María Elena Marr

## 2019-04-14 LAB
ANION GAP SERPL CALCULATED.3IONS-SCNC: 13 MMOL/L (ref 3–16)
BASOPHILS ABSOLUTE: 0 K/UL (ref 0–0.2)
BASOPHILS RELATIVE PERCENT: 0.7 %
BUN BLDV-MCNC: 51 MG/DL (ref 7–20)
CALCIUM SERPL-MCNC: 9.1 MG/DL (ref 8.3–10.6)
CHLORIDE BLD-SCNC: 98 MMOL/L (ref 99–110)
CO2: 29 MMOL/L (ref 21–32)
CREAT SERPL-MCNC: 2.4 MG/DL (ref 0.8–1.3)
EOSINOPHILS ABSOLUTE: 0.2 K/UL (ref 0–0.6)
EOSINOPHILS RELATIVE PERCENT: 3.3 %
GFR AFRICAN AMERICAN: 31
GFR NON-AFRICAN AMERICAN: 25
GLUCOSE BLD-MCNC: 143 MG/DL (ref 70–99)
HCT VFR BLD CALC: 30.8 % (ref 40.5–52.5)
HEMOGLOBIN: 10.1 G/DL (ref 13.5–17.5)
LYMPHOCYTES ABSOLUTE: 0.9 K/UL (ref 1–5.1)
LYMPHOCYTES RELATIVE PERCENT: 15.2 %
MAGNESIUM: 2.4 MG/DL (ref 1.8–2.4)
MCH RBC QN AUTO: 30.4 PG (ref 26–34)
MCHC RBC AUTO-ENTMCNC: 32.7 G/DL (ref 31–36)
MCV RBC AUTO: 93.1 FL (ref 80–100)
MONOCYTES ABSOLUTE: 0.8 K/UL (ref 0–1.3)
MONOCYTES RELATIVE PERCENT: 13.7 %
NEUTROPHILS ABSOLUTE: 4 K/UL (ref 1.7–7.7)
NEUTROPHILS RELATIVE PERCENT: 67.1 %
PDW BLD-RTO: 17.7 % (ref 12.4–15.4)
PLATELET # BLD: 190 K/UL (ref 135–450)
PMV BLD AUTO: 7.4 FL (ref 5–10.5)
POTASSIUM SERPL-SCNC: 3.5 MMOL/L (ref 3.5–5.1)
RBC # BLD: 3.31 M/UL (ref 4.2–5.9)
SODIUM BLD-SCNC: 140 MMOL/L (ref 136–145)
WBC # BLD: 5.9 K/UL (ref 4–11)

## 2019-04-14 PROCEDURE — 99232 SBSQ HOSP IP/OBS MODERATE 35: CPT | Performed by: HOSPITALIST

## 2019-04-14 PROCEDURE — 85025 COMPLETE CBC W/AUTO DIFF WBC: CPT

## 2019-04-14 PROCEDURE — 6360000002 HC RX W HCPCS: Performed by: STUDENT IN AN ORGANIZED HEALTH CARE EDUCATION/TRAINING PROGRAM

## 2019-04-14 PROCEDURE — 36415 COLL VENOUS BLD VENIPUNCTURE: CPT

## 2019-04-14 PROCEDURE — 80048 BASIC METABOLIC PNL TOTAL CA: CPT

## 2019-04-14 PROCEDURE — 2060000000 HC ICU INTERMEDIATE R&B

## 2019-04-14 PROCEDURE — 1200000000 HC SEMI PRIVATE

## 2019-04-14 PROCEDURE — 6370000000 HC RX 637 (ALT 250 FOR IP): Performed by: STUDENT IN AN ORGANIZED HEALTH CARE EDUCATION/TRAINING PROGRAM

## 2019-04-14 PROCEDURE — 2580000003 HC RX 258: Performed by: STUDENT IN AN ORGANIZED HEALTH CARE EDUCATION/TRAINING PROGRAM

## 2019-04-14 PROCEDURE — 83735 ASSAY OF MAGNESIUM: CPT

## 2019-04-14 RX ADMIN — FUROSEMIDE 40 MG: 10 INJECTION, SOLUTION INTRAMUSCULAR; INTRAVENOUS at 19:57

## 2019-04-14 RX ADMIN — ALPRAZOLAM 0.25 MG: 0.25 TABLET ORAL at 20:04

## 2019-04-14 RX ADMIN — HEPARIN SODIUM 5000 UNITS: 5000 INJECTION INTRAVENOUS; SUBCUTANEOUS at 06:04

## 2019-04-14 RX ADMIN — Medication 10 ML: at 19:58

## 2019-04-14 ASSESSMENT — PAIN SCALES - GENERAL
PAINLEVEL_OUTOF10: 0

## 2019-04-14 NOTE — PROGRESS NOTES
regular S1/S2, with no Murmur, rub or gallop. · Abdomen: Soft, non-tender, non-distended  · Musculoskeletal: No clubbing, cyanosis, no lower extremity edema, peripheral pulses present, cap refill < 2sec  · Neurologic: Neurovascularly grossly intact without any focal motor deficits. Cranial nerves:  grossly non-focal.      No results for input(s): PHART, [id number], PO2ART in the last 72 hours. DATA:       Labs  CBC:   Recent Labs     04/12/19  0937 04/13/19  0507 04/14/19  0451   WBC 4.7 5.2 5.9   HGB 9.7* 10.0* 10.1*   HCT 29.6* 30.1* 30.8*    186 190       BMP:   Recent Labs     04/12/19  1457 04/13/19  0507 04/14/19  0451    141 140   K 4.0 4.2 3.5   CL 96* 99 98*   CO2 28 30 29   BUN 46* 47* 51*   CREATININE 2.3* 2.4* 2.4*   GLUCOSE 85 98 143*     LFT's: No results for input(s): AST, ALT, ALB, BILITOT, ALKPHOS in the last 72 hours. Troponin:   Recent Labs     04/12/19  0937 04/12/19  1457 04/12/19 2124   TROPONINI 0.03* 0.04* 0.03*     BNP: No results for input(s): BNP in the last 72 hours. ABGs: No results for input(s): PHART, [id number], PO2ART in the last 72 hours. INR:   Recent Labs     04/12/19  1457   INR 1.18*     Lipids: No results for input(s): CHOL, HDL in the last 72 hours. Invalid input(s): LDLCALCU    U/A:  Recent Labs     04/12/19  0937   COLORU Yellow   PHUR 6.5   CLARITYU Clear   SPECGRAV 1.010   LEUKOCYTESUR Negative   UROBILINOGEN 0.2   BILIRUBINUR Negative   BLOODU Negative   GLUCOSEU Negative        ASSESSMENT AND PLAN:   Kourtney Hawthorne is a 80 y.o. male with PMH of CHF, CAD s/p CABG and defib placement, HTN, HLD, hypothyroidism, Afib, CKD who presents with dyspnea on exertion.     Dyspnea 2/2 R pleural effusion and CHF exacerbation  - likely recurrence since last admission 3/2019. Merline Sarah No signs of infection other than mild cough. No leukocytosis. Afebrile. Last Echo showed afib with EF around 50%. Fluid analysis 3/2019. , protein 7.5.  Pleural LD was 130 and protein was 3.1 so it did not meet criteria for exudative effusion. - CT chest: with consolidation and large pleural effusion  - attempting diuresis with lasix  - CXR tomorrow AM  - will likely need thoracentesis, however on plavix. Will hold and plan for thoracentesis on monday    Acute on chronic CHFpEF - last echo with EF 50%. Pro-bnp elevated to 17k from 6k 2 months ago  - lasix 40 BID IV from 40 BID PO  - Echocardiogram     Hydrothorax - seen on chest xray today. Stable pneumothorax with reaccumulation of fluid. - as above     CAD - s/p CABG  - continue ASA  - holding plavix for now     Hx of Afib - currently in V paced sinus rhythm  - continue BB and diltiazem     HLD  - continue statin     HTN   - continue toprol XL     Hypothyroidism  - continue home synthroid     Anxiety  - continue home meds     Will discuss with attending physician Dr. Yen Babcock     Code Status: Limited okay to resuscitative meds only. FEN: cardiac diet  PPX: heparin, pepcid  DISPO: IP    W/D/W/A  -----------------------------  Joshua Negron, PGY3  4/14/2019     Addendum to Resident H& P/Progress note:  I have personally seen,examined and evaluated the patient.  I have reviewed the current history, physical findings, labs and assessment and plan and agree with note as documented by resident MD ( Zakia Rolon)      Cinthia Manning MD, Aga Ramires

## 2019-04-15 ENCOUNTER — APPOINTMENT (OUTPATIENT)
Dept: GENERAL RADIOLOGY | Age: 84
DRG: 291 | End: 2019-04-15
Payer: MEDICARE

## 2019-04-15 LAB
ANION GAP SERPL CALCULATED.3IONS-SCNC: 11 MMOL/L (ref 3–16)
BASOPHILS ABSOLUTE: 0 K/UL (ref 0–0.2)
BASOPHILS RELATIVE PERCENT: 0.5 %
BUN BLDV-MCNC: 54 MG/DL (ref 7–20)
CALCIUM SERPL-MCNC: 9.1 MG/DL (ref 8.3–10.6)
CHLORIDE BLD-SCNC: 101 MMOL/L (ref 99–110)
CO2: 30 MMOL/L (ref 21–32)
CREAT SERPL-MCNC: 2.3 MG/DL (ref 0.8–1.3)
EKG ATRIAL RATE: 115 BPM
EKG DIAGNOSIS: NORMAL
EKG Q-T INTERVAL: 452 MS
EKG QRS DURATION: 186 MS
EKG QTC CALCULATION (BAZETT): 594 MS
EKG R AXIS: -88 DEGREES
EKG T AXIS: 92 DEGREES
EKG VENTRICULAR RATE: 104 BPM
EOSINOPHILS ABSOLUTE: 0.3 K/UL (ref 0–0.6)
EOSINOPHILS RELATIVE PERCENT: 5.7 %
GFR AFRICAN AMERICAN: 32
GFR NON-AFRICAN AMERICAN: 27
GLUCOSE BLD-MCNC: 112 MG/DL (ref 70–99)
HCT VFR BLD CALC: 29.7 % (ref 40.5–52.5)
HEMOGLOBIN: 9.8 G/DL (ref 13.5–17.5)
LV EF: 48 %
LVEF MODALITY: NORMAL
LYMPHOCYTES ABSOLUTE: 1 K/UL (ref 1–5.1)
LYMPHOCYTES RELATIVE PERCENT: 18.9 %
MAGNESIUM: 2.3 MG/DL (ref 1.8–2.4)
MCH RBC QN AUTO: 30.6 PG (ref 26–34)
MCHC RBC AUTO-ENTMCNC: 33.1 G/DL (ref 31–36)
MCV RBC AUTO: 92.4 FL (ref 80–100)
MONOCYTES ABSOLUTE: 0.8 K/UL (ref 0–1.3)
MONOCYTES RELATIVE PERCENT: 15.1 %
NEUTROPHILS ABSOLUTE: 3 K/UL (ref 1.7–7.7)
NEUTROPHILS RELATIVE PERCENT: 59.8 %
PDW BLD-RTO: 17.7 % (ref 12.4–15.4)
PLATELET # BLD: 202 K/UL (ref 135–450)
PMV BLD AUTO: 7.4 FL (ref 5–10.5)
POTASSIUM SERPL-SCNC: 4.8 MMOL/L (ref 3.5–5.1)
RBC # BLD: 3.21 M/UL (ref 4.2–5.9)
SODIUM BLD-SCNC: 142 MMOL/L (ref 136–145)
WBC # BLD: 5.1 K/UL (ref 4–11)

## 2019-04-15 PROCEDURE — 6370000000 HC RX 637 (ALT 250 FOR IP): Performed by: STUDENT IN AN ORGANIZED HEALTH CARE EDUCATION/TRAINING PROGRAM

## 2019-04-15 PROCEDURE — 2060000000 HC ICU INTERMEDIATE R&B

## 2019-04-15 PROCEDURE — 93010 ELECTROCARDIOGRAM REPORT: CPT | Performed by: INTERNAL MEDICINE

## 2019-04-15 PROCEDURE — 83735 ASSAY OF MAGNESIUM: CPT

## 2019-04-15 PROCEDURE — 80048 BASIC METABOLIC PNL TOTAL CA: CPT

## 2019-04-15 PROCEDURE — 6360000002 HC RX W HCPCS: Performed by: INTERNAL MEDICINE

## 2019-04-15 PROCEDURE — 6360000004 HC RX CONTRAST MEDICATION: Performed by: INTERNAL MEDICINE

## 2019-04-15 PROCEDURE — 6360000002 HC RX W HCPCS: Performed by: STUDENT IN AN ORGANIZED HEALTH CARE EDUCATION/TRAINING PROGRAM

## 2019-04-15 PROCEDURE — 36415 COLL VENOUS BLD VENIPUNCTURE: CPT

## 2019-04-15 PROCEDURE — 2580000003 HC RX 258: Performed by: INTERNAL MEDICINE

## 2019-04-15 PROCEDURE — 85025 COMPLETE CBC W/AUTO DIFF WBC: CPT

## 2019-04-15 PROCEDURE — C8929 TTE W OR WO FOL WCON,DOPPLER: HCPCS

## 2019-04-15 PROCEDURE — 93005 ELECTROCARDIOGRAM TRACING: CPT | Performed by: HOSPITALIST

## 2019-04-15 PROCEDURE — 99223 1ST HOSP IP/OBS HIGH 75: CPT | Performed by: INTERNAL MEDICINE

## 2019-04-15 PROCEDURE — 71046 X-RAY EXAM CHEST 2 VIEWS: CPT

## 2019-04-15 PROCEDURE — 99232 SBSQ HOSP IP/OBS MODERATE 35: CPT | Performed by: HOSPITALIST

## 2019-04-15 PROCEDURE — 2580000003 HC RX 258: Performed by: STUDENT IN AN ORGANIZED HEALTH CARE EDUCATION/TRAINING PROGRAM

## 2019-04-15 RX ADMIN — ALLOPURINOL 100 MG: 100 TABLET ORAL at 09:41

## 2019-04-15 RX ADMIN — FUROSEMIDE 40 MG: 10 INJECTION, SOLUTION INTRAMUSCULAR; INTRAVENOUS at 09:41

## 2019-04-15 RX ADMIN — ESCITALOPRAM OXALATE 5 MG: 10 TABLET, FILM COATED ORAL at 09:40

## 2019-04-15 RX ADMIN — FAMOTIDINE 20 MG: 20 TABLET ORAL at 09:41

## 2019-04-15 RX ADMIN — ALPRAZOLAM 0.25 MG: 0.25 TABLET ORAL at 21:56

## 2019-04-15 RX ADMIN — MULTIPLE VITAMINS W/ MINERALS TAB 1 TABLET: TAB at 09:41

## 2019-04-15 RX ADMIN — PERFLUTREN 2.2 MG: 6.52 INJECTION, SUSPENSION INTRAVENOUS at 12:06

## 2019-04-15 RX ADMIN — VITAMIN D, TAB 1000IU (100/BT) 1000 UNITS: 25 TAB at 09:41

## 2019-04-15 RX ADMIN — Medication 10 ML: at 09:43

## 2019-04-15 RX ADMIN — FUROSEMIDE 10 MG/HR: 10 INJECTION, SOLUTION INTRAMUSCULAR; INTRAVENOUS at 23:26

## 2019-04-15 RX ADMIN — ASPIRIN 81 MG: 81 TABLET, COATED ORAL at 09:40

## 2019-04-15 RX ADMIN — FENOFIBRATE 54 MG: 54 TABLET ORAL at 09:41

## 2019-04-15 RX ADMIN — METOPROLOL SUCCINATE 100 MG: 100 TABLET, EXTENDED RELEASE ORAL at 09:41

## 2019-04-15 RX ADMIN — LEVOTHYROXINE SODIUM 100 MCG: 100 TABLET ORAL at 09:40

## 2019-04-15 RX ADMIN — DILTIAZEM HYDROCHLORIDE 120 MG: 120 CAPSULE, COATED, EXTENDED RELEASE ORAL at 09:41

## 2019-04-15 RX ADMIN — FUROSEMIDE 10 MG/HR: 10 INJECTION, SOLUTION INTRAMUSCULAR; INTRAVENOUS at 14:40

## 2019-04-15 RX ADMIN — ATORVASTATIN CALCIUM 40 MG: 40 TABLET, FILM COATED ORAL at 09:41

## 2019-04-15 ASSESSMENT — PAIN SCALES - GENERAL
PAINLEVEL_OUTOF10: 0

## 2019-04-15 NOTE — CONSULTS
weakness  PSYCHIATRIC/BEHAVIORAL: negative for hallucinations, behavioral problems, confusion and agitation. Objective:   PHYSICAL EXAM:      VITALS:  BP (!) 154/83   Pulse 110   Temp 96 °F (35.6 °C) (Oral)   Resp 16   Ht 5' 6\" (1.676 m)   Wt 124 lb 9 oz (56.5 kg)   SpO2 96%   BMI 20.10 kg/m²   24HR INTAKE/OUTPUT:      Intake/Output Summary (Last 24 hours) at 4/15/2019 1328  Last data filed at 4/15/2019 1150  Gross per 24 hour   Intake 800 ml   Output 1575 ml   Net -775 ml     CURRENT PULSE OXIMETRY:  SpO2: 96 %  24HR PULSE OXIMETRY RANGE:  SpO2  Av %  Min: 94 %  Max: 96 %    CONSTITUTIONAL:  awake, alert, cooperative, no apparent distress, and appears stated age  NECK:  Supple, symmetrical, trachea midline, no adenopathy, thyroid symmetric, not enlarged and no tenderness, skin normal  LUNGS:  no increased work of breathing. No accessory muscle use. Dull breath sounds at right base  CARDIOVASCULAR:  normal S1 and S2, no edema and no JVD  ABDOMEN:  normal bowel sounds, non-distended and no masses palpated, and no tenderness to palpation. No hepatospleenomegaly  LYMPHADENOPATHY:  no axillary or supraclavicular adenopathy. No cervical adnenopathy  PSYCHIATRIC: Oriented to person place and time. No obvious depression or anxiety. MUSCULOSKELETAL: No obvious misalignment or effusion of the joints. No clubbing, cyanosis of the digits. SKIN:  normal skin color, texture, turgor and no redness, warmth, or swelling.  No palpable nodules    DATA:    Old records have been reviewed  CBC with Differential:    Lab Results   Component Value Date    WBC 5.1 04/15/2019    RBC 3.21 04/15/2019    RBC 3.39 2017    HGB 9.8 04/15/2019    HCT 29.7 04/15/2019     04/15/2019    MCV 92.4 04/15/2019    MCH 30.6 04/15/2019    MCHC 33.1 04/15/2019    RDW 17.7 04/15/2019    SEGSPCT 59.1 2012    LYMPHOPCT 18.9 04/15/2019    LYMPHOPCT 28.3 2017    MONOPCT 15.1 04/15/2019    EOSPCT 0.6 2011    BASOPCT thickening lungs. Mild consolidation and groundglass opacity dependently in the left lower lobe. Calcified granuloma left upper lobe on series 2 image 39. No suspicious pulmonary nodules    or masses. Scarring lung apices.       Heart & great vessels: Cardiac conduction device. Coronary artery bypass graft. Coronary and thoracic aortic calcification. Carotid artery calcification. Mediastinal surgical clips.       Pleura & pericardium: Large right pleural effusion slightly increased compared to CT chest 3/5/2019. Small to moderate left pleural effusion. No pneumothorax.       Lymph nodes: No mediastinal, hilar, or axillary lymphadenopathy.       Chest wall & lower neck: Normal.       Upper abdomen: Normal.       Musculoskeletal: Degenerative changes spine with prominent Schmorl's nodes. Sternotomy wires. Canal stenosis of the lower thoracic spine at level of T9-10 and T10-11. Curvature thoracic spine convex to right. [place] screening right greater tuberosity.           Impression       1. Consolidation right lobe increased. 2. Large right pleural effusion increased. 3. Small to moderate left pleural effusion. 4. Centrilobular emphysema. 5. No evidence of pneumothorax. Echo:  Summary   Left ventricular cavity size is normal. There is mild-moderate concentric   left ventricular hypertrophy. Left ventricular function is mildly reduced   with ejection fraction estimated at 45-50%. Septal and apical wall   hypokinesis is present. Indeterminate diastolic function due to afib.   Mild to moderate mitral regurgitation is present.   Moderate tricuspid regurgitation.  IVC size is normal (<2.1cm) and collapses <50% with respiration consistent   with mildly elevated RA pressure (8mmHg). Estimated pulmonary artery   systolic pressure is at 47 mmHg assuming a right atrial pressure of 8 mmHg. Assessment       1. Moderate right transudative pleural effusion   2. HFpEF  3. BATEMAN  4.  CKD    Plan    At this point there are 2 approaches to his recurrence of his transudate pleural effusion. A more aggressive approach would be to pursue thoracentesis however that comes at a risk of another iatrogenic pneumothorax. A more conservative approach would be to try Lasix drip starting at 10 mg an hour and reevaluate in 24-48 hours. If the conservative approach would fail then we could always perform thoracentesis. I went over both approaches with the patient and his son who is a physician.   Irma Audrey seemed to understand both options very well and stated that he would like to try a Lasix drip first.    D/W Dr. Arnulfo Koyanagi

## 2019-04-15 NOTE — PROGRESS NOTES
Progress Note    Hospital Day: 4                                                         Code:Limited  Admit Date: 4/12/2019                                 PCP: Jean-Pierre Torrez MD                     Daily Plan:  4/15/2019    Subjective:   Patient was seen and examined this AM. No acute events overnight. Reports feeling better and is without complaint. Denies nausea/vomiting, fever/chills, shortness of breath or chest pain. Chest x-ray done this morning showing stable appearance of right pleural effusion. Spoke with pulmonology (Dr. Juan C Carmona) this AM; will discuss utility of thoracentesis vs. Continued diuresis with patient today. Will hold heparin in case patient decides on therapeutic thoracentesis. MEDICATIONS:   Scheduled Meds:   allopurinol  100 mg Oral Daily    aspirin  81 mg Oral Daily    atorvastatin  40 mg Oral QAM    diltiazem  120 mg Oral Daily    escitalopram  5 mg Oral Daily    fenofibrate  54 mg Oral Daily    fluticasone  2 spray Each Nare Daily    levothyroxine  100 mcg Oral Daily    metoprolol succinate  100 mg Oral Daily    therapeutic multivitamin-minerals  1 tablet Oral Daily    vitamin D  1,000 Units Oral Daily    sodium chloride flush  10 mL Intravenous 2 times per day    furosemide  40 mg Intravenous BID    famotidine  20 mg Oral Daily      Continuous Infusions:  PRN Meds:acetaminophen, ALPRAZolam, melatonin, sodium chloride flush, magnesium hydroxide, ondansetron    Allergies: Allergies   Allergen Reactions    Lactose        PHYSICAL EXAM:       Vitals: BP (!) 154/83   Pulse 110   Temp 96 °F (35.6 °C) (Oral)   Resp 16   Ht 5' 6\" (1.676 m)   Wt 124 lb 9 oz (56.5 kg)   SpO2 96%   BMI 20.10 kg/m²     I/O:      Intake/Output Summary (Last 24 hours) at 4/15/2019 1247  Last data filed at 4/15/2019 1150  Gross per 24 hour   Intake 800 ml   Output 1575 ml   Net -775 ml     I/O this shift:  In: 320 [P.O.:320]  Out: 325 [Urine:325]  I/O last 3 completed shifts:   In: 810 [P.O.:810]  Out: 1500 [Urine:1500]    Physical Examination:   · General appearance: Appears comfortable at rest, fully alert and orientated    · HEENT: Atraumatic, normocephalic, moist mucus membranes  · Respiratory: Normal respiratory effort. No wheezes, rubs, or crackles. decreased breath sounds on the right  · Cardiovascular: regular S1/S2, with no Murmur, rub or gallop. · Abdomen: Soft, non-tender, non-distended  · Musculoskeletal: No clubbing, cyanosis, no lower extremity edema, peripheral pulses present, cap refill < 2sec  · Neurologic: Neurovascularly grossly intact without any focal motor deficits. Cranial nerves:  grossly non-focal.      No results for input(s): PHART, ECO2CMK, PO2ART in the last 72 hours. DATA:       Labs  CBC:   Recent Labs     04/13/19  0507 04/14/19  0451 04/15/19  0455   WBC 5.2 5.9 5.1   HGB 10.0* 10.1* 9.8*   HCT 30.1* 30.8* 29.7*    190 202       BMP:   Recent Labs     04/13/19  0507 04/14/19  0451 04/15/19  0455    140 142   K 4.2 3.5 4.8   CL 99 98* 101   CO2 30 29 30   BUN 47* 51* 54*   CREATININE 2.4* 2.4* 2.3*   GLUCOSE 98 143* 112*     LFT's: No results for input(s): AST, ALT, ALB, BILITOT, ALKPHOS in the last 72 hours. Troponin:   Recent Labs     04/12/19  1457 04/12/19  2124   TROPONINI 0.04* 0.03*     BNP: No results for input(s): BNP in the last 72 hours. ABGs: No results for input(s): PHART, JCK3OPN, PO2ART in the last 72 hours. INR:   Recent Labs     04/12/19  1457   INR 1.18*     Lipids: No results for input(s): CHOL, HDL in the last 72 hours. Invalid input(s): LDLCALCU    U/A:  No results for input(s): NITRITE, COLORU, PHUR, LABCAST, WBCUA, RBCUA, MUCUS, TRICHOMONAS, YEAST, BACTERIA, CLARITYU, SPECGRAV, LEUKOCYTESUR, UROBILINOGEN, BILIRUBINUR, BLOODU, GLUCOSEU, AMORPHOUS in the last 72 hours.     Invalid input(s): Socorro Mcdermott     ASSESSMENT AND PLAN:   Stella Arellano is a 80 y.o. male with PMH of CHF, CAD s/p CABG and defib placement, HTN, HLD, hypothyroidism, Afib, CKD who presents with dyspnea on exertion.     Dyspnea 2/2 R pleural effusion and CHF exacerbation  - likely recurrence since last admission 3/2019. Tamiko Rhodes No signs of infection other than mild cough. No leukocytosis. Afebrile. Last Echo showed afib with EF around 50%. Fluid analysis 3/2019. , protein 7.5. Pleural LD was 130 and protein was 3.1 so it did not meet criteria for exudative effusion. - CT chest: with consolidation and large pleural effusion  - attempting diuresis with lasix  - CXR tomorrow AM  - will likely need thoracentesis, however on plavix. Will hold and plan for thoracentesis on monday    Acute on chronic CHFpEF - last echo with EF 50%. Pro-bnp elevated to 17k from 6k 2 months ago  - Continue Lasix 40 mg bid   - Echocardiogram being done today; will f/u results      Hydrothorax - Repeat CXR showing stable transudate. - Spoke with pulmonology (Dr. Juan C Carmona) this AM; will discuss utility of thoracentesis with patient     CAD - s/p CABG  - continue ASA  - holding plavix for now     Hx of Afib - currently in V paced sinus rhythm  - continue BB and diltiazem     HLD  - continue statin     HTN   - continue toprol XL     Hypothyroidism  - continue home synthroid     Anxiety  - continue home meds     Will discuss with attending physician Dr. Nida Ware     Code Status: Limited okay to resuscitative meds only. FEN: cardiac diet  PPX: Holding heparin today for possible thoracentesis   DISPO: IP    Will discuss with attending, Dr. Kenyatta Taylor MD.   -----------------------------  Adryan Ruff MD, PGY-1  4/15/2019     Addendum to Resident H& P/Progress note:  I have personally seen,examined and evaluated the patient. I have reviewed the current history, physical findings, labs and assessment and plan and agree with note as documented by resident MD ( Ama Jewell)    2d echo:  Left ventricular cavity size is normal. There is mild-moderate concentric   left ventricular hypertrophy.  Left ventricular function is mildly reduced   with ejection fraction estimated at 45-50%. Septal and apical wall   hypokinesis is present. Indeterminate diastolic function due to afib.   Mild to moderate mitral regurgitation is present.   Moderate tricuspid regurgitation.  IVC size is normal (<2.1cm) and collapses <50% with respiration consistent   with mildly elevated RA pressure (8mmHg). Estimated pulmonary artery   systolic pressure is at 47 mmHg assuming a right atrial pressure of 8 mmHg.     Discussed the patient's condition and treatment options with the pulmonologist, Dr. Wade Rae. The patient will be started on IV Lasix at 10 mg/h drip.  Will proceed with a right sided thoracentesis if Lasix IV drip is not effective in relieving right-sided large pleural effusion    + CKD stage 4    Malissa Jensen MD, [facility]

## 2019-04-15 NOTE — PLAN OF CARE
Problem: Fluid Volume:  Description  [TRUNCATED] Hyponatremia indicates a relatively greater amount of water to sodium in plasma. In hypovolemia, a deficit of both total body water and sodium exists but relatively less water deficit. Euvolemia represents near normal total body sodium co .. Sophia Box [TRUNCATED] Hyponatremia indicates a relatively greater amount of water to sodium in plasma. In hypovolemia, a deficit of both total body water and sodium exists but relatively less water deficit. Euvolemia represents near normal total body sodium co ... Goal: Ability to maintain a balanced intake and output will improve  Description  Ability to maintain a balanced intake and output will improve  Outcome: Ongoing  Note:   Patient's EF (Ejection Fraction) is greater than 40%    Patient has a past medical history of Anemia, Anxiety, Atrial fibrillation (HCC), CAD (coronary artery disease), CHF (congestive heart failure) ([address] Chronic renal insufficiency, Gout, History of BPH, Hyperlipidemia, Hypertension, and Hypothyroidism. Comorbidities reviewed and education provided. Patient and family's stated goal of care: better understand heart failure and disease management prior to discharge    Patient's current functional capacity:  Slight limitation of physical activity. Comfortable at rest. Ordinary physical activity results in fatigue, palpitation, dyspnea. Pt resting in bed at this time on 0L O2. Pt with complaints of shortness of breath. Pt without lower extremity edema.  Patient's weights and intake/output reviewed:    Patient Vitals for the past 96 hrs (Last 3 readings):   Weight   04/14/19 0351 124 lb 9 oz (56.5 kg)   04/13/19 0509 125 lb 7.1 oz (56.9 kg)   04/12/19 1539 127 lb 13.9 oz (58 kg)       Intake/Output Summary (Last 24 hours) at 4/15/2019 0242  Last data filed at 4/15/2019 1890  Gross per 24 hour   Intake 1010 ml   Output 1650 ml   Net -640 ml       Patient provided with education on CHF signs/symptoms, causes, discharge medications, daily weights, low sodium diet, activity, and follow-up. Notified patient to call the doctor post discharge if patient experiences shortness of breath, chest pain, swelling, cough, or weight gain of three pounds in a day/five pounds in a week. Also notified patient to call the doctor with dizziness, increased fatigue, decreased or difficulty urinating. Pt verbalized understanding. No additional questions at this time.     Education Time: 10 Minutes

## 2019-04-15 NOTE — PLAN OF CARE
Problem: Falls - Risk of:  Goal: Will remain free from falls  Description  Will remain free from falls  Outcome: Ongoing   Remains free from falls and accidental injury. Assessed as high fall risk, all appropriate precautions in place, utilizing call light appropriately for needs. Will monitor. Problem: Fluid Volume:  Goal: Ability to maintain a balanced intake and output will improve  Description  Ability to maintain a balanced intake and output will improve  4/15/2019 1509 by Rolando Flores  Outcome: Ongoing  Pt is at risk for dehydration d/t lasix drip. Pt is Maintaining hydration by drinking clear fluids frequently. Pt is instructed to call if symptoms worsen, severe weakness or fainting occurs, and feelings of fatigue    4/15/2019 0242 by Suzette Manzanares RN  Outcome: Ongoing  Note:   Patient's EF (Ejection Fraction) is greater than 40%    Patient has a past medical history of Anemia, Anxiety, Atrial fibrillation ([address] CAD (coronary artery disease), CHF (congestive heart failure) ([facility] [address] Chronic renal insufficiency, Gout, History of BPH, Hyperlipidemia, Hypertension, and Hypothyroidism. Comorbidities reviewed and education provided. Patient and family's stated goal of care: better understand heart failure and disease management prior to discharge    Patient's current functional capacity:  Slight limitation of physical activity. Comfortable at rest. Ordinary physical activity results in fatigue, palpitation, dyspnea. Pt resting in bed at this time on 0L O2. Pt with complaints of shortness of breath. Pt without lower extremity edema.  Patient's weights and intake/output reviewed:    Patient Vitals for the past 96 hrs (Last 3 readings):   Weight   04/14/19 0351 124 lb 9 oz (56.5 kg)   04/13/19 0509 125 lb 7.1 oz (56.9 kg)   04/12/19 1539 127 lb 13.9 oz (58 kg)       Intake/Output Summary (Last 24 hours) at 4/15/2019 0242  Last data filed at 4/15/2019 0219  Gross per 24 hour   Intake 1010 ml   Output 1650 ml   Net -640 ml       Patient provided with education on CHF signs/symptoms, causes, discharge medications, daily weights, low sodium diet, activity, and follow-up. Notified patient to call the doctor post discharge if patient experiences shortness of breath, chest pain, swelling, cough, or weight gain of three pounds in a day/five pounds in a week. Also notified patient to call the doctor with dizziness, increased fatigue, decreased or difficulty urinating. Pt verbalized understanding. No additional questions at this time.     Education Time: 10 Minutes

## 2019-04-16 NOTE — FLOWSHEET NOTE
04/16/19 1624   Encounter Summary   Services provided to: Patient   Referral/Consult From: Rounding   Continue Visiting   (4/16, filippo )   Complexity of Encounter Low   Length of Encounter 15 minutes   Routine   Type Initial   Assessment Sleeping

## 2019-04-16 NOTE — CARE COORDINATION
Case Management Admission Assessment     2019  [place] [address] Case Management Department    Patient: Robert Cole  MRN: [id number] / : 1925  ACCT: [de-identified]   Emergency Contacts  Healthcare Agent Appointed: [address] [place] [address] Agent's Name: Yanet Ha  Healthcare Agent's Phone Number: [phone]    Admission Documentation  Attending Provider: Drea Ferrell MD  Admit date/time: 2019  9:18 AM  Status: Inpatient [101]  Diagnosis: Hydropneumothorax     Readmission within last 30 days:  no       SW following for discharge planning. Patient is a 81 yo male who was admitted for hydropneumothorax. Patient has a history of CHF, CAD, HTN, CKD, HLD, and afib. PTA patient living at [facility] of [address] [place] [address] Rd living with his wife-Saw. Patient independent in ADL's, facility assist with medications and he uses his walker/cane for ambulation. Patient states they have been there coming up on three years and are happy with the facility. PT/OT evals pending; DIL at bedside request [facility] for therapy at discharge. No preference in agency. Patient son or DIL will transport patient at discharge. SW to await therapy recommendations and follow up with assisted.        PTA Living Situation  Discharge Planning  Living Arrangements: Spouse/Significant Other  Support Systems: Spouse/Significant Other, Family Members  Potential Assistance Needed: Skilled Nursing Facility  Type of Home Care Services: Aide Services  Patient expects to be discharged to[de-identified] Home  Expected Discharge Date: 19    Service Assessment       Values / Beliefs  Do you have any ethnic, cultural, sacramental, or spiritual Congregational needs you would like us to be aware of while you are in the hospital?: No    Advance Directives (For Healthcare)  Pre-existing DNR Comfort Care/DNR Arrest/DNI Order: No  Healthcare Directive: Yes, patient has an advance directive for healthcare treatment  Healthcare Agent Appointed: [address] Agent's Name: Gorge Adrian  Healthcare Agent's Phone Number: [phone]  If you are unable to speak for yourself, does your Healthcare Agent or Legal Spokesperson know your healthcare wishes?: Yes    Durable Medical Equipment   Brice walker, straight cane, shower chair, grab bars, alert button    Home Health/Skilled Nursing   Home care at home? No      Home Medical Care  Medication assistance  Pharmacy: [facility] fills medications  Potential Assistance Purchasing Medications:  No  Does patient want to participate in local refill/meds to beds program?: No    Discharge Plan   Patient expects to be discharged to: [facility]    Barriers to discharge: IV lasix, possible thorcentesis    Role of Case Management discussed with patient/family/designee.      MARCELO Maciel, [facility]  Case Management Department  Ph: [phone]

## 2019-04-16 NOTE — PLAN OF CARE
Pt resting in bed, voiding per urinal. Lasix gtt infusing at ordered rate. Denies cramping, dizziness, lightheadedness. BP low, but within normal parameters, asymptomatic for hypotension. Call light and bedside items within reach, bed alarm on and high fall risk precautions in place. Denies pain at this time. No shortness of breath, RR easy, even. Will update as needed.

## 2019-04-16 NOTE — PROGRESS NOTES
Pulmonary Followup Note    Indication for visit: Pleural effusion, dyspnea on exertion    Subjective: Patient had a net diuresis of 436 mL on Lasix 10 mg/hr but states he is peeing a lot. Weight today was 122 pounds, down 2 pounds from . He thinks he is breathing better but has not walked yet today       docusate sodium  100 mg Oral Daily    allopurinol  100 mg Oral Daily    aspirin  81 mg Oral Daily    atorvastatin  40 mg Oral QAM    diltiazem  120 mg Oral Daily    escitalopram  5 mg Oral Daily    fenofibrate  54 mg Oral Daily    fluticasone  2 spray Each Nare Daily    levothyroxine  100 mcg Oral Daily    metoprolol succinate  100 mg Oral Daily    therapeutic multivitamin-minerals  1 tablet Oral Daily    vitamin D  1,000 Units Oral Daily    sodium chloride flush  10 mL Intravenous 2 times per day    famotidine  20 mg Oral Daily       Continuous Infusions:   furosemide (LASIX) 1mg/ml infusion 10 mg/hr (04/15/19 2326)       ROS: No fever or headache    PHYSICAL EXAMINATION:  /68   Pulse 102   Temp 98.1 °F (36.7 °C) (Oral)   Resp 18   Ht 5' 6\" (1.676 m)   Wt 122 lb 5.7 oz (55.5 kg)   SpO2 95%   BMI 19.75 kg/m²   T-max 98.7   on room air  Gen: NAD. Speaking in full sentences with out using accessory resp muscles  HEENT: PERRL, EOMI, OP nl  Lung: Decreased BS bases  CV: RRR without M/R/R  Abd: +BS, soft, NT/ND  Ext: No edema. DATA  CBC:   Recent Labs     04/14/19  0451 04/15/19  0455 04/16/19  0457   WBC 5.9 5.1 5.8   HGB 10.1* 9.8* 10.2*   HCT 30.8* 29.7* 30.5*   MCV 93.1 92.4 93.4    202 200     BMP:   Recent Labs     04/14/19  0451 04/15/19  0455 04/16/19  0457    142 140   K 3.5 4.8 4.3   CL 98* 101 97*   CO2 29 30 30   BUN 51* 54* 51*   CREATININE 2.4* 2.3* 2.4*     VB     Radiology Review:  All pertinent images / reports were reviewed as a part of this visit.  CXR 4/15 reveals the following:  EXAM: PA AND LATERAL CHEST X-RAY       INDICATION: , Pleural effusion follow-up.       COMPARISON: CT chest dated 4/12/2019.       FINDINGS: Redemonstration of pleural parenchymal disease in the right lower hemithorax.       The left lung appears to be clear.       Evidence of surgery in the mediastinum. The left-sided pacemaker with its lead projecting in the right atrium and right ventricle are redemonstrated.       Severe osteopenia is present with multilevel disc degenerative changes in the spine.           Impression       Stable appearance of the right basilar pleural parenchymal disease may represent any combination of pleural effusion with atelectasis/infiltrate. Follow-up to document resolution.      CT Chest 4/12      FINDINGS:       Lungs: Bronchial wall thickening. Mucus impaction right lower lobe bronchioles. Mild centrilobular emphysema. Consolidation and volume loss of right lower lobe increased compared to CT chest from 3/5/2019. Mild dependent consolidation ground glass    opacity of the posterior right upper lobe. Mild interlobular septal thickening lungs. Mild consolidation and groundglass opacity dependently in the left lower lobe. Calcified granuloma left upper lobe on series 2 image 39. No suspicious pulmonary nodules    or masses. Scarring lung apices.       Heart & great vessels: Cardiac conduction device. Coronary artery bypass graft. Coronary and thoracic aortic calcification. Carotid artery calcification. Mediastinal surgical clips.       Pleura & pericardium: Large right pleural effusion slightly increased compared to CT chest 3/5/2019. Small to moderate left pleural effusion. No pneumothorax.       Lymph nodes: No mediastinal, hilar, or axillary lymphadenopathy.       Chest wall & lower neck: Normal.       Upper abdomen: Normal.       Musculoskeletal: Degenerative changes spine with prominent Schmorl's nodes. Sternotomy wires. Canal stenosis of the lower thoracic spine at level of T9-10 and T10-11.  Curvature thoracic spine convex to right. [place] screening right greater tuberosity.           Impression       1. Consolidation right lobe increased. 2. Large right pleural effusion increased. 3. Small to moderate left pleural effusion. 4. Centrilobular emphysema. 5. No evidence of pneumothorax.      Echo:  Summary   Left ventricular cavity size is normal. There is mild-moderate concentric   left ventricular hypertrophy. Left ventricular function is mildly reduced   with ejection fraction estimated at 45-50%. Septal and apical wall   hypokinesis is present. Indeterminate diastolic function due to afib.   Mild to moderate mitral regurgitation is present.   Moderate tricuspid regurgitation.  IVC size is normal (<2.1cm) and collapses <50% with respiration consistent   with mildly elevated RA pressure (8mmHg). Estimated pulmonary artery   systolic pressure is at 47 mmHg assuming a right atrial pressure of 8 mmHg.     Assessment       1. Moderate right transudative pleural effusion   2. HFpEF  3. BATEMAN  4. CKD     Plan     Cont lasix at current dose. Walk Hattielionel Seen and see if he has BATEMAN.  If so then can increase lasix gtt or pursue Thora but not before tomorrow given last plavix dose 4 days ago    Nelda Marrero

## 2019-04-16 NOTE — PROGRESS NOTES
Progress Note    Hospital Day: 5                                                         Code:Limited  Admit Date: 4/12/2019                                 PCP: Sam Aparicio MD                     Daily Plan:  4/16/2019    Subjective:   Lb Mccann. Patient feels well this morning. He is unsure if he feels improved because he hasn't ambulated today. He has no chest pain or abdominal pain. He does complain of constipation - will add bowel regimen. Will consult PT/OT to prevent deconditioning. Encouraged patient to ambulate today. Dr. Raffaele Saldana suggests lasix gtt for 24-48 hours before considering another thoracentesis, given his thoracentesis in March caused an iatrogenic pneumo. Pt's son is a physician and the plan was discussed with him. 24 hr I/Os: 7684/4860 (-246)    MEDICATIONS:   Scheduled Meds:   docusate sodium  100 mg Oral Daily    allopurinol  100 mg Oral Daily    aspirin  81 mg Oral Daily    atorvastatin  40 mg Oral QAM    diltiazem  120 mg Oral Daily    escitalopram  5 mg Oral Daily    fenofibrate  54 mg Oral Daily    fluticasone  2 spray Each Nare Daily    levothyroxine  100 mcg Oral Daily    metoprolol succinate  100 mg Oral Daily    therapeutic multivitamin-minerals  1 tablet Oral Daily    vitamin D  1,000 Units Oral Daily    sodium chloride flush  10 mL Intravenous 2 times per day    famotidine  20 mg Oral Daily      Continuous Infusions:   furosemide (LASIX) 1mg/ml infusion 10 mg/hr (04/15/19 9372)     PRN Meds:polyethylene glycol, acetaminophen, ALPRAZolam, melatonin, sodium chloride flush, magnesium hydroxide, ondansetron    Allergies:    Allergies   Allergen Reactions    Lactose        PHYSICAL EXAM:       Vitals: /66   Pulse 100   Temp 98.2 °F (36.8 °C) (Oral)   Resp 18   Ht 5' 6\" (1.676 m)   Wt 122 lb 5.7 oz (55.5 kg)   SpO2 93%   BMI 19.75 kg/m²     I/O:      Intake/Output Summary (Last 24 hours) at 4/16/2019 0958  Last data filed at 4/16/2019 0600  Gross per 24 hour Intake 718.4 ml   Output 1475 ml   Net -756.6 ml     No intake/output data recorded. I/O last 3 completed shifts: In: 1038.4 [P.O.:900; I.V.:138.4]  Out: 1475 [Urine:1475]    Physical Examination:   · General appearance: Appears comfortable at rest, fully alert and orientated    · HEENT: Atraumatic, normocephalic, moist mucus membranes  · Respiratory: Normal respiratory effort. No wheezes, rubs, or crackles. decreased breath sounds on the right more towards the mid-lower lung  · Cardiovascular: Regular S1/S2, with no Murmur, rub or gallop. · Abdomen: Soft, non-tender, non-distended  · Musculoskeletal: No clubbing, cyanosis, no lower extremity edema, peripheral pulses present, cap refill < 2sec  · Neurologic: Neurovascularly grossly intact without any focal motor deficits. Cranial nerves:  grossly non-focal.      No results for input(s): PHART, NRN9XCO, PO2ART in the last 72 hours. DATA:       Labs  CBC:   Recent Labs     04/14/19  0451 04/15/19  0455 04/16/19  0457   WBC 5.9 5.1 5.8   HGB 10.1* 9.8* 10.2*   HCT 30.8* 29.7* 30.5*    202 200       BMP:   Recent Labs     04/14/19  0451 04/15/19  0455 04/16/19  0457    142 140   K 3.5 4.8 4.3   CL 98* 101 97*   CO2 29 30 30   BUN 51* 54* 51*   CREATININE 2.4* 2.3* 2.4*   GLUCOSE 143* 112* 105*     LFT's: No results for input(s): AST, ALT, ALB, BILITOT, ALKPHOS in the last 72 hours. Troponin:   No results for input(s): TROPONINI in the last 72 hours. BNP: No results for input(s): BNP in the last 72 hours. ABGs: No results for input(s): PHART, OIM1JXM, PO2ART in the last 72 hours. INR:   No results for input(s): INR in the last 72 hours. Lipids: No results for input(s): CHOL, HDL in the last 72 hours.     Invalid input(s): LDLCALCU    U/A:  No results for input(s): NITRITE, COLORU, PHUR, LABCAST, [address], RBCUA, MUCUS, TRICHOMONAS, YEAST, BACTERIA, CLARITYU, SPECGRAV, LEUKOCYTESUR, UROBILINOGEN, BILIRUBINUR, BLOODU, GLUCOSEU, AMORPHOUS in the last 72 hours. Invalid input(s): Luis Angel Garrett     ASSESSMENT AND PLAN:   Chi Kan is a 80 y.o. male with PMH of CHF, CAD s/p CABG and defib placement, HTN, HLD, hypothyroidism, Afib, CKD who presents with dyspnea on exertion.     Dyspnea 2/2 R Hydrothorax 2/2 CHF - likely recurrence since last admission 3/2019. Tamiko Congo No signs of infection other than mild cough. No leukocytosis. Afebrile. Last Echo showed afib with EF around 50%. Fluid analysis 3/2019. , protein 7.5. Pleural LD was 130 and protein was 3.1 so it did not meet criteria for exudative effusion. - CT chest: shows large R pleural effusion  - lasix gtt @ 10 for 24-48 hrs and then will re-evaluate for possible thoracentesis   -risk of causing another iatrogenic pneumo (as this happened in March)   -hold plavix for possible thoracentesis    Acute on chronic combined systolic and diastolic CHF - last echo with EF 50%. Pro-bnp elevated to 17k from 6k 2 months ago  - lasix gtt @ 10  -daily weights  -strict I/Os  -low Na diet (<2 gm)  - Echocardiogram: EF 45-50%, septal and apical wall hypokinesis, indeterminate diastolic fxn     CAD - s/p CABG  - continue ASA  - holding plavix for now for possible thoracentesis     Hx of Afib - currently in V paced sinus rhythm  - continue BB and diltiazem     HLD  - continue statin     HTN   - continue toprol XL     Hypothyroidism  - continue home synthroid     Anxiety  - continue home meds     Will discuss with attending physician Dr. Dietra Sandifer     Code Status: Limited okay to resuscitative meds only. FEN: cardiac diet, low sodium  PPX: if no thoracentesis today, start heparin 5000U q 8 hr  DISPO: IP    Will discuss with attending, Dr. Guille Segovia MD.   -----------------------------  Chapo Matos, PGY-1  Reached via PerfectServe    Addendum to Resident H& P/Progress note:  I have personally seen,examined and evaluated the patient.  I have reviewed the current history, physical findings, labs and assessment and plan and agree with note as documented by resident MD ( Ashley Agrawal)  Discussed the patient's condition with Dr. Suman Florez, pulmonology. Will continue IV Lasix at 10 mg/h drip. The patient will undergo right sided thoracentesis tomorrow if pleural effusion remains large. Ambulation was strongly encouraged.     Nevaeh Sales MD, FACP

## 2019-04-17 PROBLEM — R06.09 DOE (DYSPNEA ON EXERTION): Status: ACTIVE | Noted: 2017-12-11

## 2019-04-17 NOTE — PROGRESS NOTES
Physical Therapy    Facility/Department: [place] [address] [facility]  Initial Assessment    NAME: Gabi Arauz  : 1925  MRN: [id number]    Date of Service: 2019    Discharge [address] scored a 20/24 on the AM-PAC short mobility form. Current research shows that an AM-PAC score of 18 or greater is typically associated with a discharge to the patient's home setting. Based on the patients AM-PAC score and their current functional mobility deficits, it is recommended that the patient have 2-3 sessions per week of Physical Therapy at d/c to increase the patients independence. PT Equipment Recommendations  Equipment Needed: No    Assessment   Assessment: Pt demo mobility at or close to his functional baseline and he plans to return home with assist as before. If home, recommend 24 hour assist initially, home PT safety eval, use of rolling walker (has). Treatment Diagnosis: moblity impairment due to PNA  Decision Making: Low Complexity  Patient Education: Pt educated on PT role, importance of OOB mobility, home dispo and he verbalized understanding. REQUIRES PT FOLLOW UP: Yes  Activity Tolerance  Activity Tolerance: Patient limited by endurance; Patient Tolerated treatment well       Patient Diagnosis(es): The primary encounter diagnosis was Pleural effusion on right. A diagnosis of Acute on chronic congestive heart failure, unspecified heart failure type [facility]) was also pertinent to this visit. has a past medical history of Anemia, Anxiety, Atrial fibrillation (HCC), CAD (coronary artery disease), CHF (congestive heart failure) ([address] Chronic renal insufficiency, Gout, History of BPH, Hyperlipidemia, Hypertension, and Hypothyroidism. has a past surgical history that includes Pacemaker insertion (); Coronary artery bypass graft (); shoulder surgery; Colon surgery (); and Cardiac defibrillator placement.     Restrictions  Position Activity Restriction  Other position/activity Contact guard assistance(x3 trials; occ vc for hand placement/safety)     Ambulation  Device: Rolling Walker  Assistance: Contact guard assistance  Quality of Gait: small, quick steps with wide ESTEFANIA; no overt LOB noted; pt fatigued quickly  Distance: 60 ft, 20 ft  Comments: Pt reports that he feels like he is at his baseline for walking. Balance  Sitting - Static: Good  Sitting - Dynamic: Good  Standing - Static: Fair(with B UE support of rolling walker)  Standing - Dynamic: Fair(with BUE support of rolling walker)        Plan   Plan  Times per week: 2-5  Current Treatment Recommendations: Endurance Training, Transfer Training, Gait Training, Functional Mobility Training  Safety Devices  Type of devices: Nurse notified, Call light within reach, Chair alarm in place, Left in chair                        AM-PAC Score  AM-PAC Inpatient Mobility Raw Score : 20  AM-PAC Inpatient T-Scale Score : 47.67  Mobility Inpatient CMS 0-100% Score: 35.83  Mobility Inpatient CMS G-Code Modifier : CJ          Goals  Short term goals  Time Frame for Short term goals: discharge  Short term goal 1: sit to/from stand supervision  Short term goal 2: ambulate 100 ft with rolling walker supervision  Patient Goals   Patient goals : return to [facility]       Therapy Time   Individual Concurrent Group Co-treatment   Time In 0840         Time Out 0920         Minutes 40           Timed Code Treatment Minutes: 30      Total Treatment Minutes:  40    This note will serve as a discharge summary if patient is discharged from hospital before next treatment session.        Saritha Taylor, PT

## 2019-04-17 NOTE — PLAN OF CARE
Pt resting in bed, using call light appropriately. Call light and bedside items within reach. Repositioning independently. No new s/s of skin breakdown. Voiding per urinal. Lasix gtt infusing, denies cramping, lightheadedness/dizziness. Will update as needed.

## 2019-04-17 NOTE — PROGRESS NOTES
Pulmonary Followup Note    Indication for visit: Pleural effusion, dyspnea on exertion    Subjective: Patient had a net diuresis of 1700 mL in last 24 hrs on Lasix 10 mg/hr. Weight today was 120 pounds, down 2 pounds from yesterday. He walked to the nurse's station this morning and felt that his breathing was quite improved.  docusate sodium  100 mg Oral Daily    heparin (porcine)  5,000 Units Subcutaneous BID    allopurinol  100 mg Oral Daily    aspirin  81 mg Oral Daily    atorvastatin  40 mg Oral QAM    diltiazem  120 mg Oral Daily    escitalopram  5 mg Oral Daily    fenofibrate  54 mg Oral Daily    fluticasone  2 spray Each Nare Daily    levothyroxine  100 mcg Oral Daily    metoprolol succinate  100 mg Oral Daily    therapeutic multivitamin-minerals  1 tablet Oral Daily    vitamin D  1,000 Units Oral Daily    sodium chloride flush  10 mL Intravenous 2 times per day    famotidine  20 mg Oral Daily       Continuous Infusions:   furosemide (LASIX) 1mg/ml infusion 10 mg/hr (19 0734)     ROS: No fever or headache    PHYSICAL EXAMINATION:  /66   Pulse 108   Temp 97 °F (36.1 °C) (Oral)   Resp 18   Ht 5' 6\" (1.676 m)   Wt 120 lb 5.9 oz (54.6 kg)   SpO2 96%   BMI 19.43 kg/m²   T-max 98.7   on room air  Gen: NAD. Speaking in full sentences with out using accessory resp muscles  HEENT: PERRL, EOMI, OP nl  Lung: Decreased BS bases  CV: RRR without M/R/R  Abd: +BS, soft, NT/ND  Ext: No edema.     DATA  CBC:   Recent Labs     04/15/19  0455 04/16/19  0457 04/17/19  0457   WBC 5.1 5.8 5.5   HGB 9.8* 10.2* 10.4*   HCT 29.7* 30.5* 31.4*   MCV 92.4 93.4 91.9    200 222     BMP:   Recent Labs     04/15/19  0455 04/16/19  0457 04/17/19  0457    140 137   K 4.8 4.3 4.0    97* 92*   CO2 30 30 32   BUN 54* 51* 57*   CREATININE 2.3* 2.4* 2.6*     VB.46/43     Radiology Review:  All pertinent images / reports were reviewed as a part of

## 2019-04-17 NOTE — PROGRESS NOTES
Progress Note    Hospital Day: 6                                                         Code:Limited  Admit Date: 4/12/2019                                 PCP: Tomy Nix MD                     Daily Plan:  4/17/2019    Subjective:   Para Garre. Patient feels well this morning. He looks forward to working with PT/OT and ambulating to see if he can tell a difference in his breathing. No BM yet - will adjust bowel regiment. No abdominal pain or chest pain. He is not having respiratory distress while resting in bed. 24 hr I/Os: 477/2200 (-1.7L), negative 4L since admit  Weight 120 lb today, 122 yesterday    MEDICATIONS:   Scheduled Meds:   docusate sodium  100 mg Oral Daily    heparin (porcine)  5,000 Units Subcutaneous BID    allopurinol  100 mg Oral Daily    aspirin  81 mg Oral Daily    atorvastatin  40 mg Oral QAM    diltiazem  120 mg Oral Daily    escitalopram  5 mg Oral Daily    fenofibrate  54 mg Oral Daily    fluticasone  2 spray Each Nare Daily    levothyroxine  100 mcg Oral Daily    metoprolol succinate  100 mg Oral Daily    therapeutic multivitamin-minerals  1 tablet Oral Daily    vitamin D  1,000 Units Oral Daily    sodium chloride flush  10 mL Intravenous 2 times per day    famotidine  20 mg Oral Daily      Continuous Infusions:   furosemide (LASIX) 1mg/ml infusion 10 mg/hr (04/17/19 0734)     PRN Meds:polyethylene glycol, acetaminophen, ALPRAZolam, melatonin, sodium chloride flush, magnesium hydroxide, ondansetron    Allergies: Allergies   Allergen Reactions    Lactose        PHYSICAL EXAM:       Vitals: /79   Pulse 111   Temp 97 °F (36.1 °C) (Oral)   Resp 14   Ht 5' 6\" (1.676 m)   Wt 120 lb 5.9 oz (54.6 kg)   SpO2 91%   BMI 19.43 kg/m²     I/O:      Intake/Output Summary (Last 24 hours) at 4/17/2019 1029  Last data filed at 4/17/2019 0949  Gross per 24 hour   Intake 1077.6 ml   Output 2100 ml   Net -1022.4 ml     I/O this shift:   In: 720 [P.O.:720]  Out: 200 [BMPLP:267]  I/O last 3 completed shifts: In: 477.6 [P.O.:240; I.V.:237.6]  Out: 2200 [Urine:2200]    Physical Examination:   · General appearance: Appears comfortable at rest, fully alert and orientated    · HEENT: Atraumatic, normocephalic, moist mucus membranes  · Respiratory: Normal respiratory effort. No wheezes, rubs, or crackles. decreased breath sounds on the right more towards the mid-lower lung- improved  · Cardiovascular: Regular S1/S2, with no Murmur, rub or gallop. · Abdomen: Soft, non-tender, non-distended  · Musculoskeletal: No clubbing, cyanosis, no lower extremity edema, peripheral pulses present, cap refill < 2sec  · Neurologic: Neurovascularly grossly intact without any focal motor deficits. Cranial nerves:  grossly non-focal.      No results for input(s): PHART, DZQ2YGT, PO2ART in the last 72 hours. DATA:       Labs  CBC:   Recent Labs     04/15/19  0455 04/16/19  0457 04/17/19  0457   WBC 5.1 5.8 5.5   HGB 9.8* 10.2* 10.4*   HCT 29.7* 30.5* 31.4*    200 222       BMP:   Recent Labs     04/15/19  0455 04/16/19  0457 04/17/19  0457    140 137   K 4.8 4.3 4.0    97* 92*   CO2 30 30 32   BUN 54* 51* 57*   CREATININE 2.3* 2.4* 2.6*   GLUCOSE 112* 105* 116*     LFT's: No results for input(s): AST, ALT, ALB, BILITOT, ALKPHOS in the last 72 hours. Troponin:   No results for input(s): TROPONINI in the last 72 hours. BNP: No results for input(s): BNP in the last 72 hours. ABGs: No results for input(s): PHART, [id number], PO2ART in the last 72 hours. INR:   No results for input(s): INR in the last 72 hours. Lipids: No results for input(s): CHOL, HDL in the last 72 hours. Invalid input(s): LDLCALCU    U/A:  No results for input(s): NITRITE, COLORU, PHUR, LABCAST, WBCUA, RBCUA, MUCUS, TRICHOMONAS, YEAST, BACTERIA, CLARITYU, SPECGRAV, LEUKOCYTESUR, UROBILINOGEN, BILIRUBINUR, BLOODU, GLUCOSEU, AMORPHOUS in the last 72 hours.     Invalid input(s): Charan Weston     ASSESSMENT AND PLAN: Gabe Avila is a 80 y.o. male with PMH of CHF, CAD s/p CABG and defib placement, HTN, HLD, hypothyroidism, Afib, CKD who presents with dyspnea on exertion.     Dyspnea 2/2 R Hydrothorax 2/2 CHF - likely recurrence since last admission 3/2019. Hershell Broach No signs of infection other than mild cough. No leukocytosis. Afebrile. Last Echo showed afib with EF around 50%. Fluid analysis 3/2019. , protein 7.5. Pleural LD was 130 and protein was 3.1 so it did not meet criteria for exudative effusion. - CT chest: shows large R pleural effusion  - lasix gtt @ 10   -will touch base with pulmonology about possible thoracentesis   -plavix has been held for 5 days  -good diuresis yesterday    Acute on chronic combined systolic and diastolic CHF - last echo with EF 50%. Pro-bnp elevated to 17k from 6k 2 months ago  - lasix gtt @ 10  -daily weights  -strict I/Os  -low Na diet (<2 gm)  - Echocardiogram: EF 45-50%, septal and apical wall hypokinesis, indeterminate diastolic fxn     CAD - s/p CABG  - continue ASA  - holding plavix for now for possible thoracentesis (day 5)     Hx of Afib - currently in V paced sinus rhythm  - continue BB and diltiazem     HLD  - continue statin     HTN   - continue toprol XL     Hypothyroidism  - continue home synthroid     Anxiety  - continue home meds     Will discuss with attending physician Dr. Saloni Lezama     Code Status: Limited okay to resuscitative meds only. FEN: cardiac diet, low sodium  PPX: heparin 5000U q 8 hrs  DISPO: IP    Will discuss with attending, Dr. Jerry Rosen MD.   -----------------------------  Terrance Wheat, PGY-1  Reached via PerfectServe    Addendum to Resident H& P/Progress note:  I have personally seen,examined and evaluated the patient. I have reviewed the current history, physical findings, labs and assessment and plan and agree with note as documented by resident MD ( Rajesh Lee)  Responded well to IV Lasix drip.   Discussed the treatment plan and the patient's condition with Dr Michele Farmer. Will continue current management. Possible discharge home tomorrow.     Xuan Puentes MD, FACP

## 2019-04-17 NOTE — PROGRESS NOTES
Occupational Therapy   Occupational Therapy Initial Assessment and Treatment  Date: 2019   Patient Name: Max Harrison  MRN: [id number]     : 1925    Date of Service: 2019    Discharge Recommendations:  Max Harrison scored a 19/24 on the AM-PAC ADL Inpatient form. Current research shows that an AM-PAC score of 18 or greater is typically associated with a discharge to the patient's home setting. Based on the patients AM-PAC score and their current ADL deficits, it is recommended that the patient have 2-3 sessions per week of Occupational Therapy at d/c to increase the patients independence. OT Equipment Recommendations  Equipment Needed: No    Assessment   Performance deficits / Impairments: Decreased endurance;Decreased ADL status; Decreased functional mobility ; Decreased high-level IADLs(Pt has to help take care of wife)  Assessment: Pt admitted d/t SOB and BATEMAN. Prior to admission pt was independent in functional mobility, transfers, and ADLs and assisted wife in ADLs; he ambulated using a rollator. [facility] offered cleaning and meal services. Pt is currently CGA and SBA in most ADLs and mobility. Pt is near baseline but would benefit from additional OT d/t limited endurance and his need to A wife with ADLs. Continue per POC. Treatment Diagnosis: Impaired ADLs and mobility d/t PNA and SOB  Prognosis: Good  Decision Making: Low Complexity  Patient Education: Educated pt on role of OT- verbalized understanding. Pt educated on importance of activity and being out of bed, that he could request to walk whenever he wanted. REQUIRES OT FOLLOW UP: Yes  Activity Tolerance  Activity Tolerance: Patient Tolerated treatment well  Activity Tolerance: Tolerated walk and asked if he could walk more at end session. Pt told he could request nursing to walk with him later in the day. Safety Devices  Safety Devices in place: Yes  Type of devices: Left in chair;Call light within reach; Chair alarm in place;Nurse notified           Patient Diagnosis(es): The primary encounter diagnosis was Pleural effusion on right. A diagnosis of Acute on chronic congestive heart failure, unspecified heart failure type [facility]) was also pertinent to this visit. has a past medical history of Anemia, Anxiety, Atrial fibrillation (HCC), CAD (coronary artery disease), CHF (congestive heart failure) ([address] Chronic renal insufficiency, Gout, History of BPH, Hyperlipidemia, Hypertension, and Hypothyroidism. has a past surgical history that includes Pacemaker insertion (2008); Coronary artery bypass graft (2001); shoulder surgery; Colon surgery (2004); and Cardiac defibrillator placement. Treatment Diagnosis: Impaired ADLs and mobility d/t PNA and SOB      Restrictions  Position Activity Restriction  Other position/activity restrictions: up with assist    Subjective   General  Chart Reviewed: Yes  Additional Pertinent Hx: 80 y.o. M with c/o of SOB and [address] Course: 4/12 EKG- unchanged from 2018 EKG; 4/12 CXR- Right hydropneumothorax with new fluid component compared to the chest x-ray from 3/6/2019, consolidation of the right mid and lower lung zones new; 4/12 CT Chest- Consolidation right lobe increased, large right pleural effusion increased, small to moderate left pleural effusion, centrilobular emphysema, no evidence of pneumothorax; 4/14 CXR- stable appearance of the right basilar pleural parenchymal disease may represent any combination of pleural effusion with atelectasis/infiltrate       PMH:  Anemia, Anxiety, Atrial fibrillation, CAD (coronary artery disease), CHF (congestive heart failure), Chronic renal insufficiency, Gout, History of BPH, Hyperlipidemia, Hypertension, and Hypothyroidism; Pacemaker insertion (2008); Coronary artery bypass graft (2001); shoulder surgery; Colon surgery (2004); and Cardiac defibrillator placement.   Family / Caregiver Present: No  Referring Practitioner: Dr. Zane Hicks  Diagnosis: Hydropneumothorax and dyspnea upon exertion   Subjective  Subjective: Pt asleep supine in bed upon entry. Agreeable to therapy once he began waking up more. \"I really want to walk around more. \"     Social/Functional History  Social/Functional History  Lives With: Spouse(Wife is blind and pt has to A her to get around home and facility; he helps her with ADLs like dressing on some days)  Type of Home: Assisted living  Home Layout: One level  Home Access: Elevator, Level entry  Bathroom Shower/Tub: Walk-in shower, Shower chair with back  [employer]: Grab bars in shower, Grab bars around toilet, Shower chair  Bathroom Accessibility: Accessible  Home Equipment: 4 wheeled walker  Receives Help From: Family, Home health(Nurses from [facility] come in prn)  ADL Assistance: Independent  Homemaking Assistance: Needs assistance(Cleaning done by [facility], go to dining room for meals)  Homemaking Responsibilities: No  Ambulation Assistance: Independent(Rollator)  Transfer Assistance: Independent  Active : Yes(Only occassionally)  Occupation: Retired       Objective   Vision: Impaired  Vision Exceptions: Wears glasses for reading  Hearing: Exceptions to [facility]  Hearing Exceptions: Bilateral hearing aid    Orientation  Overall Orientation Status: Within Functional Limits     Balance  Sitting Balance: Contact guard assistance (when assessing UB ROM - posterior LOB)  Standing Balance: Stand by assistance  Standing Balance  Activity: Standing at sink to wash hands, wash face, and brush teeth; standing at toilet for clothing management  Comment: Use of walker for stability during clothing management; held on to sink with L hand for stability for grooming tasks  Functional Mobility  Functional - Mobility Device: Rolling Walker  Activity: To/from bathroom; Other(Walk in hallway ~60 ft)  Level of Assist: CGA  Functional Mobility Comments: Pt would really like to walk more and was told to call nursing when he wants to get up.    Toilet Transfers  Equipment Used: Standard toilet(Grab bar on L)  Toilet Transfer: Contact guard assistance(Use of L grab bar and walker)  ADL  Grooming: Stand by assistance  UE Bathing: Minimal assistance(SBA for arms, face, neck, and elma care; pt requested help for washing back)  LE Bathing: Contact guard assistance  UE Dressing: Minimal assistance(2* to IV and heart monitor)  LE Dressing: Contact guard assistance (standing)  Toileting: Modified independent (Used urinal and asked for privacy; attempted BM on toilet with SBA)  Tone RUE  RUE Tone: Normotonic  Tone LUE  LUE Tone: Normotonic  Coordination  Movements Are Fluid And Coordinated: Yes     Bed mobility  Rolling to Right: Modified independent(Use of rail with HOB elevated)  Supine to Sit: Modified independent(Use of rail with HOB elevated)  Scooting: Independent  Transfers  Sit to stand: Contact guard assistance(With vc to push up from bed instead of walker)  Stand to sit: Contact guard assistance     Cognition  Overall Cognitive Status: WFL                 LUE AROM (degrees)  LUE AROM : WFL  Left Hand AROM (degrees)  Left Hand AROM: WFL  RUE AROM (degrees)  RUE AROM : WFL  RUE General AROM: Some compensation of LUE noted during shoulder flexion and ext rotation when combing hair.  Pt stated he had a previous surgery so sometimes the R arm needs \"some help\"   Right Hand AROM (degrees)  Right Hand AROM: WFL                      Plan   Plan  Times per week: 2-5  Times per day: Daily  Current Treatment Recommendations: Safety Education & Training, Balance Training, Self-Care / ADL, Endurance Training, Functional Mobility Training     AM-[facility] Score        [facility] Inpatient Daily Activity Raw Score: 19  AM-PAC Inpatient ADL T-Scale Score : 40.22  ADL Inpatient CMS 0-100% Score: 42.8  ADL Inpatient CMS G-Code Modifier : CK    Goals  Short term goals  Time Frame for Short term goals: Discharge  Short term goal 1: Pt to complete UB dressing with CGA  Short term goal 2: Pt to complete UB bathing including back with CGA  Short term goal 3: pt to complete LB dressing w/ SBA  Short term goal 4: Pt to complete 10 reps of bilateral UE AROM exercises to increase activity tolerance  Patient Goals   Patient goals : To return to [facility]       Therapy Time   Individual Concurrent Group Co-treatment   Time In 0825         Time Out 0924         Minutes 59          Time coded treatment minutes: 44    Total Treatment time: Jl 77, OTS    If pt is discharged prior to next treatment, this note to serve as discharge summary. Aniya Ellison OTR/KRYS #[id number]  Therapist was present, directed the patient's care, made skilled judgement, and was responsible for assessment and treatment of the patient.

## 2019-04-17 NOTE — FLOWSHEET NOTE
04/17/19 1938   Assessment   Charting Type Shift assessment   Neurological   Neuro (WDL) WDL   Level of Consciousness 0   Bryan Coma Scale   Eye Opening 4   Best Verbal Response 5   Best Motor Response 6   Giles Coma Scale Score 15   HEENT   HEENT (WDL) X   Right Eye Impaired vision  (reading glasses)   Left Eye Impaired vision  (reading glasses)   Right Ear Impaired hearing   Left Ear Impaired hearing   Throat Intact   Respiratory   Respiratory (WDL) X   Respiratory Pattern Regular   Respiratory Depth Normal   Respiratory Quality/Effort Unlabored   Chest Assessment Chest expansion symmetrical;Trachea midline   L Breath Sounds Clear   R Breath Sounds Fine Crackles   Breath Sounds   Right Upper Lobe Clear   Right Middle Lobe Clear   Right Lower Lobe Fine Crackles   Left Upper Lobe Clear   Left Lower Lobe Clear   Cardiac   Cardiac (WDL) X   Cardiac Regularity Irregular   Heart Sounds S1, S2   Cardiac Rhythm Atrial fibrillation;Ventricular Paced   Cardiac Monitor   Telemetry Monitor On Yes   Telemetry Audible Yes   Telemetry Alarms Set Yes   Telemetry Box Number [id number]   Telemetry Monitor Alarm Parameters 50/120   Gastrointestinal   Abdominal (WDL) WDL   Peripheral Vascular   Peripheral Vascular (WDL) WDL   Edema None   Skin Color/Condition   Skin Color/Condition (WDL) WDL   Skin Integrity   Skin Integrity (WDL) X   Skin Integrity Abrasion   Location scattered   Preventative Dressing No   Skin Fold Management No   Musculoskeletal   Musculoskeletal (WDL) WDL   Genitourinary   Genitourinary (WDL) WDL   Flank Tenderness No   Suprapubic Tenderness No   Dysuria No   Urine Assessment   Urine Color Yellow/straw   Urine Appearance Clear   Urine Odor No odor   Incontinence No   Anus/Rectum   Anus/Rectum (WDL) WDL   Psychosocial   Psychosocial (WDL) WDL

## 2019-04-17 NOTE — CARE COORDINATION
The [facility],[address]  CHF Team Conference Note      Patient ID: Ramón Amado 80 y.o. 9/21/1925    Admission Date: 4/12/2019   Admission Weight: 127 lb  Discharge Date: 4/18/2019  Discharge Weight: 119 lb    30 Day Readmit? No    Pt History and Precipitating Cause of Decompensation:   hx of CHF, CAD s/p CABG and defib placement, HTN, HLD, hypothyroidism, Afib, CKD who presents with dyspnea on exertion. Patient had recent (3/2019) admission due to large right pleural effusion and he had thoracentesis that relieved dyspnea at that time. He was discharged next day and he was doing well without SOB for a week or two. This time he complains of worsening dyspnea for several weeks. He becomes easily short of breath with getting up and ambulating. He has mild cough that brings up mild phlegm. Ejection Fraction: 4/15/2019 ECHO   Left ventricular cavity size is normal. There is mild-moderate concentric   left ventricular hypertrophy. Left ventricular function is mildly reduced   with ejection fraction estimated at 45-50%. Septal and apical wall   hypokinesis is present. Indeterminate diastolic function due to afib.   Mild to moderate mitral regurgitation is present.   Moderate tricuspid regurgitation.  IVC size is normal (<2.1cm) and collapses <50% with respiration consistent   with mildly elevated RA pressure (8mmHg).  Estimated pulmonary artery   systolic pressure is at 47 mmHg assuming a right atrial pressure of 8 mmHg.       Has patient been diagnosed with BHARATH: No  Is patient being treated: No    Cardiology Consulted: No  Heart Failure Order Set Used: Yes  Complete Intake and Output: Yes  Complete Daily Weights: Yes    BMP:  Lab Results   Component Value Date     04/17/2019     04/16/2019     04/15/2019    K 4.0 04/17/2019    K 4.3 04/16/2019    K 4.8 04/15/2019    K 4.0 04/12/2019    K 5.8 04/12/2019    K 3.7 03/06/2019    CL 92 04/17/2019    CL 97 04/16/2019     04/15/2019 CO2 32 04/17/2019    CO2 30 04/16/2019    CO2 30 04/15/2019    PHOS 4.0 02/04/2019    PHOS 4.3 10/24/2018    PHOS 4.9 06/25/2018    BUN 57 04/17/2019    BUN 51 04/16/2019    BUN 54 04/15/2019    CREATININE 2.6 04/17/2019    CREATININE 2.4 04/16/2019    CREATININE 2.3 04/15/2019     BNP:   Lab Results   Component Value Date    PROBNP 17,195 04/12/2019    PROBNP 6,014 02/04/2019    PROBNP 9,220 12/11/2017           ACTIVITY/FUNCTIONAL ASSESSMENT:     PT/OT:  Assessment   Assessment: Pt demo mobility at or close to his functional baseline and he plans to return home with assist as before. If home, recommend 24 hour assist initially, home PT safety eval, use of rolling walker (has). Treatment Diagnosis: moblity impairment due to PNA  Decision Making: Low Complexity  Patient Education: Pt educated on PT role, importance of OOB mobility, home dispo and he verbalized understanding. REQUIRES PT FOLLOW UP: Yes  Activity Tolerance  Activity Tolerance: Patient limited by endurance; Patient Tolerated treatment well       Patient Diagnosis(es): The primary encounter diagnosis was Pleural effusion on right. A diagnosis of Acute on chronic congestive heart failure, unspecified heart failure type (HCC) was also pertinent to this visit.      has a past medical history of Anemia, Anxiety, Atrial fibrillation ([address] CAD (coronary artery disease), CHF (congestive heart failure) ([address] Chronic renal insufficiency, Gout, History of BPH, Hyperlipidemia, Hypertension, and Hypothyroidism. has a past surgical history that includes Pacemaker insertion (2008); Coronary artery bypass graft (2001); shoulder surgery;  Colon surgery (2004); and Cardiac defibrillator placement.     Restrictions  Position Activity Restriction  Other position/activity restrictions: up with assist      Vision/Hearing  Vision: Impaired  Vision Exceptions: Wears glasses for reading  Hearing: Exceptions to [facility]  Hearing Exceptions: Bilateral hearing aid    Subjective  General  Chart Reviewed: Yes  Additional Pertinent Hx: 81 yo admitted 4/12/19 for hydropneumothorax. Pulmonology consult-diuresing pt; CXR/ Chest CT positive PNA. Pmhx: [facility] 3/5-3/6/19 for PNA, AICD, CABG, anxiety, afib, CAD, CHF, CRI, HTN. Family / Caregiver Present: No  Diagnosis: hydropneumothorax  Follows Commands: Within Functional Limits  Subjective  Subjective: Pt found supine in bed and agreeable to PT. \" I want them to walk me more.  \"  Pain Screening  Patient Currently in Pain: Denies        Orientation  Orientation  Overall Orientation Status: Within Functional Limits      Social/Functional History  Social/Functional History  Lives With: Spouse(Wife is blind and pt has to A her to get around home and facility)  Type of Home: Assisted living  Home Layout: One level  Home Access: Elevator, Level entry  Bathroom Shower/Tub: Walk-in shower, Shower chair with back  [employer]: Grab bars in shower, Grab bars around toilet, Shower chair  Bathroom Accessibility: Accessible  Home Equipment: 4 wheeled walker  Receives Help From: Family, Home health(Nurses from [facility] come in prn)  ADL Assistance: Independent  Homemaking Assistance: Needs assistance(Cleaning done by [facility], go to dining room for meals)  Homemaking Responsibilities: No  Ambulation Assistance: Independent(Rollator)  Transfer Assistance: Independent  Active : Yes(Only occassionally)  Occupation: Retired     Objective     AROM RLE (degrees)  RLE AROM: WNL  AROM LLE (degrees)  LLE AROM : WNL      Strength RLE  Strength RLE: WFL  Strength LLE  Strength LLE: WFL  Bed mobility  Supine to Sit: Modified independent  Scooting: Independent      Transfers  Sit to Stand: Contact guard assistance(x3 trials; occ vc for hand placement)  Stand to sit: Contact guard assistance(x3 trials; occ vc for hand placement/safety)      Ambulation  Device: Rolling Walker  Assistance: Contact guard assistance  Quality of Gait: small, quick steps with wide ESTEFANIA; no overt LOB noted; pt fatigued quickly  Distance: 60 ft, 20 ft  Comments: Pt reports that he feels like he is at his baseline for walking. Balance  Sitting - Static: Good  Sitting - Dynamic: Good  Standing - Static: Fair(with B UE support of rolling walker)  Standing - Dynamic: Fair(with BUE support of rolling walker)         Plan   Plan  Times per week: 2-5  Current Treatment Recommendations: Endurance Training, Transfer Training, Gait Training, Functional Mobility Training  Safety Devices  Type of devices: Nurse notified, Call light within reach, Chair alarm in place, Left in chair                         AM-PAC Score  AM-PAC Inpatient Mobility Raw Score : 20  AM-PAC Inpatient T-Scale Score : 47.67  Mobility Inpatient CMS 0-100% Score: 35.83  Mobility Inpatient CMS G-Code Modifier : CJ     Goals  Short term goals  Time Frame for Short term goals: discharge  Short term goal 1: sit to/from stand supervision  Short term goal 2: ambulate 100 ft with rolling walker supervision  Patient Goals   Patient goals : return to [facility]     Therapy Time    Individual Concurrent Group Co-treatment   Time In 0840      Time Out 0920      Minutes 40        Timed Code Treatment Minutes: 30       Total Treatment Minutes:  36     Gleen Meckel, PT    Ambulation by day 2: Yes  Cardiac Rehab Referral for Ph2: No  Time Spent Educating/Exercising:       NUTRITION:  Diet Orders / Intake / Nutrition Support   Current diet/supplement order: DIET CARDIAC; Low Sodium (2 GM)   Food/Nutrition-Related History   Pre-Admission / Home Diet: Pre-Admission/Home Diet: General    Home Supplements / Herbals:  none noted   Food Restrictions / Cultural Requests:    none noted   Allergies as of 04/12/2019 - Review Complete 04/12/2019   Allergen Reaction Noted    Lactose  03/05/2019      Pt does not currently follow a low sodium diet at home.    Nutrition Education     Type and Reason for Visit: Consult, Patient Education     Subjective:  Consult received for diet education (heart failure diet guidelines). Pt is not appropriate for diet education since pt lives in [address] with meals prepared for him. Will follow per standards of care.        Electronically signed by Donna Marinelli RD, GUILLERMINA on 19 at 4:01 PM     Contact Number: [phone]       Patient is from a SNF where meals are monitored and provided.     PHARMACY  During Admission  If EF <40% ACE ordered, or ARB or contraindication documented: Not applicable - LVEF 34-18% on 19  If EF <40% Evidence-Based Beta Blocker ordered or contraindication documented: Not applicable - LVEF 06-96% on 19  If EF 35% or less, K <5on admit, Cr<2 (female) <2.5 (male), Aldosterone antagonist ordered: Not applicable - LVEF 39-50% on 19  Upon Discharge  If EF <40% ACE ordered, or ARB or contraindication documented: Not applicable - LVEF 81-33% on 19  If EF <40% Evidence-Based Beta Blocker ordered or contraindication documented:  Toprol XL  If EF 35% or less, K <5on admit, Cr<2 (female) <2.5 (male), Aldosterone antagonist ordered: Not applicable - LVEF 35-68% on 19  Received Influenza Vaccine (Oct-Mar) No longer in flu season  DVT Prophylaxis by Day 2: Yes  Prescription drug coverage: Yes  Can afford prescription co-pay: Yes  Time Spent Educatin mins      NURSING  Patient Education:       Worsening HF symptoms Yes    fluid restriction  Yes    Tobacco cessation (Smoked in the last 12 months) No  daily weights and parameters  Yes  Avoid NSAIDS in HF Yes  written HF education given Yes     Follow up appointment Yes    ICD counseling No EF >40%  Patient can Teach Back:   Worsening HF symptoms and when to report Yes   Weight gain to report to healthcare provider Yes  Amount of sodium to eat per day Yes  Name of their Jose Locker Yes    Time Spent Educatin mins      Total Time Spent on Patient Education:  60minutesNo    CASE MANAGEMENT:  Assessment:      DISCHARGE

## 2019-04-18 PROBLEM — J90 PLEURAL EFFUSION ON RIGHT: Status: ACTIVE | Noted: 2019-04-12

## 2019-04-18 NOTE — PROGRESS NOTES
Pt ambulated in the wu, tolerated well. No dypsnea noted. SpO2 94-96%.   Hardy Bolus  4/18/2019  11:07 AM

## 2019-04-18 NOTE — CARE COORDINATION
ASIF spoke with ALEX at [facility] ([phone]). DON reported the initialy would want patient to have a skilled stay before returning. SW noted that patient does not qualify for skilled stay. DON asked about homecare, team was already putting that plan in place. [facility] works with [facility] on site. ASIF met with patient at bedside at 3:21 pm. Patient was agreeable to Home care and working with [facility]'s preferred provider. IMM letter completed. Copy giving to patient. SW contacted [facility] are they are able to accept.        Myranda Hercules, MSW, LSW     [facility]   [phone]

## 2019-04-18 NOTE — CARE COORDINATION
RN PORFIRIO Dee. placed call to [facility] [place] to [address] ([phone]) and is awaiting return call about return to facility. SW continues to follow.      MARCELO Ulloa, LSW     [facility]   [phone]

## 2019-04-18 NOTE — DISCHARGE SUMMARY
Hospital Medicine Discharge Summary    Patient ID: Elana Crowley   Gender: male  : 1925   Age: 80 y.o. MRN: [id number]  Code Status: Limited    Patient's PCP: Kobe Weaver MD    Admit Date: 2019     Discharge Date:   19    Admitting Physician: Marbin Valenzuela MD     Discharge Physician: No Stafford MD     Discharge Diagnoses: hydropneumothorax,  AoC HFpEF exacerbation       Active Hospital Problems    Diagnosis Date Noted    Hydropneumothorax [J94.8] 2019    BATEMAN (dyspnea on exertion) [R06.09] 2017    Pleural effusion transudative [J94.8]     Acute on chronic diastolic heart failure (HCC) [I50.33]        The patient was seen and examined on day of discharge and this discharge summary is in conjunction with any daily progress note from day of discharge. Hospital Course: Fozia Chilel is a 79 yo male with hx of CHF, CAD s/p CABG and defibrillator placement, HTN, HLD, hypothyroidism, Afib, CKD who presents with dyspnea on exertion. Patient had recent (3/2019) admission due to large right pleural effusion and he had thoracentesis that relieved dyspnea at that time. He was discharged next day and he was doing well without SOB for a week or two. This time he complains of worsening dyspnea for several weeks. He becomes easily short of breath with getting up and ambulating. He was found to have recurrence of his R pleural effusion. He was started on a lasix gtt @ 10 mg/hr with good results. He diuresed 4.5L during his admission. It was felt that diuresis would be safer than a thoracentesis, given that his last thoracentesis caused an iatrogenic pneumothorax. On day of discharge, he was feeling well. He ambulated the halls and did not desaturate. He did not complain of shortness of breath, chest pain or abdominal pain. He anticipated discharge. He had no questions. He will be discharged on lasix 80 mg bid, increased from 40 mg bid.     Disposition:  Skilled Facility    Physical Exam Performed:     /65   Pulse 103   Temp 97 °F (36.1 °C) (Axillary)   Resp 16   Ht 5' 6\" (1.676 m)   Wt 119 lb 1.6 oz (54 kg)   SpO2 94%   BMI 19.22 kg/m²       General appearance:  No apparent distress, appears stated age and cooperative. HEENT:  Normal cephalic, atraumatic without obvious deformity. Pupils equal, round, and reactive to light. Extra ocular muscles intact. Conjunctivae/corneas clear. Neck: Supple, with full range of motion. No jugular venous distention. Trachea midline. Respiratory:  Normal respiratory effort. Better aeration of RLL (improved)  Cardiovascular:  Regular rate and rhythm with normal S1/S2 without murmurs, rubs or gallops. Abdomen: Soft, non-tender, non-distended with normal bowel sounds. Musculoskeletal:  No clubbing, cyanosis or edema bilaterally. Full range of motion without deformity. Skin: Skin color, texture, turgor normal.  No rashes or lesions. Neurologic:  Neurovascularly intact without any focal sensory/motor deficits. Cranial nerves: II-XII intact, grossly non-focal.  Psychiatric:  Alert and oriented, thought content appropriate, normal insight  Capillary Refill: Brisk,< 3 seconds   Peripheral Pulses: +2 palpable, equal bilaterally       Labs: For convenience and continuity at follow-up the following most recent labs are provided:      CBC:    Lab Results   Component Value Date    WBC 5.6 04/18/2019    HGB 11.2 04/18/2019    HCT 33.8 04/18/2019     04/18/2019       Renal:    Lab Results   Component Value Date     04/18/2019    K 4.4 04/18/2019    K 4.0 04/12/2019    CL 92 04/18/2019    CO2 31 04/18/2019    BUN 61 04/18/2019    CREATININE 2.8 04/18/2019    CALCIUM 9.6 04/18/2019    PHOS 4.0 02/04/2019         Significant Diagnostic Studies    Radiology:   XR CHEST STANDARD (2 VW)   Final Result      Stable appearance of the right basilar pleural parenchymal disease may represent any combination of pleural effusion with atelectasis/infiltrate. Follow-up to document resolution. CT CHEST WO CONTRAST   Final Result      1. Consolidation right lobe increased. 2. Large right pleural effusion increased. 3. Small to moderate left pleural effusion. 4. Centrilobular emphysema. 5. No evidence of pneumothorax. XR CHEST PORTABLE   Final Result      1. Right hydropneumothorax with new fluid component compared to the chest x-ray from 3/6/2019. Critical results discussed with Dr. Kiara Whitlock by Dr. Zaida Garrido 39:21.   2. Consolidation of the right mid and lower lung zones new. Consults:     IP CONSULT TO PRIMARY CARE PROVIDER  IP CONSULT TO DIETITIAN  IP CONSULT TO HEART FAILURE NURSE/COORDINATOR  IP CONSULT TO DIETITIAN  IP CONSULT TO HOME CARE NEEDS    Disposition:  SNF     Condition at Discharge: Stable    Discharge Instructions/Follow-up:  Lasix 80 mg bid  CHF clinic 4/25, Dr Kaylee Parra in 2 wks    Code Status:  Limited     Activity: activity as tolerated    Diet: cardiac diet      Discharge Medications:     Current Discharge Medication List           Details   docusate sodium (COLACE, DULCOLAX) 100 MG CAPS Take 100 mg by mouth daily  Qty: 30 capsule, Refills: 1      polyethylene glycol (GLYCOLAX) packet Take 17 g by mouth daily as needed for Constipation  Qty: 527 g, Refills: 1              Details   furosemide (LASIX) 80 MG tablet Take 1 tablet by mouth 2 times daily  Qty: 60 tablet, Refills: 0              Details   escitalopram (LEXAPRO) 5 MG tablet TAKE 1 TABLET BY MOUTH DAILY TO HELP ANXIETY  Qty: 90 tablet, Refills: 0      fenofibrate (TRICOR) 54 MG tablet TAKE 1 TABLET BY MOUTH EVERY DAY  Qty: 90 tablet, Refills: 1    Comments: **Patient requests 90 days supply**      ALPRAZolam (XANAX) 0.25 MG tablet Take 1 tablet by mouth daily as needed for Sleep for up to 60 days.   Qty: 30 tablet, Refills: 1    Associated Diagnoses: Anxiety      ASPIRIN LOW DOSE 81 MG EC tablet TAKE 1 TABLET BY MOUTH DAILY  Qty: 90 tablet, Refills: 1 vitamin D (CHOLECALCIFEROL) 1000 UNIT TABS tablet Take 1,000 Units by mouth daily      clopidogrel (PLAVIX) 75 MG tablet TAKE 1 TABLET BY MOUTH DAILY  Qty: 90 tablet, Refills: 3      allopurinol (ZYLOPRIM) 100 MG tablet TAKE 1 TABLET BY MOUTH DAILY  Qty: 90 tablet, Refills: 2      Multiple Vitamins-Minerals (CENTRUM SILVER) TABS TAKE 1 TABLET BY MOUTH DAILY  Qty: 90 tablet, Refills: 2      levothyroxine (SYNTHROID) 100 MCG tablet TAKE 1 TABLET BY MOUTH DAILY  Qty: 90 tablet, Refills: 2      atorvastatin (LIPITOR) 40 MG tablet TAKE 1 TABLET BY MOUTH EVERY MORNING  Qty: 90 tablet, Refills: 2      metoprolol succinate (TOPROL XL) 100 MG extended release tablet Take 1 tablet by mouth daily  Qty: 90 tablet, Refills: 3      diltiazem (CARDIZEM CD) 120 MG extended release capsule TAKE 1 CAP BY MOUTH DAILY (DX: _________)  Jean Rakes: 30 capsule, Refills: 10      fluticasone (FLONASE) 50 MCG/ACT nasal spray USE 2SPRAYS BY NASAL ROUTE DAILY  Qty: 3 Bottle, Refills: 3    Comments: **Patient requests 90 days supply**      famotidine (PEPCID) 20 MG tablet TAKE 1 TABLET BY MOUTH DAILY AS NEEDED FOR REFLUX  Qty: 90 tablet, Refills: 3    Comments: **Patient requests 90 days supply**      Acetaminophen (APAP) 325 MG TABS Take 650 mg by mouth every 6 hours as needed for Pain or Fever  Qty: 100 tablet, Refills: 3      Melatonin 5 MG CAPS TAKE 1 CAPSULE BY MOUTH NIGHTLY AS NEEDED FOR SLEEP  Qty: 90 capsule, Refills: 3             Time Spent on discharge is more than 30 minutes in the examination, evaluation, counseling and review of medications and discharge plan. Signed:    Josephine Izaguirre MD   4/18/2019      Thank you Magdalene Churchill MD for the opportunity to be involved in this patient's care. Addendum to Resident H& P/Progress note:  I have personally seen,examined and evaluated the patient.  I have reviewed the current history, physical findings, labs and assessment and plan and agree with note as documented by resident MD ( Abner Santamaria MD, Salvador Gruber

## 2019-04-18 NOTE — PROGRESS NOTES
Pulmonary Followup Note    Indication for visit: Pleural effusion, dyspnea on exertion    Subjective: Patient had a net diuresis of 500 mL in last 24 hrs on Lasix 10 mg/hr. Weight today is 119 pounds, down 1 pounds from yesterday and 3 pounds two days ago. He walked to the nurse's station this morning on RA with an O2 sat of 96% and no BATEMAN, tachypnea or labored breathing.  docusate sodium  100 mg Oral Daily    heparin (porcine)  5,000 Units Subcutaneous BID    allopurinol  100 mg Oral Daily    aspirin  81 mg Oral Daily    atorvastatin  40 mg Oral QAM    diltiazem  120 mg Oral Daily    escitalopram  5 mg Oral Daily    fenofibrate  54 mg Oral Daily    fluticasone  2 spray Each Nare Daily    levothyroxine  100 mcg Oral Daily    metoprolol succinate  100 mg Oral Daily    therapeutic multivitamin-minerals  1 tablet Oral Daily    vitamin D  1,000 Units Oral Daily    sodium chloride flush  10 mL Intravenous 2 times per day    famotidine  20 mg Oral Daily     ROS: No fever or headache    PHYSICAL EXAMINATION:  /65   Pulse 103   Temp 97 °F (36.1 °C) (Axillary)   Resp 16   Ht 5' 6\" (1.676 m)   Wt 119 lb 1.6 oz (54 kg)   SpO2 94%   BMI 19.22 kg/m²   T-max 98.7   on room air  Gen: NAD. Speaking in full sentences with out using accessory resp muscles  HEENT: PERRL, EOMI, OP nl  Lung: CTA with some better aeration of right lung base  CV: RRR without M/R/R  Abd: +BS, soft, NT/ND  Ext: No edema.     DATA  CBC:   Recent Labs     19  0520   WBC 5.8 5.5 5.6   HGB 10.2* 10.4* 11.2*   HCT 30.5* 31.4* 33.8*   MCV 93.4 91.9 92.4    222 224     BMP:   Recent Labs     19  0532    137 136   K 4.3 4.0 4.4   CL 97* 92* 92*   CO2 30 32 31   BUN 51* 57* 61*   CREATININE 2.4* 2.6* 2.8*     VB.46/43     Radiology Review:  All pertinent images / reports were reviewed as a part of this lower thoracic spine at level of T9-10 and T10-11. Curvature thoracic spine convex to right. [place] screening right greater tuberosity.           Impression       1. Consolidation right lobe increased. 2. Large right pleural effusion increased. 3. Small to moderate left pleural effusion. 4. Centrilobular emphysema. 5. No evidence of pneumothorax.      Echo:  Summary   Left ventricular cavity size is normal. There is mild-moderate concentric   left ventricular hypertrophy. Left ventricular function is mildly reduced   with ejection fraction estimated at 45-50%. Septal and apical wall   hypokinesis is present. Indeterminate diastolic function due to afib.   Mild to moderate mitral regurgitation is present.   Moderate tricuspid regurgitation.  IVC size is normal (<2.1cm) and collapses <50% with respiration consistent   with mildly elevated RA pressure (8mmHg). Estimated pulmonary artery   systolic pressure is at 47 mmHg assuming a right atrial pressure of 8 mmHg.     Assessment       1. Moderate right transudative pleural effusion   2. HFpEF  3. BATEMAN  4. CKD with mild worsening     Plan     1. Stop IV lasix  2. Ok to D/C home  3. Would increase home lasix to 80 mg PO BID  4.  Follow up with me within 2 weeks or sooner if needed    Marc Hanosn

## 2019-04-18 NOTE — CARE COORDINATION
2019  [address] [place] [address]  Clinical Case Management Department    Discharge Summary    Patient: Teodora Caba  MRN: [id number] / : 1925    Emergency Contacts  Healthcare Agent Appointed: Ran [address] Agent's Name: Patricio Ware)  Healthcare Agent's Phone Number: [phone]    Admission Documentation  Attending Provider: Carlos Solis MD  Admit date/time: 2019  9:18 AM  Status: Inpatient [101]  Diagnosis: Hydropneumothorax     Readmission within last 30 days:  no     Living Situation:  Discharge Planning  Living Arrangements: Spouse/Significant Other  Support Systems: Spouse/Significant Other, Family Members  Potential Assistance Needed: Skilled Nursing Facility  Type of Home Care Services: [address]  Patient expects to be discharged to[de-identified] Home  Expected Discharge Date: 19    Written Discharge Summary:  Patient to discharge home to [facility] on this date (Assisted Living). Patient was set up with [address] ([phone]) for home care needs at discharge. Patient was agreeable to plan. Daughter to provide transport. RN to call report to AL. Service Assessment :       Values / Beliefs  Do you have any ethnic, cultural, sacramental, or spiritual Catholic needs you would like us to be aware of while you are in the hospital?: No    Advance Directives (For Healthcare)  Pre-existing DNR Comfort Care/DNR Arrest/DNI Order: No  Healthcare Directive: Yes, patient has an advance directive for healthcare treatment  Healthcare Agent Appointed: Ran [address] Agent's Name: Patricio Ware)  Healthcare Agent's Phone Number: [phone]  If you are unable to speak for yourself, does your Healthcare Agent or Legal Spokesperson know your healthcare wishes?: Yes      Pharmacy: No concerns noted.     Potential Assistance Purchasing Medications:  No  Does patient want to participate in local refill/meds to beds program?: No    Notification for [place] placement completed in [facility]/PAS?: No

## 2019-04-18 NOTE — PLAN OF CARE
Problem: Falls - Risk of:  Goal: Will remain free from falls  Description  Will remain free from falls  Outcome: Ongoing  Note:   No falls thus far on this shift. Bed locked and in low position. Call light within reach. Pt encouraged to call out to voice any needs. Bedside table in reach. Goal: Absence of physical injury  Description  Absence of physical injury  Outcome: Ongoing     Problem: Fluid Volume:  Goal: Hemodynamic stability will improve  Description  Hemodynamic stability will improve  Outcome: Ongoing  Goal: Ability to maintain a balanced intake and output will improve  Description  Ability to maintain a balanced intake and output will improve  Outcome: Ongoing  Note:   Patient currently on continuous lasix infusion with adequate output at this time. Goal: Signs and symptoms of dehydration will decrease  Description  Signs and symptoms of dehydration will decrease  Outcome: Ongoing  Goal: Ability to achieve a balanced intake and output will improve  Description  Ability to achieve a balanced intake and output will improve  Outcome: Ongoing  Goal: Diagnostic test results will improve  Description  Diagnostic test results will improve  Outcome: Ongoing     Problem: Health Behavior:  Goal: Identification of resources available to assist in meeting health care needs will improve  Description  Identification of resources available to assist in meeting health care needs will improve  Outcome: Ongoing     Problem: Respiratory:  Goal: Respiratory status will improve  Description  Respiratory status will improve  Outcome: Ongoing  Note:   Respiratory status is stable.   Patient SpO2 96%+ on room air     Problem: Breathing Pattern - Ineffective:  Goal: Ability to achieve and maintain a regular respiratory rate will improve  Description  Ability to achieve and maintain a regular respiratory rate will improve  Outcome: Ongoing

## 2019-04-18 NOTE — PROGRESS NOTES
Report called to Harley Chaidez at [address] [place] [address]. Daughter Diane Ruiz transporting patient [facility] to [facility].

## 2019-04-19 NOTE — CARE COORDINATION
Nirm[facility] Transitions Initial Follow Up Call    Call within 2 business days of discharge: Yes    Patient: Wilma Rosario  Patient : 1925   MRN: [id number]   Reason for Admission: CHF, Hydropneumothorax   Discharge Date: 19 RARS: Readmission Risk Score: 25      Last Discharge [facility]       Complaint Diagnosis Description Type Department Provider    19 Shortness of Breath Pleural effusion on right . .. ED to Hosp-Admission (Discharged) (ADMITTED)  Em 4 U Cinthia Manning MD; Clem Maciel MD           Spoke with: [address]: [facility] [employer]   Non-face-to-face services provided:  Obtained and reviewed discharge summary and/or continuity of care documents  Education of patient/family/caregiver/guardian to support self-management-.   Assessment and support for treatment adherence and medication management-.    Care Transitions 24 Hour Call    Schedule Follow Up Appointment with PCP:  Declined  Do you have any ongoing symptoms?:  Yes  Patient-reported symptoms:  Shortness of Breath  Do you have a copy of your discharge instructions?:  Yes  Do you have all of your prescriptions and are they filled?:  Yes (Comment: The SN at [facility] manages his medications and he states they have been giving him newly scheduled meds since DC home yesterday. )  Have you been contacted by a [facility] Pharmacist?:  No  Have you scheduled your follow up appointment?:  Yes  How are you going to get to your appointment?:  Other (Comment: Facility )  Were you discharged with any Home Care or Post Acute Services:  Yes  Post Acute Services:  Home Health (Comment: [facility] )  Patient DME:  Brice walker, Straight cane, Shower chair, Other  Other Patient DME:  grab bars, alert button, [address]  Do you have support at home?:  Partner/Spouse/SO  Do you feel like you have everything you need to keep you well at home?:  Yes  Are you an active caregiver in your home?:  Yes  Care Transitions Interventions  No Identified Needs       Summary  CTC spoke with patient this afternoon for follow up 24 hour discharge CTC call. Patient states he is doing well, denies any complaints of nausea, vomiting, fevers, chills, LE Edema or Cough. Patient does report some SOB that improves with rest, states he was seen this am by PT and SN with [facility] agency. Patient states he was weighed this am at 119 lbs. CTC encouraged patient to continue to monitor weight, reporting a 3 lb weight gain overnight, or a 5 lb weight gain in a week. CTC also instructed patient to drink plenty of fluids, and eat lot's of fruits and vegetables. Patient states he is passing gas, but BM's have been small and hard. Patient encouraged to drink prune juice as well, was prescribed Colace and Glycolax on discharge. CTC attempted to speak with RN at [address] to review [id number]. Unable to leave  due to mailbox being full on RN phone. Patient has all follow up appointments scheduled at this time. CTC advised Pt of use of urgent care or physicians 24 hr access line if assistance is needed after hours or CTC weekend. CTC provided education on s/s that require medical attention and when to seek medical attention.      Follow Up  Future Appointments   Date Time Provider Gwen Perez   4/25/2019  3:30 PM CHANO Coombs - CNP [place] [facility]   5/16/2019 10:00 AM Taylor Salguero MD [facility]   6/4/2019 12:45 PM Rina Heredia MD [facility]   7/9/2019  3:45 AM SCHEDULE, [facility] PHONE TRANSMISSION [place] [facility] TACO Langford RN

## 2019-04-24 NOTE — PROGRESS NOTES
[address]   Congestive Heart Failure    Primary Care Doctor:  Anna Loza MD  Primary Cardiologist: Jose Friedman Pt: Yes    Chief Complaint:  fatigue    History of Present Illness:  Leeann Srivastava is a 80 y.o. male with PMH CAD, CABG, HTN, HFmrEF, HLD, AF, CKD who presents today for hospital f/u. Leeann Srivastava was admitted 4/12/19 and discharged 4/18/19 to [facility] course:  Patient had recent (3/2019) admission due to large right pleural effusion and he had thoracentesis that relieved dyspnea at that time. He was discharged next day and he was doing well without SOB for a week or two. This time he complains of worsening dyspnea for several weeks. He becomes easily short of breath with getting up and ambulating. He was found to have recurrence of his R pleural effusion. He was started on a lasix gtt @ 10 mg/hr with good results. He diuresed 4.5L during his admission. It was felt that diuresis would be safer than a thoracentesis, given that his last thoracentesis caused an iatrogenic pneumothorax. Hospital D/C med changes include lasix increased from 40 to 80 bid    Since hospitalization he reports dyspnea, fatigue and denies chest pain, palpitations, orthopnea, PND, exertional chest pressure/discomfort, early saiety, edema, syncope. Approximate hosp diuresis was 4.5L, hospital weight on d/c was 119lb and discharge home weight was 119lb. Daily wts since that time have increased. Today's home wt: 124    Baseline Weight: 119lb    Admit BNP: 17,195       EF: 45-50%  Cardiac Imaging: Echo 4/12/19   Left ventricular cavity size is normal. There is mild-moderate concentric   left ventricular hypertrophy. Left ventricular function is mildly reduced   with ejection fraction estimated at 45-50%. Septal and apical wall   hypokinesis is present. Indeterminate diastolic function due to afib.   Mild to moderate mitral regurgitation is present.   Moderate tricuspid regurgitation.   [address] size is normal (<2.1cm) and collapses <50% with respiration consistent   with mildly elevated RA pressure (8mmHg). Estimated pulmonary artery   systolic pressure is at 47 mmHg assuming a right atrial pressure of 8 mmHg. Device: Yes      Activity: below baseline, in therapy  Can you walk 1-2 blocks or do a moderate amount of house/yard work? No  Cardiac Rehab Referral: No     NYHA Class: II       Sodium Restrictions: No Added Salt  Fluid Restrictions: 48-64 oz/day  Sodium and fluid restriction compliance: fair, eats in [place]ia    Pt Education: The patient has received education on the following topics: dietary sodium restriction, heart failure medications, the importance of physical activity, symptom management and weight monitoring        Past Medical History:   has a past medical history of Anemia, Anxiety, Atrial fibrillation ([facility] [place] [address] CAD (coronary artery disease), CHF (congestive heart failure) ([facility] [address] Chronic renal insufficiency, Gout, History of BPH, Hyperlipidemia, Hypertension, and Hypothyroidism. Surgical History:   has a past surgical history that includes Pacemaker insertion (2008); Coronary artery bypass graft (2001); shoulder surgery; Colon surgery (2004); and Cardiac defibrillator placement. Social History:   reports that he has quit smoking. He has never used smokeless tobacco. He reports that he drinks about 3.6 oz of alcohol per week. He reports that he does not use drugs. Family History:   Family History   Problem Relation Age of Onset    Dementia Mother     Emphysema Father     Other Father         heart ?  Other Brother         leukemia&, AICD       Home Medications:  Prior to Admission medications    Medication Sig Start Date End Date Taking?  Authorizing Provider   docusate sodium (COLACE, DULCOLAX) 100 MG CAPS Take 100 mg by mouth daily 4/19/19   Laura Irwin MD   polyethylene glycol [facility]) packet Take 17 g by mouth daily as needed for Constipation 4/18/19 5/18/19  Jorge Luis Scherer Cardiovascular: Negative. Gastrointestinal: Negative. Genitourinary: Negative. Musculoskeletal: Negative. Skin: Negative. Neurological: Positive for dizziness. Hematological: Negative. Psychiatric/Behavioral: Negative. Physical Examination:    Vitals:    04/25/19 1528 04/25/19 1534   BP: (!) 160/50 (!) 160/60   Pulse: 64    Weight: 124 lb 3.2 oz (56.3 kg)            Physical Exam   Constitutional: He is oriented to person, place, and time. He appears well-developed and well-nourished. HENT:   Head: Normocephalic and atraumatic. Eyes: Pupils are equal, round, and reactive to light. EOM are normal.   Neck: Normal range of motion. Neck supple. Cardiovascular: Normal rate, regular rhythm, normal heart sounds and intact distal pulses. Pulmonary/Chest: Effort normal and breath sounds normal.   Abdominal: Soft. Musculoskeletal: Normal range of motion. Neurological: He is alert and oriented to person, place, and time. Skin: Skin is warm and dry. Psychiatric: He has a normal mood and affect.  His behavior is normal. Judgment and thought content normal.       Lab Data:    CBC:   Lab Results   Component Value Date    WBC 5.6 04/18/2019    WBC 5.5 04/17/2019    WBC 5.8 04/16/2019    RBC 3.66 04/18/2019    RBC 3.41 04/17/2019    RBC 3.26 04/16/2019    RBC 3.39 03/20/2017    RBC 3.00 09/19/2016    RBC 3.04 02/26/2016    HGB 11.2 04/18/2019    HGB 10.4 04/17/2019    HGB 10.2 04/16/2019    HCT 33.8 04/18/2019    HCT 31.4 04/17/2019    HCT 30.5 04/16/2019    MCV 92.4 04/18/2019    MCV 91.9 04/17/2019    MCV 93.4 04/16/2019    RDW 17.4 04/18/2019    RDW 17.5 04/17/2019    RDW 18.0 04/16/2019     04/18/2019     04/17/2019     04/16/2019     BMP:  Lab Results   Component Value Date     04/18/2019     04/17/2019     04/16/2019    K 4.4 04/18/2019    K 4.0 04/17/2019    K 4.3 04/16/2019    K 4.0 04/12/2019    K 5.8 04/12/2019    K 3.7 03/06/2019    CL 92 carbohydrates and sodium content of food:   CalorieKing  MyFitnessPal      I appreciate the opportunity of cooperating in the care of this individual.    JORGE CALVILLO, PANCHO, 4/24/2019, 11:50 AM    QUALITY MEASURES  1. Tobacco Cessation Counseling: NA  2. Retake of BP if >140/90:   Yes  3. Documentation to PCP/referring for new patient:  Sent to PCP at close of office visit  4. CAD patient on anti-platelet: Yes  5. CAD patient on STATIN therapy:  Yes  6. Patient with CHF and aFib on anticoagulation:  No   7. Patient Education:  Yes   8. BB for LVSD :  Yes   9. ACE/ARB for LVSD:  No, CKD   10.  Left Ventricular Ejection Fraction (LVEF) Assessment:  Yes

## 2019-05-02 NOTE — CARE COORDINATION
Interventions:  Post Hospitalization Appt made         Goals Addressed                 This Visit's Progress     Conditions and Symptoms   Improving     I will schedule office visits, as directed by my provider. I will follow my Zone Management tool to seek urgent or emergent care. I will notify my provider of any symptoms that indicate a worsening of my condition. Barriers: lack of education  Plan for overcoming my barriers: Provide pt education and resources. Confidence: 6/10  Anticipated Goal Completion Date: 08/06/19 5/01/19: Recent hospitalization       Reduce Falls    Improving     I will reduce my risk of falls by the following: Use walking aids like cane or walker    Barriers: impairment:  Easily fatigued  Plan for overcoming my barriers: Use cane or walker when walking londer distances  Confidence: 8/10  Anticipated Goal Completion Date: 03/07/19 Goal Date Extended: 8/06/19 5/1/19: Recent hospitalization, gets a little dizzy if he gets up too quickly but he has been careful about getting up. Prior to Admission medications    Medication Sig Start Date End Date Taking? Authorizing Provider   docusate sodium (COLACE, DULCOLAX) 100 MG CAPS Take 100 mg by mouth daily 4/19/19   No Stafford MD   polyethylene glycol [facility]) packet Take 17 g by mouth daily as needed for Constipation 4/18/19 5/18/19  No Stafford MD   furosemide (LASIX) 80 MG tablet Take 1 tablet by mouth 2 times daily 4/18/19   No Stafford MD   escitalopram (LEXAPRO) 5 MG tablet TAKE 1 TABLET BY MOUTH DAILY TO HELP ANXIETY 4/11/19   Kobe Weaver MD   fenofibrate (TRICOR) 54 MG tablet TAKE 1 TABLET BY MOUTH EVERY DAY 4/2/19   Kobe Weaver MD   ALPRAZolam Elfida Scarlet) 0.25 MG tablet Take 1 tablet by mouth daily as needed for Sleep for up to 60 days.  3/20/19 5/19/19  Omar Martinez MD   ASPIRIN LOW DOSE 81 MG EC tablet TAKE 1 TABLET BY MOUTH DAILY 3/8/19   Kobe Weaver MD   vitamin D (CHOLECALCIFEROL) 1000 UNIT TABS tablet Take 1,000 Units by mouth daily    Historical Provider, MD   clopidogrel (PLAVIX) 75 MG tablet TAKE 1 TABLET BY MOUTH DAILY 1/24/19   Marianne Solitario MD   allopurinol (ZYLOPRIM) 100 MG tablet TAKE 1 TABLET BY MOUTH DAILY 12/24/18   Marianne Solitario MD   Multiple Vitamins-Minerals (CENTRUM SILVER) TABS TAKE 1 TABLET BY MOUTH DAILY 12/24/18   Marianne Solitario MD   levothyroxine (SYNTHROID) 100 MCG tablet TAKE 1 TABLET BY MOUTH DAILY 12/3/18   Marianne Solitario MD   atorvastatin (LIPITOR) 40 MG tablet TAKE 1 TABLET BY MOUTH EVERY MORNING 10/1/18   Marianne Solitario MD   metoprolol succinate (TOPROL XL) 100 MG extended release tablet Take 1 tablet by mouth daily 9/5/18   CHANO Avalos CNP   diltiazem (CARDIZEM CD) 120 MG extended release capsule TAKE 1 CAP BY MOUTH DAILY (DX: _________) 2/9/18   Marianne Solitario MD   famotidine (PEPCID) 20 MG tablet TAKE 1 TABLET BY MOUTH DAILY AS NEEDED FOR REFLUX 12/12/17   Marianne Solitario MD   Acetaminophen (APAP) 325 MG TABS Take 650 mg by mouth every 6 hours as needed for Pain or Fever 11/8/17   Marianne Solitario MD   Melatonin 5 MG CAPS TAKE 1 CAPSULE BY MOUTH NIGHTLY AS NEEDED FOR SLEEP 11/3/17   Marianne Solitario MD   fluticasone [facility]) 50 MCG/ACT nasal spray USE 2SPRAYS BY NASAL ROUTE DAILY 11/3/17   Marianne Solitario MD       Future Appointments   Date Time Provider Gwen Perez   5/16/2019 10:00 AM Marianne Solitraio MD [facility]   6/4/2019 12:45 PM Seth Chun MD [facility]   6/27/2019 10:00 AM Awilda Curling, MD [facility]   7/9/2019  3:45 AM [place]EDULE, [place]     ,   Congestive Heart Failure Assessment    Are you currently restricting fluids?:  No Restriction  Do you understand a low sodium diet?:  Yes  Do you understand how to read food labels?:  Yes  How many restaurant meals do you eat per week?:  0  Do you salt your food before tasting it?:  No     No patient-reported symptoms      Symptoms:   CHF associated dyspnea on exertion: Pos      Symptom course: stable  Weight trend:  stable  Salt intake watch compared to last visit:  stable      and   General Assessment    Do you have any symptoms that are causing concern?:  Yes  Progression since Onset:  Gradually Improving  Reported Symptoms:  Weakness (Comment: Gets dizzy if he gets up too quickly)

## 2019-05-02 NOTE — TELEPHONE ENCOUNTER
[facility] RN called to report BP and HR readings on patient:  4/30 90/50 sitting an dstanding, 5/2/19 96/64 sitting , standing 98/56   Weight today 122.6 , 4/30 123.6  C/o dizziness when standing up  Drinks at least 3-4  Glasses of water daily  Please call [facility] at [phone]

## 2019-05-03 NOTE — TELEPHONE ENCOUNTER
Ok to phone in     Orders Placed This Encounter   Medications    ALPRAZolam (XANAX) 0.25 MG tablet     Sig: Take 1 tablet by mouth daily as needed for Sleep for up to 60 days. Dispense:  30 tablet     Refill:  1         Controlled Substances Monitoring:     RX Monitoring 5/3/2019   Attestation The Prescription Monitoring Report for this patient was reviewed today. Acute Pain Prescriptions Not required given exclusionary diagnoses. ..    Chronic Pain Routine Monitoring -   Chronic Pain > 80 MEDD -

## 2019-05-05 PROBLEM — R06.09 DYSPNEA ON EXERTION: Status: ACTIVE | Noted: 2019-01-01

## 2019-05-05 NOTE — PROGRESS NOTES
Pt admitted to unit from ED. Vss. Oriented to room and call light. High fall risk. Bed alarm in place, encouraged patient to not get up with out assistance. Call light given to patient, white board updated. Dr. Sasha Mondragon and Dr. Janina Palm in room to see patient. Will continue to monitor.

## 2019-05-05 NOTE — H&P
History and Physical  PGY1    Patient's PCP: Tomy Nix MD    CC: SOB, dizziness    HISTORY OF PRESENT ILLNESS:    Patient is a 80 y.o. male with a history of CHF (last echo showed EF of 45% with septal and apical wall hypokinesis), CAD s/p CABG and defibrillator placement, HTN, HLD, hypothyroidis, afib on diltiazem, CKD presenting here with BATEMAN. Pt also endorses for the last 2 weeks he has had dizziness with transitioning from sitting to standing. Per pt, he has felt over the last few weeks worsening dyspnea on exertion (will get short of breath just talking or trying to get out of a chair). Occasionally falls, however has not recently hit his head. Eats well, however pt endorses that he has been urinating quite frequently. No fevers, no chills, no dysuria. No melena observed in his stool. Feels his SOB improves with lying flat and worsens with movement. No orthopnea. No PND. Does not use oxygen at home. No nausea, +intermittent episodes of emesis, however not recently. No diarrhea, +constipation. No leg swelling. Had prior admission on 4/12 with similar presentation, found to have recurrence of pleural effusion, received thoracentesis and lasix with improvement of sx. Baseline weight is 119 lbs. In ED: was noted that pt desaturated to 80's with 15 ft of walking. Labs: ProBNP 19K, Troponins 0.03-->0.04-->0. 04. CBC showed Hgb 9.2, down from 11.2 three weeks ago. Cr 2.3, BUN in 80's. Past Medical / Surgical History:    Past Medical History:   Diagnosis Date    Anemia     Anxiety     Atrial fibrillation ([address]     CAD (coronary artery disease)     Cardiologist  [facility]    CHF (congestive heart failure) (HCC)     Chronic renal insufficiency     Gout     History of BPH     Hyperlipidemia     Hypertension     Hypothyroidism     hypothyroid     Past Surgical History:   Procedure Laterality Date    CARDIAC DEFIBRILLATOR PLACEMENT      left upper subclavian replaced about 3 yrs ago per Dr. Rayray Galicia  2004    resection/ stents    [address]  2008    AICD replaced    SHOULDER SURGERY      cuff surgery       Medications Prior to Admission:    No current facility-administered medications on file prior to encounter. Current Outpatient Medications on File Prior to Encounter   Medication Sig Dispense Refill    ALPRAZolam (XANAX) 0.25 MG tablet Take 1 tablet by mouth daily as needed for Sleep for up to 60 days.  30 tablet 1    furosemide (LASIX) 80 MG tablet Take 0.5 tablets by mouth 2 times daily 60 tablet 0    docusate sodium (COLACE, DULCOLAX) 100 MG CAPS Take 100 mg by mouth daily 30 capsule 1    polyethylene glycol (GLYCOLAX) packet Take 17 g by mouth daily as needed for Constipation 527 g 1    escitalopram (LEXAPRO) 5 MG tablet TAKE 1 TABLET BY MOUTH DAILY TO HELP ANXIETY 90 tablet 0    fenofibrate (TRICOR) 54 MG tablet TAKE 1 TABLET BY MOUTH EVERY DAY 90 tablet 1    ASPIRIN LOW DOSE 81 MG EC tablet TAKE 1 TABLET BY MOUTH DAILY 90 tablet 1    vitamin D (CHOLECALCIFEROL) 1000 UNIT TABS tablet Take 1,000 Units by mouth daily      clopidogrel (PLAVIX) 75 MG tablet TAKE 1 TABLET BY MOUTH DAILY 90 tablet 3    allopurinol (ZYLOPRIM) 100 MG tablet TAKE 1 TABLET BY MOUTH DAILY 90 tablet 2    Multiple Vitamins-Minerals (CENTRUM SILVER) TABS TAKE 1 TABLET BY MOUTH DAILY 90 tablet 2    levothyroxine (SYNTHROID) 100 MCG tablet TAKE 1 TABLET BY MOUTH DAILY 90 tablet 2    atorvastatin (LIPITOR) 40 MG tablet TAKE 1 TABLET BY MOUTH EVERY MORNING 90 tablet 2    metoprolol succinate (TOPROL XL) 100 MG extended release tablet Take 1 tablet by mouth daily 90 tablet 3    diltiazem (CARDIZEM CD) 120 MG extended release capsule TAKE 1 CAP BY MOUTH DAILY (DX: _________) 30 capsule 10    famotidine (PEPCID) 20 MG tablet TAKE 1 TABLET BY MOUTH DAILY AS NEEDED FOR REFLUX 90 tablet 3    Acetaminophen (APAP) 325 MG TABS Take 650 mg by mouth every 6 hours as needed for Pain or Fever 100 tablet 3    Melatonin 5 MG CAPS TAKE 1 CAPSULE BY MOUTH NIGHTLY AS NEEDED FOR SLEEP 90 capsule 3    fluticasone (FLONASE) 50 MCG/ACT nasal spray USE 2SPRAYS BY NASAL ROUTE DAILY 3 Bottle 3       Allergies:  Lactose    Social History:   TOBACCO:   reports that he has quit smoking. He has never used smokeless tobacco.     ETOH:   reports that he drinks about 3.6 oz of alcohol per week. (says that he drinks a shot of bourbon per day)    Patient currently lives in an assisted living environment  Used to be   Has three adult children  Is      Family History:       Problem Relation Age of Onset    Dementia Mother     Emphysema Father     Other Father         heart ?  Other Brother         leukemia&, AICD     ROS: Review of Systems -   As per HPI   All other systems reviewed and are negative. PHYSICAL EXAM:  BP (!) 149/57   Pulse 108   Temp 97.6 °F (36.4 °C)   Resp 25   Ht 5' 6\" (1.676 m)   Wt 124 lb (56.2 kg)   SpO2 99%   BMI 20.01 kg/m²   No results for input(s): POCGLU in the last 72 hours. Physical Exam   Constitutional: He is oriented to person, place, and time. He appears well-developed. No distress. Thin, dehydrated appearing male. HENT:   Head: Normocephalic and atraumatic. Mouth/Throat: Mucous membranes are dry. Eyes: Pupils are equal, round, and reactive to light. EOM are normal.   Neck: Neck supple. No JVD present. Cardiovascular: Normal rate, regular rhythm and normal heart sounds. Pulmonary/Chest: Effort normal. He has rales. Abdominal: Soft. Bowel sounds are normal. He exhibits no distension. There is no tenderness. There is no guarding. Musculoskeletal: He exhibits no edema. Neurological: He is alert and oriented to person, place, and time. Skin: Skin is warm and dry. Decreased skin turgor   Psychiatric: He has a normal mood and affect.  His behavior is normal.         LABS:  Recent Labs     05/05/19  0731   WBC 5.4   HGB 9.2*   HCT 28.4*                                                                     Recent Labs     05/05/19  0731      K 3.7   CL 99   CO2 26   BUN 87*   CREATININE 2.3*   GLUCOSE 114*     Recent Labs     05/05/19  0731   AST 19   ALT 8*   BILITOT 0.4   ALKPHOS 45     Recent Labs     05/05/19  0731 05/05/19  0926   TROPONINI 0.03* 0.04*     No results for input(s): BNP in the last 72 hours. No results found for: PHART, DEM4ZYP, PO2ART  Recent Labs     05/05/19  0731   INR 1.12     No results for input(s): NITRITE, COLORU, PHUR, LABCAST, WBCUA, RBCUA, MUCUS, TRICHOMONAS, YEAST, BACTERIA, CLARITYU, SPECGRAV, LEUKOCYTESUR, UROBILINOGEN, BILIRUBINUR, BLOODU, GLUCOSEU, AMORPHOUS in the last 72 hours. Invalid input(s): KETONESU     IMAGING:  XR CHEST STANDARD (2 VW)   Final Result   Impression:   1. Right pleural effusion and right lung consolidation decreased from 4/15/2019 chest x-ray. Assessment & Plan:    Marylee Somerset is a 80 y.o. male PMH CHF (last echo showed EF of 45% with septal and apical wall hypokinesis), CAD s/p CABG and defibrillator placement, HTN, HLD, hypothyroidis, afib on diltiazem, CKD presenting here with BATEMAN. Dizziness, BATEMAN 2/2 Overdiuresis  Pt has hx of CHF and previously was admitted for acute on chronic heart failure with pleural effusion. Was discharged with 80 BID lasix after thoracentesis on last admission with improvement of his SOB. Now however is having more of a dehydration picture with lightheadedness, dizziness, and severe BATEMAN. Most likely has been overdiuresed. I don't think at this time his dizziness, lightheadedness has any neurologic or cardiac etiology. -orthostatic vitals  -IVF 75 ml/hr for total of 500 ml  -start lasix 40 BID tomorrow  -daily weights  -I/O's  -telemetry     Anemia 2/2 ?acute blood loss vs FRANTZ  While pt does not endorse melena, BUN to Cr is elevated, and pt has had 2 gram Hgb drop in 3 weeks. -iron studies pending   -daily CBC's  -POCT occult stool     Chronic Medical Problems:    Gout   -allopurinol     CAD s/p CABG  -plavix    -asa    Afib  -home diltiazem    HFrEF  -metoprolol with holding parameters    Hypothyroidism  -levothyroxine 100 mcg    HLD  -fenofibrate  -lipitor    Constipation  -colace  -miralax PRN    Anxiety  -lexapro  -xanax PRN  -melatonin PRN      FEN: DIET RENAL; Low Sodium (2 GM)  Dietary Nutrition Supplements: Standard High Calorie Oral Supplement  PPx: lovenox   CODE: Limited  Dispo: GMF  PT/OT: consulted     The patient and / or the family were informed of the results of any tests, a time was given to answer questions, a plan was proposed and they agreed with plan. Patient discussed with attending Carlos Solis MD and plan was agreed upon. Nancy Espinoza, PGY-1  [employer]   5/5/2019  10:21 AM     Addendum to Resident H& P/Progress note:  I have personally seen,examined and evaluated the patient. I have reviewed the current history, physical findings, labs and assessment and plan and agree with note as documented by resident MD ( Ginny Moe)  + iron deficiency anemia, most probably, secondary to chronic blood loss. Will start IV Venofer.   Will need GI eval  + CKD stage 4  + chronic systolic heart failure    Carlos Solis MD, [address]

## 2019-05-05 NOTE — ED NOTES
Pt resting in bed no needs at this time. Call light in reach will continue to monitor.       Leni Kenney RN  05/05/19 [id number]

## 2019-05-05 NOTE — ED PROVIDER NOTES
[address] [facility]          ATTENDING PHYSICIAN NOTE       Date of evaluation: 5/5/2019    Chief Complaint     Shortness of Breath      History of Present Illness     Leeann Srivastava is a 80 y.o. male who presents with 2 weeks of shortness of breath, which reports is getting so worse over that time period, but not acutely worse this morning. He reports it's essentially dyspnea on exertion, when he sitting still he feels like he is okay, but even short periods of walking make him extremely winded. He says he had no point has chest pain. No nausea or vomiting. No fevers or chills. He does have occasional cough but mostly dry and nonproductive. Denies extremity swelling    Review of Systems     Review of Systems  Pertinent positives and negatives listed in HPI; otherwise systems are reviewed and are negative  Past Medical, Surgical, Family, and Social History     He has a past medical history of Anemia, Anxiety, Atrial fibrillation ([facility] [address] CAD (coronary artery disease), CHF (congestive heart failure) ([facility] [address] Chronic renal insufficiency, Gout, History of BPH, Hyperlipidemia, Hypertension, and Hypothyroidism. He has a past surgical history that includes Pacemaker insertion (2008); Coronary artery bypass graft (2001); shoulder surgery (Right); Colon surgery (2004); Cardiac defibrillator placement; Upper gastrointestinal endoscopy (N/A, 5/8/2019); and Colonoscopy (5/12/2019). His family history includes Dementia in his mother; Emphysema in his father; Other in his brother and father. He reports that he quit smoking about 44 years ago. He has never used smokeless tobacco. He reports that he drinks about 0.6 oz of alcohol per week. He reports that he does not use drugs.     Medications     Discharge Medication List as of 5/14/2019  2:21 PM      CONTINUE these medications which have NOT CHANGED    Details   ALPRAZolam (XANAX) 0.25 MG tablet Take 1 tablet by mouth daily as needed for Sleep for up to 60 days. , Disp-30 tablet, R-1Phone In      docusate sodium (COLACE, DULCOLAX) 100 MG CAPS Take 100 mg by mouth daily, Disp-30 capsule, R-1Print      polyethylene glycol (GLYCOLAX) packet Take 17 g by mouth daily as needed for Constipation, Disp-527 g, R-1Print      escitalopram (LEXAPRO) 5 MG tablet TAKE 1 TABLET BY MOUTH DAILY TO HELP ANXIETY, Disp-90 tablet, R-0Normal      fenofibrate (TRICOR) 54 MG tablet TAKE 1 TABLET BY MOUTH EVERY DAY, Disp-90 tablet, R-1**Patient requests 90 days supply**Normal      vitamin D (CHOLECALCIFEROL) 1000 UNIT TABS tablet Take 1,000 Units by mouth dailyHistorical Med      allopurinol (ZYLOPRIM) 100 MG tablet TAKE 1 TABLET BY MOUTH DAILY, Disp-90 tablet, R-2Normal      Multiple Vitamins-Minerals (CENTRUM SILVER) TABS TAKE 1 TABLET BY MOUTH DAILY, Disp-90 tablet, R-2Normal      levothyroxine (SYNTHROID) 100 MCG tablet TAKE 1 TABLET BY MOUTH DAILY, Disp-90 tablet, R-2Normal      atorvastatin (LIPITOR) 40 MG tablet TAKE 1 TABLET BY MOUTH EVERY MORNING, Disp-90 tablet, R-2Normal      metoprolol succinate (TOPROL XL) 100 MG extended release tablet Take 1 tablet by mouth daily, Disp-90 tablet, R-3Normal      diltiazem (CARDIZEM CD) 120 MG extended release capsule TAKE 1 CAP BY MOUTH DAILY (DX: _________), Disp-30 capsule, R-10Normal      famotidine (PEPCID) 20 MG tablet TAKE 1 TABLET BY MOUTH DAILY AS NEEDED FOR REFLUX, Disp-90 tablet, R-3**Patient requests 90 days supply**Normal      Melatonin 5 MG CAPS TAKE 1 CAPSULE BY MOUTH NIGHTLY AS NEEDED FOR SLEEP, Disp-90 capsule, R-3Normal      fluticasone (FLONASE) 50 MCG/ACT nasal spray USE 2SPRAYS BY NASAL ROUTE DAILY, Disp-3 Bottle, R-3**Patient requests 90 days supply**Normal             Allergies     He is allergic to lactose. Physical Exam     INITIAL VITALS: BP: 107/63, Temp: 97.6 °F (36.4 °C), Pulse: 104, Resp: 20, SpO2: 100 %    Physical Exam   Constitutional: He is oriented to person, place, and time.  He appears well-developed and well-nourished. HENT:   Head: Normocephalic and atraumatic. Eyes: Pupils are equal, round, and reactive to light. EOM are normal.   Neck: Normal range of motion. Cardiovascular: Regular rhythm. Tachycardia   Pulmonary/Chest:   Labored breathing, scattered occasional rale is noted without wheezing; decreased breath sounds right mid and lower lung fields with some dullness to percussion over those areas   Abdominal: Soft. There is no tenderness. Musculoskeletal: Normal range of motion. He exhibits no edema. Neurological: He is alert and oriented to person, place, and time. Skin: He is not diaphoretic. Diagnostic Results     EKG   Ventricular paced rhythm, rate 104, 100% paced rhythm on this initial strip. Of note, there is some concordant ST depression in V2; on review of EKG previously this morphology was noted in V3, and placement of the 2-lead appears somewhat low and lateral, providing this similar morphology. RADIOLOGY:  FL MODIFIED BARIUM SWALLOW W VIDEO   Final Result      1. Silent aspiration with thin liquid barium. XR CHEST PORTABLE   Final Result   1. Worsening bilateral effusions and lower lung airspace disease suggesting pulmonary edema. XR CHEST STANDARD (2 VW)   Final Result   Impression:   1. Right pleural effusion and right lung consolidation decreased from 4/15/2019 chest x-ray.           LABS:   Results for orders placed or performed during the hospital encounter of 05/05/19   CBC   Result Value Ref Range    WBC 5.4 4.0 - 11.0 K/uL    RBC 3.11 (L) 4.20 - 5.90 M/uL    Hemoglobin 9.2 (L) 13.5 - 17.5 g/dL    Hematocrit 28.4 (L) 40.5 - 52.5 %    MCV 91.2 80.0 - 100.0 fL    MCH 29.7 26.0 - 34.0 pg    MCHC 32.6 31.0 - 36.0 g/dL    RDW 17.7 (H) 12.4 - 15.4 %    Platelets 518 979 - 805 K/uL    MPV 7.3 5.0 - 10.5 fL   Comprehensive Metabolic Panel w/ Reflex to MG   Result Value Ref Range    Sodium 139 136 - 145 mmol/L    Potassium reflex Magnesium 3.7 3.5 - 5.1 mmol/L    Chloride 99 99 - 110 mmol/L    CO2 26 21 - 32 mmol/L    Anion Gap 14 3 - 16    Glucose 114 (H) 70 - 99 mg/dL    BUN 87 (HH) 7 - 20 mg/dL    CREATININE 2.3 (H) 0.8 - 1.3 mg/dL    GFR Non-African American 27 (A) >60    GFR  32 (A) >60    Calcium 9.1 8.3 - 10.6 mg/dL    Total Protein 6.6 6.4 - 8.2 g/dL    Alb 3.6 3.4 - 5.0 g/dL    Albumin/Globulin Ratio 1.2 1.1 - 2.2    Total Bilirubin 0.4 0.0 - 1.0 mg/dL    Alkaline Phosphatase 45 40 - 129 U/L    ALT 8 (L) 10 - 40 U/L    AST 19 15 - 37 U/L    Globulin 3.0 g/dL   Troponin   Result Value Ref Range    Troponin 0.03 (H) <0.01 ng/mL   Brain Natriuretic Peptide   Result Value Ref Range    Pro-BNP 19,771 (H) 0 - 449 pg/mL   Protime-INR   Result Value Ref Range    Protime 12.8 9.8 - 13.0 sec    INR 1.12 0.86 - 1.14   APTT   Result Value Ref Range    aPTT 31.0 26.0 - 36.0 sec   Troponin   Result Value Ref Range    Troponin 0.04 (H) <0.01 ng/mL   Iron and TIBC   Result Value Ref Range    Iron 23 (L) 59 - 158 ug/dL    TIBC 384 260 - 445 ug/dL    Iron Saturation 6 (L) 20 - 50 %   Ferritin   Result Value Ref Range    Ferritin 32.0 30.0 - 400.0 ng/mL   Transferrin   Result Value Ref Range    Transferrin 334.0 200.0 - 360.0 mg/dL   Basic Metabolic Panel w/ Reflex to MG   Result Value Ref Range    Sodium 143 136 - 145 mmol/L    Potassium reflex Magnesium 4.3 3.5 - 5.1 mmol/L    Chloride 104 99 - 110 mmol/L    CO2 26 21 - 32 mmol/L    Anion Gap 13 3 - 16    Glucose 117 (H) 70 - 99 mg/dL    BUN 86 (HH) 7 - 20 mg/dL    CREATININE 2.6 (H) 0.8 - 1.3 mg/dL    GFR Non-African American 23 (A) >60    GFR  28 (A) >60    Calcium 9.0 8.3 - 10.6 mg/dL   CBC auto differential   Result Value Ref Range    WBC 5.4 4.0 - 11.0 K/uL    RBC 2.91 (L) 4.20 - 5.90 M/uL    Hemoglobin 8.6 (L) 13.5 - 17.5 g/dL    Hematocrit 26.6 (L) 40.5 - 52.5 %    MCV 91.5 80.0 - 100.0 fL    MCH 29.7 26.0 - 34.0 pg    MCHC 32.4 31.0 - 36.0 g/dL    RDW 17.6 (H) 12.4 - 15.4 % Platelets 620 804 - 082 K/uL    MPV 7.6 5.0 - 10.5 fL    Neutrophils % 67.6 %    Lymphocytes % 19.6 %    Monocytes % 10.9 %    Eosinophils % 0.9 %    Basophils % 1.0 %    Neutrophils # 3.6 1.7 - 7.7 K/uL    Lymphocytes # 1.1 1.0 - 5.1 K/uL    Monocytes # 0.6 0.0 - 1.3 K/uL    Eosinophils # 0.1 0.0 - 0.6 K/uL    Basophils # 0.1 0.0 - 0.2 K/uL   Blood occult stool #1   Result Value Ref Range    Occult Blood Diagnostic Result: POSITIVE  Normal range: Negative   (A)    Basic Metabolic Panel w/ Reflex to MG   Result Value Ref Range    Sodium 144 136 - 145 mmol/L    Potassium reflex Magnesium 3.7 3.5 - 5.1 mmol/L    Chloride 106 99 - 110 mmol/L    CO2 25 21 - 32 mmol/L    Anion Gap 13 3 - 16    Glucose 120 (H) 70 - 99 mg/dL    BUN 93 (HH) 7 - 20 mg/dL    CREATININE 2.3 (H) 0.8 - 1.3 mg/dL    GFR Non-African American 27 (A) >60    GFR  32 (A) >60    Calcium 8.6 8.3 - 10.6 mg/dL   CBC auto differential   Result Value Ref Range    WBC 5.4 4.0 - 11.0 K/uL    RBC 2.34 (L) 4.20 - 5.90 M/uL    Hemoglobin 7.1 (L) 13.5 - 17.5 g/dL    Hematocrit 21.8 (L) 40.5 - 52.5 %    MCV 92.8 80.0 - 100.0 fL    MCH 30.4 26.0 - 34.0 pg    MCHC 32.7 31.0 - 36.0 g/dL    RDW 17.8 (H) 12.4 - 15.4 %    Platelets 015 083 - 840 K/uL    MPV 7.6 5.0 - 10.5 fL    Neutrophils % 69.2 %    Lymphocytes % 16.0 %    Monocytes % 11.9 %    Eosinophils % 2.5 %    Basophils % 0.4 %    Neutrophils # 3.7 1.7 - 7.7 K/uL    Lymphocytes # 0.9 (L) 1.0 - 5.1 K/uL    Monocytes # 0.6 0.0 - 1.3 K/uL    Eosinophils # 0.1 0.0 - 0.6 K/uL    Basophils # 0.0 0.0 - 0.2 K/uL   Hemoglobin and hematocrit, blood   Result Value Ref Range    Hemoglobin 7.1 (L) 13.5 - 17.5 g/dL    Hematocrit 22.1 (L) 40.5 - 52.5 %   CBC   Result Value Ref Range    WBC 9.6 4.0 - 11.0 K/uL    RBC 2.98 (L) 4.20 - 5.90 M/uL    Hemoglobin 9.0 (L) 13.5 - 17.5 g/dL    Hematocrit 27.5 (L) 40.5 - 52.5 %    MCV 92.6 80.0 - 100.0 fL    MCH 30.1 26.0 - 34.0 pg    MCHC 32.5 31.0 - 36.0 g/dL    RDW 16. 5 (H) 12.4 - 15.4 %    Platelets 889 220 - 264 K/uL    MPV 7.5 5.0 - 10.5 fL   Hemoglobin and hematocrit, blood   Result Value Ref Range    Hemoglobin 8.4 (L) 13.5 - 17.5 g/dL    Hematocrit 26.5 (L) 40.5 - 52.5 %   Basic Metabolic Panel w/ Reflex to MG   Result Value Ref Range    Sodium 147 (H) 136 - 145 mmol/L    Potassium reflex Magnesium 3.4 (L) 3.5 - 5.1 mmol/L    Chloride 109 99 - 110 mmol/L    CO2 24 21 - 32 mmol/L    Anion Gap 14 3 - 16    Glucose 111 (H) 70 - 99 mg/dL    BUN 83 (HH) 7 - 20 mg/dL    CREATININE 2.2 (H) 0.8 - 1.3 mg/dL    GFR Non-African American 28 (A) >60    GFR  34 (A) >60    Calcium 8.6 8.3 - 10.6 mg/dL   CBC   Result Value Ref Range    WBC 7.7 4.0 - 11.0 K/uL    RBC 2.74 (L) 4.20 - 5.90 M/uL    Hemoglobin 8.3 (L) 13.5 - 17.5 g/dL    Hematocrit 25.1 (L) 40.5 - 52.5 %    MCV 91.3 80.0 - 100.0 fL    MCH 30.1 26.0 - 34.0 pg    MCHC 33.0 31.0 - 36.0 g/dL    RDW 17.0 (H) 12.4 - 15.4 %    Platelets 766 115 - 561 K/uL    MPV 7.3 5.0 - 10.5 fL   Hemoglobin and hematocrit, blood   Result Value Ref Range    Hemoglobin 8.4 (L) 13.5 - 17.5 g/dL    Hematocrit 25.3 (L) 40.5 - 52.5 %   Magnesium   Result Value Ref Range    Magnesium 2.50 (H) 1.80 - 2.40 mg/dL   CBC   Result Value Ref Range    WBC 5.4 4.0 - 11.0 K/uL    RBC 2.53 (L) 4.20 - 5.90 M/uL    Hemoglobin 7.5 (L) 13.5 - 17.5 g/dL    Hematocrit 23.1 (L) 40.5 - 52.5 %    MCV 91.3 80.0 - 100.0 fL    MCH 29.6 26.0 - 34.0 pg    MCHC 32.4 31.0 - 36.0 g/dL    RDW 16.5 (H) 12.4 - 15.4 %    Platelets 842 351 - 549 K/uL    MPV 7.3 5.0 - 10.5 fL   Basic Metabolic Panel w/ Reflex to MG   Result Value Ref Range    Sodium 149 (H) 136 - 145 mmol/L    Potassium reflex Magnesium 5.4 (H) 3.5 - 5.1 mmol/L    Chloride 114 (H) 99 - 110 mmol/L    CO2 24 21 - 32 mmol/L    Anion Gap 11 3 - 16    Glucose 107 (H) 70 - 99 mg/dL    BUN 73 (H) 7 - 20 mg/dL    CREATININE 1.9 (H) 0.8 - 1.3 mg/dL    GFR Non- 33 (A) >60    GFR  40 (A) >60    Calcium 8.7 8.3 - 10.6 mg/dL   CBC   Result Value Ref Range    WBC 6.0 4.0 - 11.0 K/uL    RBC 3.11 (L) 4.20 - 5.90 M/uL    Hemoglobin 9.4 (L) 13.5 - 17.5 g/dL    Hematocrit 29.1 (L) 40.5 - 52.5 %    MCV 93.5 80.0 - 100.0 fL    MCH 30.4 26.0 - 34.0 pg    MCHC 32.5 31.0 - 36.0 g/dL    RDW 16.7 (H) 12.4 - 15.4 %    Platelets 909 184 - 301 K/uL    MPV 7.3 5.0 - 10.5 fL   Renal function panel   Result Value Ref Range    Sodium 144 136 - 145 mmol/L    Potassium 4.8 3.5 - 5.1 mmol/L    Chloride 111 (H) 99 - 110 mmol/L    CO2 24 21 - 32 mmol/L    Anion Gap 9 3 - 16    Glucose 116 (H) 70 - 99 mg/dL    BUN 67 (H) 7 - 20 mg/dL    CREATININE 2.2 (H) 0.8 - 1.3 mg/dL    GFR Non-African American 28 (A) >60    GFR  34 (A) >60    Calcium 8.9 8.3 - 10.6 mg/dL    Phosphorus 2.4 (L) 2.5 - 4.9 mg/dL    Alb 3.1 (L) 3.4 - 5.0 g/dL   CBC   Result Value Ref Range    WBC 7.2 4.0 - 11.0 K/uL    RBC 3.24 (L) 4.20 - 5.90 M/uL    Hemoglobin 9.8 (L) 13.5 - 17.5 g/dL    Hematocrit 30.5 (L) 40.5 - 52.5 %    MCV 94.3 80.0 - 100.0 fL    MCH 30.2 26.0 - 34.0 pg    MCHC 32.1 31.0 - 36.0 g/dL    RDW 16.7 (H) 12.4 - 15.4 %    Platelets 559 008 - 559 K/uL    MPV 7.9 5.0 - 10.5 fL   Basic Metabolic Panel w/ Reflex to MG   Result Value Ref Range    Sodium 148 (H) 136 - 145 mmol/L    Potassium reflex Magnesium 4.2 3.5 - 5.1 mmol/L    Chloride 113 (H) 99 - 110 mmol/L    CO2 23 21 - 32 mmol/L    Anion Gap 12 3 - 16    Glucose 102 (H) 70 - 99 mg/dL    BUN 70 (H) 7 - 20 mg/dL    CREATININE 2.1 (H) 0.8 - 1.3 mg/dL    GFR Non-African American 30 (A) >60    GFR  36 (A) >60    Calcium 8.9 8.3 - 10.6 mg/dL   CBC   Result Value Ref Range    WBC 6.1 4.0 - 11.0 K/uL    RBC 3.18 (L) 4.20 - 5.90 M/uL    Hemoglobin 9.7 (L) 13.5 - 17.5 g/dL    Hematocrit 29.6 (L) 40.5 - 52.5 %    MCV 93.2 80.0 - 100.0 fL    MCH 30.4 26.0 - 34.0 pg    MCHC 32.6 31.0 - 36.0 g/dL    RDW 16.2 (H) 12.4 - 15.4 %    Platelets 890 344 - 881 K/uL    MPV 7.8 5.0 - 10.5 fL   CBC   Result Value Ref Range    WBC 6.0 4.0 - 11.0 K/uL    RBC 3.23 (L) 4.20 - 5.90 M/uL    Hemoglobin 9.7 (L) 13.5 - 17.5 g/dL    Hematocrit 30.8 (L) 40.5 - 52.5 %    MCV 95.4 80.0 - 100.0 fL    MCH 30.2 26.0 - 34.0 pg    MCHC 31.6 31.0 - 36.0 g/dL    RDW 17.2 (H) 12.4 - 15.4 %    Platelets 345 527 - 319 K/uL    MPV 7.6 5.0 - 10.5 fL   Basic Metabolic Panel w/ Reflex to MG   Result Value Ref Range    Sodium 149 (H) 136 - 145 mmol/L    Potassium reflex Magnesium 3.6 3.5 - 5.1 mmol/L    Chloride 111 (H) 99 - 110 mmol/L    CO2 25 21 - 32 mmol/L    Anion Gap 13 3 - 16    Glucose 100 (H) 70 - 99 mg/dL    BUN 67 (H) 7 - 20 mg/dL    CREATININE 1.7 (H) 0.8 - 1.3 mg/dL    GFR Non- 38 (A) >60    GFR  46 (A) >60    Calcium 8.6 8.3 - 10.6 mg/dL   CBC   Result Value Ref Range    WBC 4.6 4.0 - 11.0 K/uL    RBC 3.00 (L) 4.20 - 5.90 M/uL    Hemoglobin 9.2 (L) 13.5 - 17.5 g/dL    Hematocrit 28.0 (L) 40.5 - 52.5 %    MCV 93.4 80.0 - 100.0 fL    MCH 30.5 26.0 - 34.0 pg    MCHC 32.7 31.0 - 36.0 g/dL    RDW 16.8 (H) 12.4 - 15.4 %    Platelets 575 561 - 993 K/uL    MPV 7.6 5.0 - 10.5 fL   Basic Metabolic Panel w/ Reflex to MG   Result Value Ref Range    Sodium 148 (H) 136 - 145 mmol/L    Potassium reflex Magnesium 3.4 (L) 3.5 - 5.1 mmol/L    Chloride 110 99 - 110 mmol/L    CO2 27 21 - 32 mmol/L    Anion Gap 11 3 - 16    Glucose 92 70 - 99 mg/dL    BUN 43 (H) 7 - 20 mg/dL    CREATININE 1.5 (H) 0.8 - 1.3 mg/dL    GFR Non- 44 (A) >60    GFR  53 (A) >60    Calcium 8.3 8.3 - 10.6 mg/dL   CBC   Result Value Ref Range    WBC 3.9 (L) 4.0 - 11.0 K/uL    RBC 2.58 (L) 4.20 - 5.90 M/uL    Hemoglobin 7.9 (L) 13.5 - 17.5 g/dL    Hematocrit 24.0 (L) 40.5 - 52.5 %    MCV 92.8 80.0 - 100.0 fL    MCH 30.5 26.0 - 34.0 pg    MCHC 32.8 31.0 - 36.0 g/dL    RDW 17.7 (H) 12.4 - 15.4 %    Platelets 093 684 - 331 K/uL    MPV 7.4 5.0 - 10.5 fL   Magnesium   Result Value Ref Range g/dL    Hematocrit 25.1 (L) 40.5 - 52.5 %   POCT Venous   Result Value Ref Range    pH, Mary 7.443 7.350 - 7.450    pCO2, Mary 38.8 (L) 40.0 - 50.0 mm Hg    pO2, Mary 23 Not Established mm Hg    HCO3, Venous 26.6 23.0 - 29.0 mmol/L    Base Excess, Mary 2 -3 - 3    O2 Sat, Mary 41 Not Established %    TC02 (Calc), Mary 28 Not Established mmol/L    Lactate 1.39 0.40 - 2.00 mmol/L    Sample Type MARY     Performed on SEE BELOW    POCT Glucose   Result Value Ref Range    POC Glucose 102 (H) 70 - 99 mg/dl    Performed on ACCU-CHEK    EKG 12 Lead   Result Value Ref Range    Ventricular Rate 104 BPM    Atrial Rate 131 BPM    QRS Duration 200 ms    Q-T Interval 468 ms    QTc Calculation (Bazett) 615 ms    R Axis -87 degrees    T Axis 88 degrees    Diagnosis       EKG performed in ER and to be interpreted by ER physician. Confirmed by MD, SONDRA ([id number]),  Pawel BERG)KRISTIAN ([id number]) on 5/6/2019 9:26:40 AM   EKG 12 Lead   Result Value Ref Range    Ventricular Rate 63 BPM    Atrial Rate 63 BPM    P-R Interval 326 ms    QRS Duration 102 ms    Q-T Interval 466 ms    QTc Calculation (Bazett) 476 ms    P Axis -17 degrees    R Axis -71 degrees    T Axis 173 degrees    Diagnosis       Atrial-paced rhythm with prolonged AV conduction with occasional Premature ventricular complexesLeft axis deviationAnterior infarct , age undeterminedST & T wave abnormality, consider lateral ischemiaAbnormal ECGConfirmed by CRISTINA COTTRELL MD ([id number]) on 5/9/2019 1:00:51 PM   TYPE AND SCREEN   Result Value Ref Range    ABO/Rh AB NEG     Antibody Screen NEG    PREPARE RBC (CROSSMATCH), 2 Units   Result Value Ref Range    Product Code Blood Bank [id number]     Description Blood Bank Red Blood Cells, Leuko-reduced     Unit Number [id number]     Dispense Status Blood Bank released     Product Code Blood Bank [id number]     Description Blood Bank Red Blood Cells, Apheresis, Leuko-reduced     Unit Number [id number]     Dispense Status Blood Bank transfused     Product Code Blood Bank [id number]     Description Blood Bank Red Blood Cells, Apheresis, Leuko-reduced     Unit Number [id number]     Dispense Status Blood Bank transfused        ED BEDSIDE ULTRASOUND:      RECENT VITALS:  BP: (!) 137/52,Temp: 96.5 °F (35.8 °C), Pulse: 60, Resp: 16, SpO2: 94 %     Procedures         ED Course     Nursing Notes, Past Medical Hx, Past Surgical Hx, Social Hx,Allergies, and Family Hx were reviewed.     The patient was given the following medications:  Orders Placed This Encounter   Medications    DISCONTD: acetaminophen (TYLENOL) tablet 650 mg    DISCONTD: allopurinol (ZYLOPRIM) tablet 100 mg    DISCONTD: ALPRAZolam (XANAX) tablet 0.25 mg    DISCONTD: aspirin EC tablet 81 mg    DISCONTD: atorvastatin (LIPITOR) tablet 40 mg    DISCONTD: clopidogrel (PLAVIX) tablet 75 mg    DISCONTD: docusate sodium (COLACE) capsule 100 mg    DISCONTD: escitalopram (LEXAPRO) tablet 5 mg    DISCONTD: famotidine (PEPCID) tablet 20 mg    DISCONTD: fenofibrate (TRICOR) tablet 54 mg    DISCONTD: fluticasone (FLONASE) 50 MCG/ACT nasal spray 2 spray    DISCONTD: levothyroxine (SYNTHROID) tablet 100 mcg    DISCONTD: melatonin tablet 5 mg    DISCONTD: therapeutic multivitamin-minerals 1 tablet    DISCONTD: polyethylene glycol (GLYCOLAX) packet 17 g    DISCONTD: vitamin D (CHOLECALCIFEROL) tablet 1,000 Units    DISCONTD: sodium chloride flush 0.9 % injection 10 mL    DISCONTD: sodium chloride flush 0.9 % injection 10 mL    DISCONTD: magnesium hydroxide (MILK OF MAGNESIA) 400 MG/5ML suspension 30 mL    DISCONTD: ondansetron (ZOFRAN) injection 4 mg    DISCONTD: enoxaparin (LOVENOX) injection 40 mg    0.9 % sodium chloride infusion    DISCONTD: diltiazem (CARDIZEM CD) extended release capsule 120 mg    DISCONTD: furosemide (LASIX) tablet 40 mg    DISCONTD: metoprolol succinate (TOPROL XL) extended release tablet 100 mg    DISCONTD: heparin (porcine) injection 5,000 Units    DISCONTD: iron sucrose consolidation. Pt in the ED can get approx 10 feet in ambulation before becoming markedly dyspneic and hypoxic, no O2 at home available,  Spoke to PCP's office and AOD for admission for dyspnea and hypoxia. Critical Care:  Due to the immediate potential for life-threatening deterioration due to respiratory failure, metabolic failure, I spent 35 minutes providing critical care. Thistime excludes time spent performing procedures but includes time spent on direct patient care, history retrieval, review of the chart, and discussions with patient, family, and consultant(s). Clinical Impression     1. Chronic congestive heart failure, unspecified heart failure type ([address]    2.  Dyspnea on exertion        Disposition     PATIENT REFERRED TO:  Karla Hi MD  [address]          Janelle Sanchez MD  [facility] [address] [facility] [address] [id number]  [phone]          0 [address] [address]  214 [facility], [address] [place] [address]  [phone]    Schedule an appointment as soon as possible for a visit in 1 week        DISCHARGE MEDICATIONS:  Discharge Medication List as of 5/14/2019  2:21 PM          DISPOSITION Admitted 05/05/2019 10:09:37 AM       Apollo Oconnell MD  05/05/19 2257       Apollo Oconnell MD  05/22/19 [id number]

## 2019-05-05 NOTE — ED NOTES
Pt ambulated with walker. Pt was not able to ambulate but approx 15 ft from bed with out being so SOB he could not continue. Pt's O2 sats while moving was 88%. When in bed O2 Sats up to mid 90's. MD aware. Will continue to monitor.       Marcia Maldonado RN  05/05/19 5847

## 2019-05-06 NOTE — PLAN OF CARE
Problem: Falls - Risk of:  Goal: Will remain free from falls  Description  Will remain free from falls  Outcome: Ongoing  Note:   Fall risk precautions in place. Pt compliant with fall risk protocols this shift. Pt using the urinal in bed with alarm on and bed in lowest position with wheels locked. Problem: Cardiac:  Goal: Ability to maintain vital signs within normal range will improve  Description  Ability to maintain vital signs within normal range will improve  Outcome: Ongoing  Note:   Pt BP running low this shift but responded well to fluid resuscitation. Will continue to monitor stability of VS this shift.    Vitals:    05/05/19 1054 05/05/19 1450 05/1934 05/05/19 2352   BP: 122/73  (!) 108/58 137/67   Pulse: 65  (!) 41 52   Resp: 18 18 18 16   Temp: 98 °F (36.7 °C) 98 °F (36.7 °C) 97.6 °F (36.4 °C) 97.7 °F (36.5 °C)   TempSrc: Oral Oral Oral Oral   SpO2: 98% 98% 98% 95%   Weight: 118 lb 9.7 oz (53.8 kg)      Height: 5' 8\" (1.727 m)

## 2019-05-06 NOTE — PLAN OF CARE
Problem: Falls - Risk of:  Goal: Will remain free from falls  Description  Will remain free from falls  5/6/2019 1038 by Nelda Farias RN  Note:   Patient complains of feeling weak. Fall precautions are in place. Call light and frequently used items are within pt's reach. Bed is in the lowest position with side rails up X 2/4. Non-skid footwear is on . The wheels of the bed are locked. Problem: Falls - Risk of:  Goal: Absence of physical injury  Description  Absence of physical injury  Note:   No falls or injuries occurred. Will continue with current safety precautions for now.

## 2019-05-06 NOTE — PROGRESS NOTES
Occupational Therapy   Occupational Therapy Initial Assessment/Treatment  Date: 2019   Patient Name: Jayme Marc  MRN: [id number]     : 1925    Date of Service: 2019    Discharge Recommendations:  Jayme Marc scored a 19/24 on the AM-PAC ADL Inpatient form. Current research shows that an AM-PAC score of 18 or greater is typically associated with a discharge to the patient's home setting. Based on the patients AM-PAC score and their current ADL deficits, it is recommended that the patient have 2-3 sessions per week of Occupational Therapy at d/c to increase the patients independence. OT Equipment Recommendations  Equipment Needed: No    Assessment   Performance deficits / Impairments: Decreased functional mobility ; Decreased ADL status; Decreased endurance  Assessment: Pt ADLs and mobility limited this date by dizzness when standing and BATEMAN. Pt requires frequent seated rest breaks. With encouragement, pt was able to complete toileting/grooming tasks and hallway mobility. Pt would benefit from ongoing OT tx upon d/c to maximize return of function and independence  Treatment Diagnosis: Impaired activity tolerance decreasing ADL and mobility independence  Prognosis: Good  Decision Making: Low Complexity  Patient Education: OT role, activity promotion- pt verb understanding  REQUIRES OT FOLLOW UP: Yes  Activity Tolerance  Activity Tolerance: Patient limited by fatigue  Activity Tolerance: dizziness and SOB  Safety Devices  Safety Devices in place: Yes  Type of devices: Nurse notified; Chair alarm in place;Call light within reach; Left in chair(pt initially declined sitting in chair- agreeable to sit up 30 min with encouragement)           Patient Diagnosis(es): There were no encounter diagnoses.      has a past medical history of Anemia, Anxiety, Atrial fibrillation (HCC), CAD (coronary artery disease), CHF (congestive heart failure) ([facility] [address] Chronic renal insufficiency, Gout, History of BPH, Hyperlipidemia, Hypertension, and Hypothyroidism. has a past surgical history that includes Pacemaker insertion (2008); Coronary artery bypass graft (2001); shoulder surgery; Colon surgery (2004); and Cardiac defibrillator placement. Treatment Diagnosis: Impaired activity tolerance decreasing ADL and mobility independence      Restrictions  Position Activity Restriction  Other position/activity restrictions: up with assist    Subjective   General  Chart Reviewed: Yes  Additional Pertinent Hx: Pt admit 5/5 with SOB with activity and dizziness related to over diuresis. PMHx: anemia, anxiety, afib, CHF, CAD, HTN, debbrillator, CABG  Family / Caregiver Present: No  Diagnosis: dyspnea  Subjective  Subjective: Pt in bed upon entry, initially declined therapy \"I know I need therapy, but I don't know if I can right now\" Pt agreeable to mobility to bathroom. Denies pain    Social/Functional History  Social/Functional History  Lives With: Spouse  Type of Home: Apartment([facility])  Home Layout: One level  Home Access: Level entry  Bathroom Shower/Tub: Walk-in shower  Bathroom Equipment: Built-in shower seat, Grab bars in shower  Home Equipment: Rolling walker, 4 wheeled walker, Alert Button(rail on bed)  ADL Assistance: Independent(stands in shower, \"water doesn't reach to the seat\")  Homemaking Assistance: (walks to dining room for meals, cleaning provided, pt does laundry)  Ambulation Assistance: Independent(with walker)  Transfer Assistance: Independent  Active : No(recently stopped ; Edgardo Elias has transport to MD appointments )  Additional Comments: Reports wife \"almost blind\" and uses walker as well. Reports he has to help wife at times. Pt denies recent falls. Getting PT/OT at [facility].          Objective   Vision: Within Functional Limits(reading glasses)  Hearing: Exceptions to [facility]  Hearing Exceptions: Hard of hearing/hearing concerns;Bilateral hearing aid Orientation  Overall Orientation Status: Within Functional Limits     Balance  Sitting Balance: Independent  Standing Balance: Stand by assistance(static stance)  Standing Balance  Time: 30 sec, 2 min x2, 1 min  Activity: bathroom mobility, grooming, toileting, hallway mobility      Functional Mobility  Functional - Mobility Device: Rolling Walker  Activity: To/from bathroom; Other(hallway)  Assist Level: Contact guard assistance  Functional Mobility Comments: Vc for safe walker management when turning. Pt reported dizziness and SOB with mobility. O2 sat 98% on RA post-ambulation. BP seated 107/63. BP standing 111/36. RN made aware    Toilet Transfers  Toilet - Technique: Ambulating(w/ RW)  Equipment Used: Standard bedside commode(over toilet)  Toilet Transfer: Contact guard assistance      ADL  Grooming: Independent(wash hands (sba stance at sink))  Toileting: Contact guard assistance(urinated seated; hygiene seated after BM. cga during clothing mgmt)        Bed mobility  Supine to Sit: Stand by assistance  Transfers  Sit to stand: Contact guard assistance(eob, recliner)  Stand to sit: Contact guard assistance(recliner x2)     Cognition  Overall Cognitive Status: WFL                 LUE AROM (degrees)  LUE AROM : WFL  RUE AROM (degrees)  RUE AROM : WFL  LUE Strength  Gross LUE Strength: WFL  RUE Strength  Gross RUE Strength: WFL          Treatment consisted of:  ADLs, transfer training, education on activity promotion and fall precautions.              Plan   Plan  Times per week: 2-5x  Times per day: Daily             AM-PAC Score        AM-[facility] Inpatient Daily Activity Raw Score: 19  AM-PAC Inpatient ADL T-Scale Score : 40.22  ADL Inpatient CMS 0-100% Score: 42.8  ADL Inpatient CMS G-Code Modifier : CK    Goals  Short term goals  Time Frame for Short term goals: by discharge  Short term goal 1: Pt tolerate standing 8 min for ADL/mobility for ADLs  Short term goal 2: supervision LB dressing  Short term goal 3: supervision toileting       Therapy Time   Individual Concurrent Group Co-treatment   Time In 1125         Time Out 1205         Minutes 40         Timed Code Treatment Minutes:   25  Total Treatment Minutes:  36     If patient is d/c prior to next treatment session, this note will serve as the discharge summary    Alyssa Wesley OTR/L, [address], OT

## 2019-05-06 NOTE — PROGRESS NOTES
Internal Medicine PGY-3 Resident Progress Note        PCP: Cheo Whitfield MD    Date of Admission: 5/5/2019    Chief Complaint: dizziness, SOB    Subjective: No acute events overnight. Pt. Feels tired, denies weakness or dizziness, except on ambulation. Otherwise denies N/V, abdominal pain, chest pain, SOB, fevers, chills. Medications:  Reviewed    Infusion Medications   Scheduled Medications    sodium chloride  500 mL Intravenous Once    allopurinol  100 mg Oral Daily    aspirin  81 mg Oral Daily    atorvastatin  40 mg Oral QAM    clopidogrel  75 mg Oral Daily    docusate sodium  100 mg Oral Daily    escitalopram  5 mg Oral Daily    famotidine  20 mg Oral Daily    fenofibrate  54 mg Oral Daily    levothyroxine  100 mcg Oral Daily    therapeutic multivitamin-minerals  1 tablet Oral Daily    vitamin D  1,000 Units Oral Daily    sodium chloride flush  10 mL Intravenous 2 times per day    diltiazem  120 mg Oral Daily    metoprolol succinate  100 mg Oral Daily    heparin (porcine)  5,000 Units Subcutaneous 3 times per day    iron sucrose  100 mg Intravenous Q24H     PRN Meds: acetaminophen, ALPRAZolam, fluticasone, melatonin, polyethylene glycol, sodium chloride flush, magnesium hydroxide, ondansetron      Intake/Output Summary (Last 24 hours) at 5/6/2019 1450  Last data filed at 5/6/2019 1404  Gross per 24 hour   Intake 1149 ml   Output 930 ml   Net 219 ml       Physical Exam Performed:    /66   Pulse 82   Temp 97.6 °F (36.4 °C) (Oral)   Resp 16   Ht 5' 8\" (1.727 m)   Wt 119 lb 11.4 oz (54.3 kg)   SpO2 98%   BMI 18.20 kg/m²     General appearance: No apparent distress, appears stated age and cooperative. HEENT: Pupils equal, round, and reactive to light. Conjunctivae/corneas clear. Neck: Supple, with full range of motion. No jugular venous distention. Trachea midline. Respiratory:  Normal respiratory effort.  Clear to auscultation, bilaterally without Rales/Wheezes/Rhonchi. Cardiovascular: Regular rate and rhythm with normal S1/S2 without murmurs, rubs or gallops. Abdomen: Soft, non-tender, non-distended with normal bowel sounds. Musculoskeletal: No clubbing, cyanosis or edema bilaterally. Full range of motion without deformity. Skin: Skin color, texture, turgor normal.  No rashes or lesions. Neurologic:  Neurovascularly intact without any focal sensory/motor deficits. Cranial nerves: II-XII intact, grossly non-focal.  Psychiatric: Alert and oriented, thought content appropriate, normal insight  Capillary Refill: Brisk,< 3 seconds   Peripheral Pulses: +2 palpable, equal bilaterally       Labs:   Recent Labs     05/05/19  0731 05/06/19  0528   WBC 5.4 5.4   HGB 9.2* 8.6*   HCT 28.4* 26.6*    201     Recent Labs     05/05/19  0731 05/06/19  0528    143   K 3.7 4.3   CL 99 104   CO2 26 26   BUN 87* 86*   CREATININE 2.3* 2.6*   CALCIUM 9.1 9.0     Recent Labs     05/05/19  0731   AST 19   ALT 8*   BILITOT 0.4   ALKPHOS 45     Recent Labs     05/05/19  0731   INR 1.12     Recent Labs     05/05/19  0731 05/05/19  0926   TROPONINI 0.03* 0.04*       Urinalysis:      Lab Results   Component Value Date    NITRU Negative 04/12/2019    BLOODU Negative 04/12/2019    SPECGRAV 1.010 04/12/2019    GLUCOSEU Negative 04/12/2019    GLUCOSEU Negative 06/20/2011       Radiology:  XR CHEST STANDARD (2 VW)   Final Result   Impression:   1. Right pleural effusion and right lung consolidation decreased from 4/15/2019 chest x-ray. Assessment/Plan:    Active Hospital Problems    Diagnosis Date Noted    Dyspnea on exertion [R06.09] 05/05/2019    Volume depletion [E86.9]     Chronic systolic heart failure (HCC) [I50.22]     Dizziness [R42] 06/20/2011     Dizziness, [place]: likely from over diuresis + anemia.  Patient had orthostatic BPs on admission  - IVF hydration  - hold Lasix, need to restart prior to discharge  - PT/OT    FRANTZ: FOBT pending, though no overt bleeding present  - Cont. venofer  - GI consult for poss. GI bleed. Chronic Medical Problems:     Gout   -allopurinol      CAD s/p CABG  -plavix    -asa     Afib  -home diltiazem     HFrEF  -metoprolol with holding parameters     Hypothyroidism  -levothyroxine 100 mcg     HLD  -fenofibrate  -lipitor     Constipation  -colace  -miralax PRN     Anxiety  -lexapro  -xanax PRN    FEN: DIET RENAL; Low Sodium (2 GM)  Dietary Nutrition Supplements: Standard High Calorie Oral Supplement  PPx: lovenox   CODE: Limited  Dispo: GMF  PT/OT: consulted     I will discuss the patient with  MD Carmenza Santos MD  Internal Medicine, PGY 3  Contact on [employer]    Addendum to Resident H& P/Progress note:  I have personally seen,examined and evaluated the patient.  I have reviewed the current history, physical findings, labs and assessment and plan and agree with note as documented by resident MD ( Yoel Alcala)      Guille Segovia MD, [address]

## 2019-05-06 NOTE — CARE COORDINATION
[address] Transitions Interview     2019    Patient: Robin Vargas  Patient : 1925   MRN: [id number]   Reason for Admission: Dyspnea on Exertion  RARS: Readmission Risk Score: 32         Spoke with: Robin Every        Readmission Risk  Patient Active Problem List   Diagnosis    Hypertrophy of prostate without urinary obstruction and other lower urinary tract symptoms (LUTS)    Disorder of kidney and ureter    Gout    Acquired hypothyroidism    GERD (gastroesophageal reflux disease)    Coronary atherosclerosis    Light headed    Dizziness    Carotid artery stenosis    Automatic implantable cardioverter-defibrillator in situ    Paroxysmal ventricular tachycardia (HCC)    CKD (chronic kidney disease) stage 4, GFR 15-29 ml/min (HCC)    Hyperlipidemia    Essential hypertension    Hypothyroidism due to acquired atrophy of thyroid    Foot trauma    Anxiety    Diarrhea    Atrial tachycardia (HCC)    Chronic atrial fibrillation (HCC)    MGUS (monoclonal gammopathy of unknown significance)    Iron deficiency anemia    Acute on chronic diastolic heart failure (HCC)    Non-sustained ventricular tachycardia (HCC)    BATEMAN (dyspnea on exertion)    Chronic systolic heart failure (HCC)    Pleural effusion transudative    Paroxysmal atrial fibrillation (HCC)    Hypokalemia    Pneumonia    Hypertensive urgency    Pleural effusion on right    Dyspnea on exertion    Volume depletion       Inpatient Assessment  Care Transitions Summary    Care Transitions Inpatient Review  Medication Review  Do you have all of your prescriptions and are they filled?:  Yes (Comment: The SN at [facility] manages his medications and he states they have been giving him newly scheduled meds since DC home yesterday. )   Barriers to Medication Adherence:  Complexity of regimen  Are you able to afford your medications?:  Yes  How often do you have difficulty taking your medications?:  I always take them as prescribed. Housing Review  Who do you live with?:  Partner/Spouse/SO  Are you an active caregiver in your home?:  Yes  For whom are you the caregiver?:  provides some assistance to his spouse. Social Support  Do you have a ?:  No  Do you have a [facility]?:  Yes  [facility] Name:  [facility]   [address] [place] [address]  S:  [phone]  Durable Medical Equipment  Patient DME:  Brice walker, Straight cane, Shower chair, Other  Other Patient DME:  grab bars, alert button, [facility]  Functional Review  Ability to seek help/take action for Emergent/Urgent situations i.e. fire, crime, inclement weather or health crisis.:  Needs Assistance  Ability handle personal hygiene needs (bathing/dressing/grooming): Independent  Ability to manage medications:  Needs Assistance  Ability to prepare food:  Dependent  Ability to maintain home (clean home, laundry):  Dependent  Ability to drive and/or has transportation:  Needs Assistance  Ability to do shopping:  Dependent  Ability to manage finances:  Needs Assistance  Is patient able to live independently?:  No  Hearing and Vision  Visual Impairment:  Visual impairment (Glasses/contacts)  Hearing Impairment:  Wears hearing aids, Hard of hearing (Comment: Bilateral)  Care Transitions Interventions  No Identified Needs       Summary  CTC spoke with patient this afternoon for initial face to face interview. Patient is very familiar to CTC team, has been followed in the past with ongoing follow up CTC calls in home, after discharged. Patient still resides at [facility] with spouse. Patient states he still uses a walker with ambulation, staff managed medications and transportation to and from appointments. CTC again explained CTC program, and ongoing follow up once discharged, patient is agreeable at this time. Patient states he was active with [facility] with SN, PT/OT.   Patient would like to resume services at discharge. Follow Up  Future Appointments   Date Time Provider Gwen Perez   5/16/2019 10:00 AM Jean-Pierre Torrez MD [facility]   6/6/2019  3:15 PM Billy Cleveland MD [facility]   6/27/2019 10:00 AM Jesi Diehl MD Edna Card [facility]   7/9/2019  3:45 AM SCHEDULE, Elizabeth Speaks PHONE TRANSMISSION Edna Card [facility]       Health Maintenance  There are no preventive care reminders to display for this patient.     Kem Goff RN

## 2019-05-06 NOTE — PROGRESS NOTES
Physical Therapy    Facility/Department: [place] [address] PCU  Initial Assessment and Treatment    NAME: Loyda Dowell  : 1925  MRN: [id number]    Date of Service: 2019    Discharge [address] scored a 17/24 on the AM-PAC short mobility form. Current research shows that an AM-PAC score of 17 or less is typically not associated with a discharge to the patient's home setting. Based on the patients AM-PAC score and their current functional mobility deficits, it is recommended that the patient have 3-5 sessions per week of Physical Therapy at d/c to increase the patients independence. PT Equipment Recommendations  Equipment Needed: No    Assessment   Body structures, Functions, Activity limitations: Decreased functional mobility ; Decreased endurance;Decreased balance  Assessment: Pt with decreased independent mobility from baseline. Pt reports normally independent with walker. Currently needing CGA for safety. Limited by c/o dizziness. Pt at risk for falls and not safe to ambulate alone. Rec cont skilled PT to maximize mobility and independence  Treatment Diagnosis: impaired functional mobility 2/2 decreased endurance  Decision Making: Low Complexity  Patient Education: Educated pt on role of PT, use of call light, importance of OOB. Pt verbalized understanding  REQUIRES PT FOLLOW UP: Yes       Patient Diagnosis(es): There were no encounter diagnoses. has a past medical history of Anemia, Anxiety, Atrial fibrillation (HCC), CAD (coronary artery disease), CHF (congestive heart failure) ([facility] [address] Chronic renal insufficiency, Gout, History of BPH, Hyperlipidemia, Hypertension, and Hypothyroidism. has a past surgical history that includes Pacemaker insertion (); Coronary artery bypass graft (); shoulder surgery; Colon surgery (); and Cardiac defibrillator placement.     Restrictions  Position Activity Restriction  Other position/activity restrictions: up with assist  Vision/Hearing  Vision: Within Functional Limits(reading glasses)  Hearing: Exceptions to [facility]  Hearing Exceptions: Hard of hearing/hearing concerns;Bilateral hearing aid     Subjective  General  Chart Reviewed: Yes  Additional Pertinent Hx: Pt is a 80 y.o. male adm 5/5 with BATEMAN. Came to ED with SOB. CXR: R pleural effusion and R lung consolidation. PMH: anxiety, Afib, CAD< CHF, gout, hyperlipidemia, HTN, pacemaker, CABG  Referring Practitioner: Malcolm Mcmillan MD  Referral Date : 05/05/19  Subjective  Subjective: Pt found in supine. Agreeable to PT with encouragement. Pain Screening  Patient Currently in Pain: Denies  Vital Signs  Patient Currently in Pain: Denies       Orientation  Orientation  Overall Orientation Status: Within Functional Limits  Social/Functional History  Social/Functional History  Lives With: Spouse  Type of Home: Apartment([facility])  Home Layout: One level  Home Access: Level entry  Bathroom Shower/Tub: Walk-in shower  Bathroom Equipment: Built-in shower seat, Grab bars in shower  Home Equipment: Rolling walker, 4 wheeled walker, Alert Button(rail on bed)  ADL Assistance: Independent(stands in shower, \"water doesn't reach to the seat\")  Homemaking Assistance: (walks to dining room for meals, cleaning provided, pt does laundry)  Ambulation Assistance: Independent(with walker)  Transfer Assistance: Independent  Active : No(recently stopped ; Arnie Brewer has transport to MD appointments )  Additional Comments: Reports wife \"almost blind\" and uses walker as well. Reports he has to help wife at times. Pt denies recent falls. Getting PT/OT at [facility].     Cognition        Objective          AROM RLE (degrees)  RLE AROM: WFL  AROM LLE (degrees)  LLE AROM : WFL  Strength RLE  Strength RLE: WFL  Strength LLE  Strength LLE: WFL        Bed mobility  Supine to Sit: Stand by assistance  Transfers  Sit to Stand: Contact guard assistance  Stand to sit: Contact guard assistance(cues for safety backing up)  Ambulation  Ambulation?: Yes  Ambulation 1  Device: Rolling Walker  Assistance: Contact guard assistance  Quality of Gait: decreased kaylee, decreased bilat step length/height, mild BATEMAN  Distance: 10', 80'  Comments: Pt c/o feeling dizzy while ambulating. BP taken once sitting back in chair 107/63, 98%. Symptoms improved once sitting. Pt stood again with walker and BP taken in standing 111/36 with c/o dizziness. RN informed     Balance  Standing - Static: (CGA with walker)      Treatment included: bed mobility, transfers, gt, pt education    Plan   Plan  Times per week: 2-5  Current Treatment Recommendations: Balance Training, Functional Mobility Training, Endurance Training, Gait Training, Safety Education & Training, Patient/Caregiver Education & Training  Safety Devices  Type of devices: Call light within reach, Chair alarm in place, Nurse notified, Left in chair    G-Code       OutComes Score                                                  AM-PAC Score  AM-PAC Inpatient Mobility Raw Score : 17  AM-PAC Inpatient T-Scale Score : 42.13  Mobility Inpatient CMS 0-100% Score: 50.57  Mobility Inpatient CMS G-Code Modifier : CK          Goals  Short term goals  Time Frame for Short term goals: By discharge  Short term goal 1: Sup to sit supervision  Short term goal 2: Pt will transfer sit to stand supervision  Short term goal 3: Pt will amb >100' with AD supervision       Therapy Time   Individual Concurrent Group Co-treatment   Time In 1127         Time Out 1208         Minutes 41              Timed Code Treatment Minutes:26       Total Treatment Minutes:  41  If pt d/c'd prior to next treatment, this note serves as a discharge note.       Gaston Reid, PT

## 2019-05-07 NOTE — PROGRESS NOTES
Medical resident responded to message, however he was not able to see the pop-up. Stated he is septic. Will continue to monitor.

## 2019-05-07 NOTE — PROGRESS NOTES
with full range of motion. No jugular venous distention. Trachea midline. Respiratory:  Normal respiratory effort. Clear to auscultation, bilaterally without Rales/Wheezes/Rhonchi. Cardiovascular: Regular rate and rhythm with normal S1/S2 without murmurs, rubs or gallops. Abdomen: Soft, non-tender, non-distended with normal bowel sounds. Musculoskeletal: No clubbing, cyanosis or edema bilaterally. Full range of motion without deformity. Skin: Skin color, texture, turgor normal.  No rashes or lesions. Neurologic:  Neurovascularly intact without any focal sensory/motor deficits. Cranial nerves: II-XII intact, grossly non-focal.  Psychiatric: Alert and oriented, thought content appropriate, normal insight  Capillary Refill: Brisk,< 3 seconds   Peripheral Pulses: +2 palpable, equal bilaterally           LABS:    CBC:   Recent Labs     05/05/19  0731 05/06/19  0528 05/07/19  0509 05/07/19  0658   WBC 5.4 5.4 5.4  --    HGB 9.2* 8.6* 7.1* 7.1*   HCT 28.4* 26.6* 21.8* 22.1*   MCV 91.2 91.5 92.8  --     201 178  --                                                                 BMP:    Recent Labs     05/05/19  0731 05/06/19  0528 05/07/19  0509    143 144   K 3.7 4.3 3.7   CL 99 104 106   CO2 26 26 25   BUN 87* 86* 93*   CREATININE 2.3* 2.6* 2.3*   GLUCOSE 114* 117* 120*       LFT's:   Recent Labs     05/05/19  0731   AST 19   ALT 8*   BILITOT 0.4   ALKPHOS 45       Troponin:   Recent Labs     05/05/19  0731 05/05/19  0926   TROPONINI 0.03* 0.04*       BNP: No results for input(s): BNP in the last 72 hours. Lipids: No results for input(s): CHOL, HDL in the last 72 hours.     Invalid input(s): LDLCALCU    ABGs: No results found for: PHART, GHR0CWI, PO2ART    INR:   Recent Labs     05/05/19  0731   INR 1.12       U/A:No results for input(s): NITRITE, COLORU, PHUR, LABCAST, WBCUA, RBCUA, MUCUS, TRICHOMONAS, YEAST, BACTERIA, CLARITYU, SPECGRAV, LEUKOCYTESUR, UROBILINOGEN, BILIRUBINUR, BLOODU, GLUCOSEU, AMORPHOUS in the last 72 hours. Invalid input(s): Torey Martinez   -----------------------------------------------------------------  RAD:   XR CHEST STANDARD (2 VW)   Final Result   Impression:   1. Right pleural effusion and right lung consolidation decreased from 4/15/2019 chest x-ray. Assessment/Plan:     # Acute Blood Loss Anemia Likely 2/2 GI Bleed  Hb dropped to 7.0, positive FOBT on digital rectal examination. Discontinued all ASA/Plavix/Sub q Heparin. GI consulted and following. Will likely need Upper/Lower GI scope. Fe levels found to be deficient.    - GI consulted  - 2 large peripheral bores  - Venofer x 3 doses  - Discontinued ASA/Plavix/Heparin  - SCD boots  - Pepcid 20 mg PO qD  - Will need Scope  - Transfuse  Hb < 8 in setting of Hx of CABG  - H/H every 12 hours    # Dizziness 2/2 GI Bleed  - Holding Lasix for now, does not appear fluid overloaded  - Management as per above    # Fe Deficiency Anemia  Fe 23, Iron Sat 6, Positive FOBT  - Treat with Venofer 100 mg IV x 3 doses    # CAD s/p CABG and AICD placement  Holding home ASA and Plavix     # Afib  - Diltiazem      # HFrEF  -Metoprolol with holding parameters    # Gout   - Allopurinol      # Hypothyroidism  - Levothyroxine 100 mcg     # HLD  -fenofibrate  -lipitor     # Constipation  -colace  -miralax PRN     # Anxiety  -lexapro  -xanax PRN     FEN: DIET RENAL; Low Sodium (2 GM)  Dietary Nutrition Supplements: Standard High Calorie Oral Supplement  PPx: lovenox   CODE: Limited  Dispo: [facility]  PT/OT: consulted      I will discuss the patient with  MD Jc Herrera MD  5/7/2019  9:49 AM  Pager:  [phone]    Addendum to Resident H& P/Progress note:  I have personally seen,examined and evaluated the patient.  I have reviewed the current history, physical findings, labs and assessment and plan and agree with note as documented by resident MD [facility])      Mary Maxwell MD, [address]

## 2019-05-07 NOTE — CONSULTS
GI:    Mr Margo Arita was seen, examined and evaluated. Entire records including past records were reviewed at length    Ass:  Sudden drop in H and H with resultant iron deficiency anemia. This likely has contributed to his symptoms on admission. this is not surprising in a man with significant heart disease as articulated in Dr Fay Garcia note. Plan:  I took the liberty of holding his plavix in anticipation of performing endoscopic evaluation of upper ad lower GI tract. He  Must be off plavix for five days .  In addition since dehydration was  felt to be present, this will allow time for volume repletion that will be needed to prevent hypotension in a sedated patient  during the procedures     Will discuss with Dr Donavon Conde    Thank you very much     teri crouch m.d.  5-6-19

## 2019-05-07 NOTE — CARE COORDINATION
[address] Transitions Interview     2019    Patient: Maranda Werner  Patient : 1925   MRN: [id number]   Reason for Admission: Dyspnea with Exertion   RARS: Readmission Risk Score: 22         Spoke with: Maranda Werner        Readmission Risk  Patient Active Problem List   Diagnosis    Hypertrophy of prostate without urinary obstruction and other lower urinary tract symptoms (LUTS)    Disorder of kidney and ureter    Gout    Acquired hypothyroidism    GERD (gastroesophageal reflux disease)    Coronary atherosclerosis    Light headed    Dizziness    Carotid artery stenosis    Automatic implantable cardioverter-defibrillator in situ    Paroxysmal ventricular tachycardia (HCC)    CKD (chronic kidney disease) stage 4, GFR 15-29 ml/min (HCC)    Hyperlipidemia    Essential hypertension    Hypothyroidism due to acquired atrophy of thyroid    Foot trauma    Anxiety    Diarrhea    Atrial tachycardia (HCC)    Chronic atrial fibrillation (HCC)    MGUS (monoclonal gammopathy of unknown significance)    Iron deficiency anemia    Acute on chronic diastolic heart failure (HCC)    Non-sustained ventricular tachycardia (HCC)    BATEMAN (dyspnea on exertion)    Chronic systolic heart failure (HCC)    Pleural effusion transudative    Paroxysmal atrial fibrillation (HCC)    Hypokalemia    Pneumonia    Hypertensive urgency    Pleural effusion on right    Dyspnea on exertion    Volume depletion       Inpatient Assessment  Care Transitions Summary    Care Transitions Inpatient Review  Medication Review  Do you have all of your prescriptions and are they filled?:  Yes (Comment: The SN at [facility] manages his medications and he states they have been giving him newly scheduled meds since DC home yesterday. )   Barriers to Medication Adherence:  Complexity of regimen  Are you able to afford your medications?:  Yes  How often do you have difficulty taking your medications?:  I always patient.     Tayler Ji RN

## 2019-05-07 NOTE — CARE COORDINATION
medical care with assist of AL staff. Pharmacy: The [address] manages medications   Potential Assistance Purchasing Medications:  No  Does patient want to participate in local refill/meds to beds program?: Not Assessed    Goals of Care  Patient expects to be discharged to[de-identified] home   Patient plans for SNF: YES; referral sent to [address] [place] [address] for review for SNF admission.      Mode of transport from hospital: family     Factors facilitating achievement of predicted outcomes: Family support, Caregiver support, Cooperative, Pleasant, Has needed Durable Medical Equipment at home, Home is wheelchair accessible and Knowledge about rehab    Barriers to discharge: Medical complications and Medication managment     Kathy Madrid RN  [facility]  Case Management Department  Ph: [phone] Fax: [phone]

## 2019-05-07 NOTE — PROGRESS NOTES
Message was sent to Dr. Foreign Nicolas him that pt's son is here and wants to speak with him. Awaiting response.

## 2019-05-07 NOTE — PROGRESS NOTES
PRBC transfusion is complete. No signs or symptoms of reaction noted. Patient is resting comfortably in bed. call light and frequently used items are within pt's reach. Offers no complaints at this time.

## 2019-05-07 NOTE — PLAN OF CARE
Problem: Falls - Risk of:  Goal: Absence of physical injury  Description  Absence of physical injury  Outcome: Ongoing  Note:   Pt compliant with fall risk protocols and remains free of falls at this time. Problem: Cardiac:  Goal: Ability to maintain vital signs within normal range will improve  Description  Ability to maintain vital signs within normal range will improve  Outcome: Ongoing  Note:   Pt VSS this shift. Will continue to monitor for s/s of cardiovascular function impairment.    Vitals:    05/06/19 1115 05/06/19 1542 05/06/19 2034 05/06/19 2345   BP: 124/66 121/67 (!) 122/47 124/60   Pulse: 82 60 60 58   Resp: 16 16 16 16   Temp: 97.6 °F (36.4 °C) 97.6 °F (36.4 °C) 97.6 °F (36.4 °C) 97.4 °F (36.3 °C)   TempSrc: Oral Oral Oral Oral   SpO2: 98% 98% 95% 93%   Weight:       Height:

## 2019-05-07 NOTE — PROGRESS NOTES
Patient was partially incontinent of a very large black, tarry hard formed stool. Patient was assisted to the bathroom to complete the BM. Cielo care care was performed by patient. Patient was assisted back to bed with the use of a walker. Will continue with current plan of care for now.

## 2019-05-08 NOTE — PLAN OF CARE
Problem: Falls - Risk of:  Goal: Will remain free from falls  Description  Will remain free from falls  Outcome: Ongoing   Fall precautions are in place. Bed alarm is on and in lowest position. Pt is using call light appropriately. Will continue to monitor. Problem: Cardiac:  Goal: Ability to maintain vital signs within normal range will improve  Description  Ability to maintain vital signs within normal range will improve  Outcome: Ongoing   VSS. Pt denies dizziness at this time. Will continue to monitor.    Vitals:    05/08/19 0004   BP: (!) 117/59   Pulse: 91   Resp: 20   Temp: 98.1 °F (36.7 °C)   SpO2: 95%

## 2019-05-08 NOTE — PLAN OF CARE
Problem: Falls - Risk of:  Goal: Will remain free from falls  Description  Will remain free from falls  Note:   Pt had no falls this shift. Pt is a Fall Risk. See Elmon Law Fall Risk Score. Pt bed in low position and side rails up. Call light and belongings in reach. Pt encouraged to call for assistance. Will continue with hourly rounds for PO intake, pain needs, toileting, and repositioning as needed.

## 2019-05-08 NOTE — ANESTHESIA PRE PROCEDURE
Department of Anesthesiology  Preprocedure Note       Name:  Fronie Lefort   Age:  80 y.o.  :  1925                                          MRN:  [id number]         Date:  2019      Surgeon: Kira Gordon):  Louis Vaughn MD    Procedure: EGD ESOPHAGOGASTRODUODENOSCOPY (N/A )    Medications prior to admission:   Prior to Admission medications    Medication Sig Start Date End Date Taking? Authorizing Provider   ALPRAZolam [place]) 0.25 MG tablet Take 1 tablet by mouth daily as needed for Sleep for up to 60 days.  5/3/19 7/2/19 Yes Omar Martinez MD   furosemide (LASIX) 80 MG tablet Take 0.5 tablets by mouth 2 times daily 19  Yes CHANO Baron CNP   escitalopram (LEXAPRO) 5 MG tablet TAKE 1 TABLET BY MOUTH DAILY TO HELP ANXIETY 19  Yes Omar Martinez MD   fenofibrate (TRICOR) 54 MG tablet TAKE 1 TABLET BY MOUTH EVERY DAY 19  Yes Omar Martinez MD   ASPIRIN LOW DOSE 81 MG EC tablet TAKE 1 TABLET BY MOUTH DAILY 3/8/19  Yes Mara Cheng MD   vitamin D (CHOLECALCIFEROL) 1000 UNIT TABS tablet Take 1,000 Units by mouth daily   Yes Historical Provider, MD   clopidogrel (PLAVIX) 75 MG tablet TAKE 1 TABLET BY MOUTH DAILY 19  Yes Mara Cheng MD   allopurinol (ZYLOPRIM) 100 MG tablet TAKE 1 TABLET BY MOUTH DAILY 18  Yes Mara Cheng MD   Multiple Vitamins-Minerals (CENTRUM SILVER) TABS TAKE 1 TABLET BY MOUTH DAILY 18  Yes Mara Cheng MD   levothyroxine (SYNTHROID) 100 MCG tablet TAKE 1 TABLET BY MOUTH DAILY 12/3/18  Yes Mara Cheng MD   atorvastatin (LIPITOR) 40 MG tablet TAKE 1 TABLET BY MOUTH EVERY MORNING 10/1/18  Yes Mara Cheng MD   metoprolol succinate (TOPROL XL) 100 MG extended release tablet Take 1 tablet by mouth daily 18  Yes CHANO Robles CNP   diltiazem (CARDIZEM CD) 120 MG extended release capsule TAKE 1 CAP BY MOUTH DAILY (DX: _________) 18  Yes Mara Cheng MD   famotidine (PEPCID) 20 MG tablet TAKE 1 TABLET BY MOUTH DAILY AS NEEDED FOR REFLUX 17  Yes Mara Cheng MD Acetaminophen (APAP) 325 MG TABS Take 650 mg by mouth every 6 hours as needed for Pain or Fever 11/8/17  Yes Magdalene Churchill MD   Melatonin 5 MG CAPS TAKE 1 CAPSULE BY MOUTH NIGHTLY AS NEEDED FOR SLEEP 11/3/17  Yes Magdalene Churchill MD   fluticasone [facility]) 50 MCG/ACT nasal spray USE 2SPRAYS BY NASAL ROUTE DAILY 11/3/17  Yes Magdalene Churchill MD   docusate sodium (COLACE, DULCOLAX) 100 MG CAPS Take 100 mg by mouth daily 4/19/19   Josephine Izaguirre MD   polyethylene glycol [facility]) packet Take 17 g by mouth daily as needed for Constipation 4/18/19 5/18/19  Josephine Izaguirre MD       Current medications:    Current Facility-Administered Medications   Medication Dose Route Frequency Provider Last Rate Last Dose    potassium chloride (KLOR-CON M) extended release tablet 40 mEq  40 mEq Oral BID Sim Yasmeen        pantoprazole (PROTONIX) tablet 40 mg  40 mg Oral QAM AC Ashu Abayev   40 mg at 05/07/19 0932    acetaminophen (TYLENOL) tablet 650 mg  650 mg Oral Q6H PRN Lucía Betts MD        allopurinol (ZYLOPRIM) tablet 100 mg  100 mg Oral Daily Lucía Betts MD   100 mg at 05/08/19 0750    ALPRAZolam (XANAX) tablet 0.25 mg  0.25 mg Oral Daily PRN Lucía Betts MD        atorvastatin (LIPITOR) tablet 40 mg  40 mg Oral QAM Lucía Betts MD   40 mg at 05/08/19 0750    docusate sodium (COLACE) capsule 100 mg  100 mg Oral Daily Lucía Betts MD   100 mg at 05/08/19 0750    escitalopram (LEXAPRO) tablet 5 mg  5 mg Oral Daily Lucía Betts MD   5 mg at 05/08/19 0750    fenofibrate (TRICOR) tablet 54 mg  54 mg Oral Daily Lucía Betts MD   54 mg at 05/08/19 0750    fluticasone (FLONASE) 50 MCG/ACT nasal spray 2 spray  2 spray Each Nare Daily PRN Lucía Betts MD        levothyroxine (SYNTHROID) tablet 100 mcg  100 mcg Oral Daily Lucía Betts MD   100 mcg at 05/08/19 0750    melatonin tablet 5 mg  5 mg Oral Nightly PRN Lucía Betts MD   5 mg at 05/06/19 2036    therapeutic multivitamin-minerals 1 tablet  1 tablet Oral Daily Kyree Sandy MD   1 tablet at 05/08/19 0750    polyethylene glycol (GLYCOLAX) packet 17 g  17 g Oral Daily PRN Kyree Sandy MD        vitamin D (CHOLECALCIFEROL) tablet 1,000 Units  1,000 Units Oral Daily Kyree Sandy MD   1,000 Units at 05/08/19 0750    sodium chloride flush 0.9 % injection 10 mL  10 mL Intravenous 2 times per day Kyree Sandy MD   10 mL at 05/08/19 0751    sodium chloride flush 0.9 % injection 10 mL  10 mL Intravenous PRN Kyree Sandy MD   10 mL at 05/07/19 1613    magnesium hydroxide (MILK OF MAGNESIA) 400 MG/5ML suspension 30 mL  30 mL Oral Daily PRN Kyree Sandy MD        [facility]) injection 4 mg  4 mg Intravenous Q6H PRN Kyree Sandy MD        diltiazem (CARDIZEM CD) extended release capsule 120 mg  120 mg Oral Daily Kyree Sandy MD   120 mg at 05/08/19 0750    metoprolol succinate (TOPROL XL) extended release tablet 100 mg  100 mg Oral Daily Kyree Sandy MD   100 mg at 05/08/19 0750       Allergies:     Allergies   Allergen Reactions    Lactose        Problem List:    Patient Active Problem List   Diagnosis Code    Hypertrophy of prostate without urinary obstruction and other lower urinary tract symptoms (LUTS) N40.0    Disorder of kidney and ureter N28.9    Gout M10.9    Acquired hypothyroidism E03.9    GERD (gastroesophageal reflux disease) K21.9    Coronary atherosclerosis I25.10    Light headed R42    Dizziness R42    Carotid artery stenosis I65.29    Automatic implantable cardioverter-defibrillator in situ Z95.810    Paroxysmal ventricular tachycardia (HCC) I47.2    CKD (chronic kidney disease) stage 4, GFR 15-29 ml/min (HCC) N18.4    Hyperlipidemia E78.5    Essential hypertension I10    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.4    Foot trauma S99.929A    Anxiety F41.9    Diarrhea R19.7    Atrial tachycardia (HCC) I47.1    Chronic atrial fibrillation (HCC) I48.2    MGUS (monoclonal gammopathy of unknown significance) D47.2    Iron deficiency anemia D50.9    Acute on chronic diastolic heart failure (HCC) I50.33    Non-sustained ventricular tachycardia (HCC) I47.2    BATEMAN (dyspnea on exertion) R06.09    Chronic systolic heart failure (HCC) I50.22    Pleural effusion transudative J94.8    Paroxysmal atrial fibrillation (HCC) I48.0    Hypokalemia E87.6    Pneumonia J18.9    Hypertensive urgency I16.0    Pleural effusion on right J90    Dyspnea on exertion R06.09    Volume depletion E86.9       Past Medical History:        Diagnosis Date    Anemia     Anxiety     Atrial fibrillation ([address]     CAD (coronary artery disease)     Cardiologist  [address] [place] [address] [facility]    CHF (congestive heart failure) ([facility])     Chronic renal insufficiency     Gout     History of BPH     Hyperlipidemia     Hypertension     Hypothyroidism     hypothyroid       Past Surgical History:        Procedure Laterality Date    CARDIAC DEFIBRILLATOR PLACEMENT      left upper subclavian replaced about 3 yrs ago per Dr. Corrinne Morse      resection/ stents    [facility]      AICD replaced    SHOULDER SURGERY Right     cuff surgery       Social History:    Social History     Tobacco Use    Smoking status: Former Smoker     Last attempt to quit: 1975     Years since quittin.0    Smokeless tobacco: Never Used    Tobacco comment: quit 30+ years ago   Substance Use Topics    Alcohol use:  Yes     Alcohol/week: 0.6 oz     Types: 1 Glasses of wine per week     Comment: SHABBIR                                Counseling given: Not Answered  Comment: quit 30+ years ago      Vital Signs (Current):   Vitals:    19 2009 19 0004 19 0504 19 0737   BP: (!) 125/49 (!) 117/59 (!) 123/52 (!) 127/53   Pulse: 69 91 77 80   Resp: 18 20 20 18   Temp: 97.6 °F (36.4 °C) 98.1 °F (36.7 °C) 97.7 °F (36.5 °C) 97.4 °F (36.3 °C)   TempSrc: Oral Oral Oral Oral   SpO2: 94% 95% 96% 98%   Weight:   121 lb 0.5 oz (54.9 kg)    Height:                                                  BP Readings from Last 3 Encounters:   05/08/19 (!) 127/53   04/25/19 (!) 160/60   04/18/19 116/65       NPO Status: Time of last liquid consumption: 0000                        Time of last solid consumption: 1600                        Date of last liquid consumption: 05/08/19                        Date of last solid food consumption: 05/07/19    BMI:   Wt Readings from Last 3 Encounters:   05/08/19 121 lb 0.5 oz (54.9 kg)   04/25/19 124 lb 3.2 oz (56.3 kg)   04/18/19 119 lb 1.6 oz (54 kg)     Body mass index is 18.4 kg/m². CBC:   Lab Results   Component Value Date    WBC 7.7 05/08/2019    RBC 2.74 05/08/2019    RBC 3.39 03/20/2017    HGB 8.3 05/08/2019    HCT 25.1 05/08/2019    MCV 91.3 05/08/2019    RDW 17.0 05/08/2019     05/08/2019       CMP:   Lab Results   Component Value Date     05/08/2019    K 3.4 05/08/2019     05/08/2019    CO2 24 05/08/2019    BUN 83 05/08/2019    CREATININE 2.2 05/08/2019    GFRAA 34 05/08/2019    GFRAA 29 02/25/2013    AGRATIO 1.2 05/05/2019    LABGLOM 28 05/08/2019    GLUCOSE 111 05/08/2019    GLUCOSE 116 03/20/2017    PROT 6.6 05/05/2019    PROT 6.7 03/20/2017    CALCIUM 8.6 05/08/2019    BILITOT 0.4 05/05/2019    ALKPHOS 45 05/05/2019    AST 19 05/05/2019    ALT 8 05/05/2019       POC Tests: No results for input(s): POCGLU, POCNA, POCK, POCCL, POCBUN, POCHEMO, POCHCT in the last 72 hours.     Coags:   Lab Results   Component Value Date    PROTIME 12.8 05/05/2019    INR 1.12 05/05/2019    APTT 31.0 05/05/2019       HCG (If Applicable): No results found for: PREGTESTUR, PREGSERUM, HCG, HCGQUANT     ABGs: No results found for: PHART, PO2ART, JND1NMA, TYR5IVK, BEART, I2GKZSOS     Type & Screen (If Applicable):  No results found for: LABABO,

## 2019-05-08 NOTE — PROGRESS NOTES
Resident Progress Note      Admit Date: 2019    PCP: Louis Anglin MD                  : 1925  MRN: [id number]    Chief Complaint:  Dizziness    Subjective:   Seen and examined at bedside  No acute events overnight, no apparent distress  Scheduled to undergo EGD today at 11AM  Code Status changed from [facility] to Full Code in preparation for EGD  Discussed with POA, Son, Dr. Elisabeth Dow and he's in aggrement to change code status to Full for EGD  Hb is now 8.3, transfused a total of 1 unit PRBC on 19. He denied chest pain, SOB, palpitations, or dizziness  Had 1x melanous BM yesterday    Diet: Diet NPO Effective Now    Data:   Scheduled Meds:   potassium chloride  40 mEq Oral BID    pantoprazole  40 mg Oral QAM AC    allopurinol  100 mg Oral Daily    atorvastatin  40 mg Oral QAM    docusate sodium  100 mg Oral Daily    escitalopram  5 mg Oral Daily    fenofibrate  54 mg Oral Daily    levothyroxine  100 mcg Oral Daily    therapeutic multivitamin-minerals  1 tablet Oral Daily    vitamin D  1,000 Units Oral Daily    sodium chloride flush  10 mL Intravenous 2 times per day    diltiazem  120 mg Oral Daily    metoprolol succinate  100 mg Oral Daily     Continuous Infusions:  PRN Meds:acetaminophen, ALPRAZolam, fluticasone, melatonin, polyethylene glycol, sodium chloride flush, magnesium hydroxide, ondansetron  I/O last 3 completed shifts: In: 330 [P.O.:330]  Out: 1050 [Urine:1050]  No intake/output data recorded.     Intake/Output Summary (Last 24 hours) at 2019 1026  Last data filed at 2019 0504  Gross per 24 hour   Intake 180 ml   Output 750 ml   Net -570 ml           Objective:     Vitals: BP (!) 127/53   Pulse 80   Temp 97.4 °F (36.3 °C) (Oral)   Resp 18   Ht 5' 8\" (1.727 m)   Wt 121 lb 0.5 oz (54.9 kg)   SpO2 98%   BMI 18.40 kg/m²     Physical Exam  General appearance: Resting comfortably, no apparent distress  HEENT EOMI, PERRL, no LAD, no oropharyngeal erythema, MMM  Lungs: CTAB, no wheezes, rhonchi or rales  Heart: RRR +S1/S2 without murmurs, rubs or gallops  Abdomen: Soft, NTND, without rigidity or guarding, normal active bowel sounds  Extremities: No peripheral edema, good peripheral pulses  Skin: No skin rashes  Neurologic: AAOx4, CNs II-XII grossly intact, moving all extremities spontaneously       LABS:    CBC:   Recent Labs     05/07/19  0509  05/07/19  1753 05/07/19  2221 05/08/19  0500   WBC 5.4  --  9.6  --  7.7   HGB 7.1*   < > 9.0* 8.4* 8.3*   HCT 21.8*   < > 27.5* 25.3* 25.1*   MCV 92.8  --  92.6  --  91.3     --  199  --  172    < > = values in this interval not displayed. BMP:    Recent Labs     05/06/19  0528 05/07/19  0509 05/08/19  0459    144 147*   K 4.3 3.7 3.4*    106 109   CO2 26 25 24   BUN 86* 93* 83*   CREATININE 2.6* 2.3* 2.2*   GLUCOSE 117* 120* 111*       LFT's: No results for input(s): AST, ALT, ALB, BILITOT, ALKPHOS in the last 72 hours. Troponin: No results for input(s): TROPONINI in the last 72 hours. BNP: No results for input(s): BNP in the last 72 hours. Lipids: No results for input(s): CHOL, HDL in the last 72 hours. Invalid input(s): LDLCALCU    ABGs: No results found for: PHART, OTR0JAD, PO2ART    INR: No results for input(s): INR in the last 72 hours. U/A:No results for input(s): NITRITE, COLORU, PHUR, LABCAST, WBCUA, RBCUA, MUCUS, TRICHOMONAS, YEAST, BACTERIA, CLARITYU, SPECGRAV, LEUKOCYTESUR, UROBILINOGEN, BILIRUBINUR, BLOODU, GLUCOSEU, AMORPHOUS in the last 72 hours. Invalid input(s): Car Call   -----------------------------------------------------------------  RAD:   XR CHEST STANDARD (2 VW)   Final Result   Impression:   1. Right pleural effusion and right lung consolidation decreased from 4/15/2019 chest x-ray. Assessment/Plan:     # Acute Blood Loss Anemia Likely 2/2 GI Bleed (Improving)  Scheduled to undergo EGD this morning.   He had 1x

## 2019-05-08 NOTE — ANESTHESIA POSTPROCEDURE EVALUATION
Department of Anesthesiology  Postprocedure Note    Patient: Rachel Curtis  MRN: [id number]  YOB: 1925  Date of evaluation: 5/8/2019  Time:  2:31 PM     Procedure Summary     Date:  05/08/19 Room / Location:  [facility]    Anesthesia Start:  5313 Anesthesia Stop:  9804    Procedure:  EGD ESOPHAGOGASTRODUODENOSCOPY (N/A ) Diagnosis:  (Dyspnea and exertion)    Surgeon:  Gavin Toscano MD Responsible Provider:  Ashley Lopez DO    Anesthesia Type:  MAC ASA Status:  4          Anesthesia Type: MAC    Olinda Phase I: Olinda Score: 10    Olinda Phase II: Olinda Score: 10    Last vitals: Reviewed and per EMR flowsheets.        Anesthesia Post Evaluation    Patient location during evaluation: PACU  Patient participation: complete - patient participated  Level of consciousness: awake and alert  Airway patency: patent  Nausea & Vomiting: no nausea and no vomiting  Cardiovascular status: blood pressure returned to baseline  Respiratory status: acceptable  Hydration status: euvolemic

## 2019-05-08 NOTE — CARE COORDINATION
Case Management Daily Note:    Current Plan of Care: EGD today, monitoring H&H      PT AM-PAC: 17 /24  Per last eval on: 05/06/2019    OT AM-PAC: 18 /24  Per last eval on: 05/06/2019    DME needs: DEFERRED      Discharge Plan: SNF at [address]    Tentative Discharge Date: 05/09/2019    Current Barriers to Discharge: stabilization of H&H, EGD today    Resources/Information given: none at this time      Case Management Notes: CM continues to follow for discharge planning and needs. Patient agreeable to SNF and wants to go to [address] at D/C due to being close to his assisted living facility [facility]. CM has completed HENS online, patient has bed awaiting his discharge at [facility]. Patient down for testing this AM, CM continuing to follow for discharge planning and needs.     Thank you,  Francesca Alvarenga RN,   4th Floor Progressive Care Unit  [phone]

## 2019-05-08 NOTE — PROGRESS NOTES
GI Progress Note    Leida Ortiz is a 80 y.o. male patient. Dizziness  Dyspnea  Volume depletion  CHF  anemia      Admit Date: 2019    Subjective:       No abdominal pain  No nausea or vomiting  Hg down to 8.3        ROS:  Cardiovascular ROS: no chest pain . Has BATEMAN  Gastrointestinal ROS: no abdominal pain, change in bowel habits,had melena last night  Respiratory ROS: no cough, shortness of breath, or wheezing    Scheduled Meds:   potassium chloride  40 mEq Oral BID    pantoprazole  40 mg Oral QAM AC    allopurinol  100 mg Oral Daily    atorvastatin  40 mg Oral QAM    docusate sodium  100 mg Oral Daily    escitalopram  5 mg Oral Daily    fenofibrate  54 mg Oral Daily    levothyroxine  100 mcg Oral Daily    therapeutic multivitamin-minerals  1 tablet Oral Daily    vitamin D  1,000 Units Oral Daily    sodium chloride flush  10 mL Intravenous 2 times per day    diltiazem  120 mg Oral Daily    metoprolol succinate  100 mg Oral Daily           PRN Meds:  acetaminophen, ALPRAZolam, fluticasone, melatonin, polyethylene glycol, sodium chloride flush, magnesium hydroxide, ondansetron      Objective:       Patient Vitals for the past 24 hrs:   BP Temp Temp src Pulse Resp SpO2 Weight   19 1203 (!) 95/51 -- -- -- -- -- --   19 1200 (!) 85/40 -- -- 65 18 94 % --   19 0737 (!) 127/53 97.4 °F (36.3 °C) Oral 80 18 98 % --   19 0504 (!) 123/52 97.7 °F (36.5 °C) Oral 77 20 96 % 121 lb 0.5 oz (54.9 kg)   19 0004 (!) 117/59 98.1 °F (36.7 °C) Oral 91 20 95 % --   19 2009 (!) 125/49 97.6 °F (36.4 °C) Oral 69 18 94 % --   19 1513 (!) 101/58 97.1 °F (36.2 °C) Oral 97 18 96 % --       Exam:    VITALS:  BP (!) 95/51   Pulse 65   Temp 97.4 °F (36.3 °C) (Oral)   Resp 18   Ht 5' 8\" (1.727 m)   Wt 121 lb 0.5 oz (54.9 kg)   SpO2 94%   BMI 18.40 kg/m²   TEMPERATURE:  Current - Temp: 97.4 °F (36.3 °C);  Max - Temp  Av.6 °F (36.4 °C)  Min: 97.1 °F (36.2 °C)  Max: 98.1 °F (36.7 °C)    NAD  General appearance: alert, appears stated age, cooperative and no distress  Head: Normocephalic, without obvious abnormality, atraumatic  Neck: supple, symmetrical, trachea midline and thyroid not enlarged, symmetric, no tenderness/mass/nodules  CVS:  RRR, Nl s1s2  Lungs CTA Bilaterally, normal effort  Abdomen: soft, non-tender; bowel sounds normal; no masses,  no organomegaly  AAOx3, No asterixis or encephalopathy  Extremities: No edema. Lab Data:  Recent Labs     05/07/19  0509  05/07/19  1753 05/07/19  2221 05/08/19  0500   WBC 5.4  --  9.6  --  7.7   HGB 7.1*   < > 9.0* 8.4* 8.3*   HCT 21.8*   < > 27.5* 25.3* 25.1*   MCV 92.8  --  92.6  --  91.3     --  199  --  172    < > = values in this interval not displayed. Recent Labs     05/06/19  0528 05/07/19  0509 05/08/19  0459    144 147*   K 4.3 3.7 3.4*    106 109   CO2 26 25 24   BUN 86* 93* 83*   CREATININE 2.6* 2.3* 2.2*     No results for input(s): AST, ALT, ALB, BILIDIR, BILITOT, ALKPHOS in the last 72 hours. No results for input(s): LIPASE, AMYLASE in the last 72 hours. No results for input(s): PROT, INR in the last 72 hours. No results for input(s): PTT in the last 72 hours. No results for input(s): OCCULTBLD in the last 72 hours. Assessment:       Active Problems:    Dizziness    Chronic systolic heart failure (HCC)    Dyspnea on exertion    Volume depletion  Resolved Problems:    * No resolved hospital problems. *    Melena  anemia    Recommendations:       Given the sudden onset of melena will perform an EGD today. Cardiac status appears stable to proceed.     Asad Breen MD  5/8/2019  12:07 PM

## 2019-05-08 NOTE — FLOWSHEET NOTE
05/08/19 1451   Encounter Summary   Services provided to: Patient   Referral/Consult From: Palliative Care;Rounding   Continue Visiting   (5/8, filippo   sleep )   Complexity of Encounter Low   Length of Encounter 15 minutes   Routine   Type Initial   Assessment Sleeping

## 2019-05-09 NOTE — PROGRESS NOTES
Resident Progress Note      Admit Date: 2019    PCP: Magdalene Churchill MD                  : 1925  MRN: [id number]    Chief Complaint:  Dizziness    Subjective:   Seen and examined at bedside  No acute events overnight, no apparent distress  Overall doing very well, s/p EGD with hiatal hernia, no bleeding  Scheduled Colonoscopy on Saturday  Denied chest pain, SOB, or palpitations  Had to be transfused 1 unit of PRBC last night for Hb of 7.5  Had near choking episode, got SLP, recommended getting Modified Barium either today or tomorrow  Continue with Cardiac Diet in mean time      Diet: DIET FULL LIQUID; Low Sodium (2 GM)      Data:   Scheduled Meds:   sodium chloride  250 mL Intravenous Once    pantoprazole  40 mg Oral QAM AC    allopurinol  100 mg Oral Daily    atorvastatin  40 mg Oral QAM    docusate sodium  100 mg Oral Daily    escitalopram  5 mg Oral Daily    fenofibrate  54 mg Oral Daily    levothyroxine  100 mcg Oral Daily    therapeutic multivitamin-minerals  1 tablet Oral Daily    vitamin D  1,000 Units Oral Daily    sodium chloride flush  10 mL Intravenous 2 times per day    diltiazem  120 mg Oral Daily    metoprolol succinate  100 mg Oral Daily     Continuous Infusions:   sodium chloride       PRN Meds:acetaminophen, ALPRAZolam, fluticasone, melatonin, polyethylene glycol, sodium chloride flush, magnesium hydroxide, ondansetron  I/O last 3 completed shifts: In: 360 [P.O.:360]  Out: 740 [Urine:740]  I/O this shift:   In: 485 [P.O.:60; Blood:425]  Out: -     Intake/Output Summary (Last 24 hours) at 2019 0620  Last data filed at 2019 0523  Gross per 24 hour   Intake 845 ml   Output 440 ml   Net 405 ml           Objective:     Vitals: BP (!) 150/44   Pulse 60   Temp 97.5 °F (36.4 °C) (Oral)   Resp 18   Ht 5' 8\" (1.727 m)   Wt 123 lb 3.8 oz (55.9 kg)   SpO2 96%   BMI 18.74 kg/m²     Physical Exam  General appearance: Resting comfortably, no apparent distress  HEENT EOMI, PERRL, no LAD, no oropharyngeal erythema, MMM  Lungs: CTAB, no wheezes, rhonchi or rales  Heart: RRR +S1/S2 without murmurs, rubs or gallops  Abdomen: Soft, NTND, without rigidity or guarding, normal active bowel sounds  Extremities: No peripheral edema, good peripheral pulses  Skin: No skin rashes  Neurologic: AAOx4, CNs II-XII grossly intact, moving all extremities spontaneously     LABS:    CBC:   Recent Labs     05/08/19  0500 05/08/19 2049 05/09/19  0536   WBC 7.7 5.4 6.0   HGB 8.3* 7.5* 9.4*   HCT 25.1* 23.1* 29.1*   MCV 91.3 91.3 93.5    165 165                                                                BMP:    Recent Labs     05/07/19  0509 05/08/19 0459 05/09/19  0536    147* 149*   K 3.7 3.4* 5.4*    109 114*   CO2 25 24 24   BUN 93* 83* 73*   CREATININE 2.3* 2.2* 1.9*   GLUCOSE 120* 111* 107*       -----------------------------------------------------------------  RAD:   XR CHEST STANDARD (2 VW)   Final Result   Impression:   1. Right pleural effusion and right lung consolidation decreased from 4/15/2019 chest x-ray. Assessment/Plan:     # Acute Blood Loss Anemia Likely 2/2 GI Bleed (Improving)  Underwent EGD (5/8/19) and hiatal hernia discovered, no active bleeding. Will undergo Colonoscopy (5/11/19) after 5 days of Plavix washout. Transfused 1 unit PRBC 5/7/19 for Hb 7.1.   Had to be transfused again over night (5/8/19) for Hb of 7.5   - GI consulted  - Colonoscopy 5/11/19  - Restarted Cardiac Diet  - 2 large peripheral bores  - Venofer x 3 doses (completed for Fe deficiency Anemia)  - Holding ASA/Plavix/Heparin  - SCD boots  - Protonix 40 mg PO qD  - Transfuse  Hb < 8 in setting of Hx of CABG  - H/H every 12 hours    # Hypernatremia and Hyperkalemia  Likely in setting of Dehydration/Poor oral intake and KCl supplementation yesterday  - Started 1/2 NS at 75 cc/hr  - Holding Lasix for now  - Recheck RFP at 3 pm     # Dizziness 2/2 GI Bleed (Improving)  - Holding Lasix for now in setting of hypernatremia     # Fe Deficiency Anemia  Fe 23, Iron Sat 6, Positive FOBT  - Venofer 100 mg IV x 3 doses (Completed)     # CAD s/p CABG and AICD placement  - Holding home ASA and Plavix     # Afib (NSR)  - Diltiazem      # HFrEF  - Metoprolol with holding parameters     # Gout   - Allopurinol      # Hypothyroidism  - Levothyroxine 100 mcg     # HLD  - Fenofibrate  - Lipitor     # Constipation  - Colace  - Miralax PRN     # Anxiety  - Lexapro  - Xanax PRN    FEN: Cardiac Low Na < 2 grams  Dietary Nutrition Supplements: Standard High Calorie Oral Supplement  PPx:  SCD  CODE: Limited No to everything  Dispo: [facility]  PT/OT: consulted     Sim Arana MD  5/9/2019  6:20 AM  Pager:  [phone]    Addendum to Resident H& P/Progress note:  I have personally seen,examined and evaluated the patient. I have reviewed the current history, physical findings, labs and assessment and plan and agree with note as documented by resident MD Kemal Donahue)  Continue gentle IV fluids for hypernatremia. Repeat chest X ray. Will have colonoscopy on Saturday. Will need to have MBS.     Warner Loaiza MD, FACP

## 2019-05-09 NOTE — PROGRESS NOTES
GI Progress Note    Temi Guillory is a 80 y.o. male patient. 1. Chronic congestive heart failure, unspecified heart failure type ([address]    2. Dyspnea on exertion    3. Melena  4. Acute blood loss anemia / iron deficiency  5.      CAD. S/p CABG    Admit Date: 5/5/2019    Subjective:       Feels better  No abdominal pain  h and h stable      ROS:  Cardiovascular ROS: no chest pain or dyspnea on exertion  Gastrointestinal ROS: no abdominal pain, nausea or vomiting.     Respiratory ROS: no cough, shortness of breath, or wheezing    Scheduled Meds:   pantoprazole  40 mg Oral QAM AC    allopurinol  100 mg Oral Daily    atorvastatin  40 mg Oral QAM    docusate sodium  100 mg Oral Daily    escitalopram  5 mg Oral Daily    fenofibrate  54 mg Oral Daily    levothyroxine  100 mcg Oral Daily    therapeutic multivitamin-minerals  1 tablet Oral Daily    vitamin D  1,000 Units Oral Daily    sodium chloride flush  10 mL Intravenous 2 times per day    diltiazem  120 mg Oral Daily    metoprolol succinate  100 mg Oral Daily       Continuous Infusions:      PRN Meds:  acetaminophen, ALPRAZolam, fluticasone, melatonin, polyethylene glycol, sodium chloride flush, magnesium hydroxide, ondansetron      Objective:       Patient Vitals for the past 24 hrs:   BP Temp Temp src Pulse Resp SpO2 Weight   05/09/19 1649 (!) 168/51 97.2 °F (36.2 °C) Oral 60 18 98 % --   05/09/19 1245 (!) 164/71 97.5 °F (36.4 °C) Oral 60 18 97 % --   05/09/19 0805 (!) 172/52 97.5 °F (36.4 °C) Oral 60 17 95 % --   05/09/19 0523 (!) 150/44 97.5 °F (36.4 °C) Oral 60 18 96 % 123 lb 3.8 oz (55.9 kg)   05/09/19 0217 (!) 150/58 97.9 °F (36.6 °C) Oral 60 18 96 % --   05/09/19 0118 (!) 152/58 97.9 °F (36.6 °C) Oral 59 18 95 % --   05/09/19 0009 (!) 146/55 97.9 °F (36.6 °C) Oral 66 18 97 % --   05/09/19 0004 134/61 97.9 °F (36.6 °C) Oral 62 18 96 % --   05/08/19 2359 (!) 156/50 98.1 °F (36.7 °C) Oral 60 18 96 % --   05/08/19 2354 (!) 151/63 98.2 °F (36.8 °C) Oral 67 18 97 % --   19 2347 (!) 123/55 98.2 °F (36.8 °C) Oral 58 18 98 % --   19 2321 (!) 154/55 98.2 °F (36.8 °C) Oral 58 18 96 % --   19 2030 (!) 146/55 97.7 °F (36.5 °C) Oral 59 18 95 % --       Exam:    VITALS:  BP (!) 168/51   Pulse 60   Temp 97.2 °F (36.2 °C) (Oral)   Resp 18   Ht 5' 8\" (1.727 m)   Wt 123 lb 3.8 oz (55.9 kg)   SpO2 98%   BMI 18.74 kg/m²   TEMPERATURE:  Current - Temp: 97.2 °F (36.2 °C); Max - Temp  Av.8 °F (36.6 °C)  Min: 97.2 °F (36.2 °C)  Max: 98.2 °F (36.8 °C)    NAD  General appearance: alert, appears stated age, cooperative and no distress  Head: Normocephalic, without obvious abnormality, atraumatic  Neck: supple, symmetrical, trachea midline and thyroid not enlarged, symmetric, no tenderness/mass/nodules  CVS:  RRR, Nl s1s2  Lungs CTA Bilaterally, normal effort  Abdomen: soft, non-tender; bowel sounds normal; no masses,  no organomegaly  AAOx3, No asterixis or encephalopathy  Extremities: No edema. Lab Data:  Recent Labs     19  0500 19  2049 19  0536   WBC 7.7 5.4 6.0   HGB 8.3* 7.5* 9.4*   HCT 25.1* 23.1* 29.1*   MCV 91.3 91.3 93.5    165 165     Recent Labs     19  0459 19  0536 19  1550   * 149* 144   K 3.4* 5.4* 4.8    114* 111*   CO2 24 24 24   PHOS  --   --  2.4*   BUN 83* 73* 67*   CREATININE 2.2* 1.9* 2.2*     No results for input(s): AST, ALT, ALB, BILIDIR, BILITOT, ALKPHOS in the last 72 hours. No results for input(s): LIPASE, AMYLASE in the last 72 hours. No results for input(s): PROT, INR in the last 72 hours. No results for input(s): PTT in the last 72 hours. No results for input(s): OCCULTBLD in the last 72 hours. Assessment:       Active Problems:    Dizziness    Chronic systolic heart failure (HCC)    Dyspnea on exertion    Volume depletion    Oropharyngeal dysphagia    Hypernatremia  Resolved Problems:    * No resolved hospital problems.  *  acute anemia of blood loss / iron deficiency. S/p blood transfusion  Melena   CAD. S/p CABG  Atrial fibrillation  Hypothyroidism  CHF, stable, no sx  anxiety      Recommendations:       No bleeding source on EGD.  Presently off plavix day # 3  Colonoscopy on Saturday  Discussed with Dr Neel Morejon MD  5/9/2019  7:35 PM

## 2019-05-09 NOTE — PLAN OF CARE
Problem: Falls - Risk of:  Goal: Will remain free from falls  Description  Will remain free from falls  5/9/2019 1137 by Dinora Alva RN  Note:   Pt did not have a fall this shift. Pt is a Fall Risk. See Gosia Kessler Fall Risk Score. Pt bed in low position and side rails up. Call light and belongings in reach. Pt encouraged to call for assistance. Continuing to monitor with hourly rounds for PO intake, pain needs, toileting, and repositioning as needed.

## 2019-05-09 NOTE — PLAN OF CARE
Problem: Falls - Risk of:  Goal: Will remain free from falls  Description  Will remain free from falls  5/9/2019 0608 by Vanesa Velazquez RN  Outcome: Ongoing   Fall precautions are in place. Bed alarm is on and in lowest position. Pt is using call light appropriately. Will continue to monitor. Problem: Cardiac:  Goal: Cardiovascular alteration will improve  Description  Cardiovascular alteration will improve  Outcome: Ongoing   Pt received 1 unit of PRBC. VSS. Paced on tele. Monitoring labs and vital signs.

## 2019-05-09 NOTE — PROGRESS NOTES
Pt aspirated on water with morning meds. SpO2 remains at 95-96%. Lung sounds have not changed after episode. Pt continues to cough to clear throat. MD aware.  SLP ordered  Electronically signed by John Mcdaniels RN on 5/9/2019 at 9:05 AM

## 2019-05-09 NOTE — OP NOTE
[address]               [address] [place], [address]                                OPERATIVE REPORT    PATIENT NAME: Amber Bishop                        :        1925  MED REC NO:   [id number]                          ROOM:       6658  ACCOUNT NO:   [de-identified]                           ADMIT DATE: 2019  PROVIDER:     Brodie Mendieta MD    DATE OF PROCEDURE:  2019    INDICATION FOR PROCEDURE:  Melena with drop in his hemoglobin and  hematocrit. SURGEON:  Brodie Mendieta MD    EGD:  With the patient in the left lateral position and after IV  Diprivan, the Olympus video endoscope was introduced into the esophagus  and advanced towards the GE junction where a very small hiatus hernia  was seen. The stomach was carefully inspected, it was normal.  The  duodenum was normal.  The scope was then removed without complications. IMPRESSION:  Small hiatus hernia. David Matthew MD    D: 2019 12:13:46       T: 2019 14:32:17     [email or web address]  Job#: [id number]     Doc#: [id number]    CC:   MD Swati Saravia MD Willena Conger, MD

## 2019-05-09 NOTE — PROGRESS NOTES
Speech Language Pathology  Facility/Department: [facility] [address] [facility]   BEDSIDE SWALLOW EVALUATION/treatment     NAME: Leida Ortiz  : 1925  MRN: [id number]    ADMISSION DATE: 2019  ADMITTING DIAGNOSIS: has Hypertrophy of prostate without urinary obstruction and other lower urinary tract symptoms (LUTS); Disorder of kidney and ureter; Gout; Acquired hypothyroidism; GERD (gastroesophageal reflux disease); Coronary atherosclerosis; Light headed; Dizziness; Carotid artery stenosis; Automatic implantable cardioverter-defibrillator in situ; Paroxysmal ventricular tachycardia (HCC); CKD (chronic kidney disease) stage 4, GFR 15-29 ml/min ([address] Hyperlipidemia; Essential hypertension; Hypothyroidism due to acquired atrophy of thyroid; Foot trauma; Anxiety; Diarrhea; Atrial tachycardia ([address] Chronic atrial fibrillation (HCC); MGUS (monoclonal gammopathy of unknown significance); Iron deficiency anemia; Acute on chronic diastolic heart failure ([address] Non-sustained ventricular tachycardia ([address] BATEMAN (dyspnea on exertion); Chronic systolic heart failure ([address] Pleural effusion transudative; Paroxysmal atrial fibrillation ([address] Hypokalemia; Pneumonia; Hypertensive urgency; Pleural effusion on right; Dyspnea on exertion; and Volume depletion on their problem list.  ONSET DATE: 19    Recent Chest Xray 19  1.  Right pleural effusion and right lung consolidation decreased from 4/15/2019 chest x-ray    Date of Eval: 2019  Evaluating Therapist: Eduardo Weber    Current Diet level:  Current Diet : Regular  Current Liquid Diet : Thin      Primary Complaint  Patient Complaint: pt c/o difficulty with large pills     Pain: Pt denies pain      Reason for Referral  Leida Ortiz was referred for a bedside swallow evaluation to assess the efficiency of his swallow function, identify signs and symptoms of aspiration and make recommendations regarding safe dietary consistencies, effective compensatory strategies, and safe eating environment. Impression  Pt had episode of choking and aspirating water this morning while taking medication. Pt reports having difficulty swallowing large pills. Oral- ROM or oral muscles was [facility] [place]. No difficulty with mastication with solids. Pharyngeal- pt noted to frequently  \"throw head back\" when eating/drinking. When questioned about this, pt felt this could be a habit. Pt educated to importance of keeping head a midline to increase airway protection. Pt noted to throat clear following most trials, regardless of consistency. Pt with belching following water by cup, which he reports is typical. Pt was able to drink 3oz water continuously without difficulty. Given pt's age, frequent throat clearing and difficulty swallowing large pills, pt would benefit from further instrumental assessment. Dysphagia Diagnosis: Suspected needs further assessment  Dysphagia Outcome Severity Scale: Level 5: Mild dysphagia- Distant supervision. May need one diet consistency restricted     Treatment Plan  Requires SLP Intervention: Yes  Duration/Frequency of Treatment: 3-5x  D/C Recommendations: To be determined       Recommended Diet and Intervention  Diet Solids Recommendation: Regular  Liquid Consistency Recommendation: Thin-Make NPO if s/s aspiration emerge and alert SLP  Will perform MBS either today or tomorrow- when radiology's schedule allows  (discussed with pt and daughter-in-law)   Recommended Form of Meds: Meds in puree  Recommendations: Modified barium swallow study  Therapeutic Interventions: Diet tolerance monitoring;Patient/Family education    Compensatory Swallowing Strategies  Compensatory Swallowing Strategies: Upright as possible for all oral intake;Remain upright for 30-45 minutes after meals; Other (comments)(keep head at midline when swallowing)    Treatment/Goals  1- The patient will tolerate recommended diet without observed clinical signs of aspiration;    2-The patient will tolerate instrumental swallowing procedure when able  5/9-  Will schedule today or tomorrow, when radiology's schedule allows . 2- The pt/family will demonstrate understanding of swallowing recommendations and concerns. 5/9-  The pt and family were educated to purpose of the visit, anatomy and physiology of the swallow, rational for keeping head at midline vs hyperextending back when swallowing, concerns for aspiration, swallowing strategies, diet recommendations and plan to perform MBS when radiology's schedule allows. The pt stated comprehension. con't goal      General  Chart Reviewed: Yes  Behavior/Cognition: Alert; Cooperative;Pleasant mood  Respiratory Status: Room air  Communication Observation: Functional  Follows Directions: Simple  Dentition: Adequate  Patient Positioning: Upright in bed  Baseline Vocal Quality: Normal  Volitional Cough: Strong  Prior Dysphagia History: pt reports difficulty with swallowing large pills- had choking episode this morning  Consistencies Administered: Soft solid; Thin - cup; Thin - straw;Puree           Vision/Hearing  Vision  Vision: Within Functional Limits  Hearing  Hearing: Exceptions to [facility]  Hearing Exceptions: Hard of hearing/hearing concerns;Bilateral hearing aid    Oral Motor Deficits  Oral/Motor  Oral Motor: Within functional limits    Oral Phase Dysfunction  Oral Phase  Oral Phase: WNL     Indicators of Pharyngeal Phase Dysfunction      pt noted to frequently  \"throw head back\" when eating/drinking. When questioned about this, pt felt this could be a habit. Pt educated to importance of keeping head a midline to increase airway protection. Pt noted to throat clear following most trials, regardless of consistency.  Pt with belching following water by cup, which he reports is typical. Pt was able to drink 3oz water continuously without difficulty    Prognosis  Prognosis  Prognosis for safe diet advancement: excellent  Individuals consulted  Consulted and agree with results and recommendations: Patient; Family member;RN  Family member consulted: Stefania Rocha- daughter-in-law     Education  Patient Education: Role of SLP  Patient Education Response: Verbalizes understanding  Safety Devices in place: Yes  Type of devices: Call light within reach              Therapy Time  SLP Individual Minutes  Time In: 0910  Time Out: 0935  Minutes: 25          Pt's goal: to swallow pills without choking      Plan:  Continue goals per POC  Recommended diet:regular with thin liquids-Make NPO if s/s aspiration emerge and alert SLP  Will perform MBS as radiology's schedule allows   Total treatment time:14dx, 11tx  Pt's discharge plan: to go home   Discharge Plan: To be determined closer to discharge  Discussed with Reshma YORK   Needs within reach.        Trevon Avitia, [address] [facility] [place], [facility]- [address] [place] [address]  Pg # [phone]  This document will serve as a discharge summary if pt discharge before next treatment   session

## 2019-05-10 PROBLEM — E43 SEVERE MALNUTRITION (HCC): Chronic | Status: ACTIVE | Noted: 2019-01-01

## 2019-05-10 NOTE — CARE COORDINATION
Case Management Daily Note:    Current Plan of Care: prep for colonoscopy on 5/11      PT AM-PAC: 17/24  Per last eval on: 05/06/2019    OT AM-PAC: 18 /24  Per last eval on: 05/06/2019    DME needs: deferred      Discharge Plan: SNF at [address] [place] [address]    Tentative Discharge Date: 05/12/2019    Current Barriers to Discharge: pending colonoscopy    Resources/Information given: none at this time      Case Management Notes: CM continues to follow for discharge planning, patient scheduled for colonoscopy on 5/11 with GI. Patient starting prep this afternoon. Patient has been accepted to [address] [place] [address] for SNF admission. They are able to accept once patient medically ready for discharge. CM has completed [facility] online (Doc ID #: [id number]) and family to transport at discharge. Report: [phone]  Fax: [phone]    Weekend CM to notify Zaid Wilson in admissions at [phone] in regards to discharge plan and time, if patient ready to discharge over the weekend.     Thank you,  Ria Krishnan RN,   4th Floor Progressive Care Unit  [phone]

## 2019-05-10 NOTE — PROGRESS NOTES
Resident Progress Note      Admit Date: 2019    PCP: Low Torres MD                  : 1925  MRN: [id number]    Chief Complaint:  Dizziness, Anemia    Subjective:   Seen and examined at bedside  No acute events overnight, no apparent distress  Hb is stable at 9.4, given 1x dose of lasix yesterday for pulmonary edema  Na back up to 148, poor appetite  Bowel movement yesterday, dark and tarry  Denied chest pain, SOB, palpitations, abdominal pain  Will go for colonoscopy tomorrow morning, NPO after midnight  Going down for MBS right now    Diet: DIET CLEAR LIQUID; Low Sodium (2 GM)  Diet NPO, After Midnight      Data:   Scheduled Meds:   pantoprazole  40 mg Oral QAM AC    allopurinol  100 mg Oral Daily    atorvastatin  40 mg Oral QAM    docusate sodium  100 mg Oral Daily    escitalopram  5 mg Oral Daily    fenofibrate  54 mg Oral Daily    levothyroxine  100 mcg Oral Daily    therapeutic multivitamin-minerals  1 tablet Oral Daily    vitamin D  1,000 Units Oral Daily    sodium chloride flush  10 mL Intravenous 2 times per day    diltiazem  120 mg Oral Daily    metoprolol succinate  100 mg Oral Daily     Continuous Infusions:  PRN Meds:acetaminophen, ALPRAZolam, fluticasone, melatonin, polyethylene glycol, sodium chloride flush, magnesium hydroxide, ondansetron  I/O last 3 completed shifts: In: 680 [P.O.:680]  Out: 325 [Urine:325]  No intake/output data recorded.     Intake/Output Summary (Last 24 hours) at 5/10/2019 0830  Last data filed at 5/10/2019 0400  Gross per 24 hour   Intake 680 ml   Output 325 ml   Net 355 ml           Objective:     Vitals: BP (!) 150/40   Pulse 65   Temp 97.2 °F (36.2 °C) (Oral)   Resp 20   Ht 5' 8\" (1.727 m)   Wt 121 lb 0.5 oz (54.9 kg)   SpO2 96%   BMI 18.40 kg/m²     Physical Exam  General appearance: Resting comfortably, no apparent distress  HEENT EOMI, PERRL, no LAD, no oropharyngeal erythema, MMM  Lungs: Minimal crackles bibasilar, no wheezing, good inspiratory effort  Heart: RRR +S1/S2 without murmurs, rubs or gallops  Abdomen: Soft, NTND, without rigidity or guarding, normal active bowel sounds  Extremities: No peripheral edema, good peripheral pulses  Skin: No skin rashes  Neurologic: AAOx4, CNs II-XII grossly intact, moving all extremities spontaneously       LABS:    CBC:   Recent Labs     05/09/19  0536 05/09/19  1936 05/10/19  0528   WBC 6.0 7.2 6.1   HGB 9.4* 9.8* 9.7*   HCT 29.1* 30.5* 29.6*   MCV 93.5 94.3 93.2    156 156                                                                BMP:    Recent Labs     05/09/19  0536 05/09/19  1550 05/10/19  0528   * 144 148*   K 5.4* 4.8 4.2   * 111* 113*   CO2 24 24 23   BUN 73* 67* 70*   CREATININE 1.9* 2.2* 2.1*   GLUCOSE 107* 116* 102*       -----------------------------------------------------------------  RAD:   XR CHEST PORTABLE   Final Result   1. Worsening bilateral effusions and lower lung airspace disease suggesting pulmonary edema. XR CHEST STANDARD (2 VW)   Final Result   Impression:   1. Right pleural effusion and right lung consolidation decreased from 4/15/2019 chest x-ray. Assessment/Plan:     # Acute Blood Loss Anemia Likely 2/2 GI Bleed (Improving)  Underwent EGD (5/8/19) and hiatal hernia discovered, no active bleeding. Will undergo Colonoscopy (5/11/19) after 5 days of Plavix washout. Transfused 1 unit PRBC 5/7/19 for Hb 7.1.   Had to be transfused again at (5/8/19) for Hb of 7.5   - GI consulted  - Colonoscopy 5/11/19  - 2 large peripheral bores  - Venofer 100 mg for 2 more doses  - Holding ASA/Plavix/Heparin  - SCD boots  - Protonix 40 mg PO qD  - Transfuse  Hb < 8 in setting of Hx of CABG  - H/H every 12 hours     # Hypernatremia  - Discontinued IV fluids yesterday due to worsening pulmonary edema  - Encouraged increase in oral intake     # Dizziness 2/2 GI Bleed (Improving)  - Holding Lasix for now in setting of hypernatremia     # Fe Deficiency Anemia  Fe 23, Iron Sat 6, Positive FOBT  - Venofer x 2 doses today and tomorrow    # Choking on Water and Pills  - MBS today     # CAD s/p CABG and AICD placement  - Holding home ASA and Plavix  - Restarted Lasix 40 mg PO once daily     # Afib (NSR)  - Diltiazem      # HFrEF   4/12/19 EF 45-50%  - Metoprolol with holding parameters  - Lasix 40 mg PO once daily     # Gout   - Allopurinol      # Hypothyroidism  - Levothyroxine 100 mcg     # HLD  - Fenofibrate  - Lipitor     # Constipation  - Colace  - Miralax PRN     # Anxiety  - Lexapro  - Xanax PRN     FEN: Cardiac Low Na < 2 grams  Dietary Nutrition Supplements: Standard High Calorie Oral Supplement  PPx:  SCD  CODE: Limited No to everything  Dispo: [facility]  PT/OT: consulted     Billie Rogers MD  5/10/2019  8:30 AM  Pager:  [phone]    Addendum to Resident H& P/Progress note:  I have personally seen,examined and evaluated the patient. I have reviewed the current history, physical findings, labs and assessment and plan and agree with note as documented by resident MD [facility])  The patient underwent modified barium swallowing testing; silent aspiration with thin liquids was detected. The patient will have colonoscopy tomorrow, May 11, 2019. The patient has been accepted to [facility]. Hopefully, will be ready for discharge to skilled nursing facility in couple of days.   + CKD stage 4    Gris Beck MD, [address]

## 2019-05-10 NOTE — PROGRESS NOTES
Colonoscopy: Patient drank 2 glasses of Nectar Thickened Golytely and then declined to finish. Dr. Angelita Mesa notified. Offered to insert NG to complete prep, as suggested by Dr. Angelita Mesa, but patient declined. Offered to do tap water enemas, as suggested by Dr. Angelita Mesa but patient continued to decline. Dr. Angelita Mesa and Dr. Mal Miles notified of he same.

## 2019-05-10 NOTE — DISCHARGE SUMMARY
Hospital Medicine Discharge Summary    Patient: Elsy Zurita     Gender: male  : 1925   Age: 80 y.o. MRN: [id number]    Code Status: Limited     Primary Care Provider: Yonatan Gilliland MD    Admit Date: 2019   Discharge Date:       Admitting Physician: Dori Nolen MD  Discharge Physician: Harley Rangel     Discharge Diagnoses: Active Hospital Problems    Diagnosis Date Noted    Severe malnutrition ([facility] [E43] 05/10/2019    Oropharyngeal dysphagia [R13.12]     Hypernatremia [E87.0]     Dyspnea on exertion [R06.09] 2019    Volume depletion [E86.9]     Chronic systolic heart failure (HCC) [I50.22]     Dizziness [R42] 2011       HPI on Admission  Patient is a 80 y.o. male with a history of CHF (last echo showed EF of 45% with septal and apical wall hypokinesis), CAD s/p CABG and defibrillator placement, HTN, HLD, hypothyroidis, afib on diltiazem, CKD presenting here with BATEMAN. Pt also endorses for the last 2 weeks he has had dizziness with transitioning from sitting to standing.      Per pt, he has felt over the last few weeks worsening dyspnea on exertion (will get short of breath just talking or trying to get out of a chair). Occasionally falls, however has not recently hit his head. Eats well, however pt endorses that he has been urinating quite frequently. No fevers, no chills, no dysuria. No melena observed in his stool. Feels his SOB improves with lying flat and worsens with movement. No orthopnea. No PND. Does not use oxygen at home. No nausea, +intermittent episodes of emesis, however not recently. No diarrhea, +constipation. No leg swelling. Hospital Course:     Acute Blood Loss Anemia Likely 2/2 GI Bleed (Improving)  Underwent EGD (19) and hiatal hernia discovered, no active bleeding.  Will undergo Colonoscopy (19) after 5 days of Plavix washout.  Transfused 1 unit PRBC 19 for Hb 7.1.  Had to be transfused again at (19) for Hb of 7.5.   Was given 4 doses of Venofer. Held ASA/Plavix     Hypernatremia (Improving)  - Discontinued IV fluids yesterday due to worsening pulmonary edema  - Encouraged increase in oral intake     Dizziness 2/2 GI Bleed (Improving)  - Holding Lasix for now in setting of hypernatremia     Fe Deficiency Anemia  Fe 23, Iron Sat 6, Positive FOBT  - Venofer x 2 doses today and tomorrow     Choking on Water and Pills  - MBS today     CAD s/p CABG and AICD placement  - Holding home ASA and Plavix  - Restarted Lasix 40 mg PO once daily      Afib (NSR)  - Diltiazem      HFrEF   4/12/19 EF 45-50%  - Metoprolol with holding parameters  - Lasix 40 mg PO once daily     Gout   - Allopurinol      Hypothyroidism  - Levothyroxine 100 mcg     HLD  - Fenofibrate  - Lipitor     Constipation  - Colace  - Miralax PRN     # Anxiety  - Lexapro  - Xanax PRN        Disposition:  {DISPOSITIONS:565351113}    Exam:     BP (!) 176/65   Pulse 60   Temp 97.6 °F (36.4 °C) (Oral)   Resp 18   Ht 5' 8\" (1.727 m)   Wt 121 lb 0.5 oz (54.9 kg)   SpO2 96%   BMI 18.40 kg/m²     General appearance:  No apparent distress, appears stated age and cooperative. HEENT:  Normal cephalic, atraumatic without obvious deformity. Pupils equal, round, and reactive to light. Extra ocular muscles intact. Conjunctivae/corneas clear. Neck: Supple, with full range of motion. No jugular venous distention. Trachea midline. Respiratory:  Normal respiratory effort. Clear to auscultation, bilaterally without Rales/Wheezes/Rhonchi. Cardiovascular:  Regular rate and rhythm with normal S1/S2 without murmurs, rubs or gallops. Abdomen: Soft, non-tender, non-distended with normal bowel sounds. Musculoskeletal:  No clubbing, cyanosis or edema bilaterally. Full range of motion without deformity. Skin: Skin color, texture, turgor normal.  No rashes or lesions. Neurologic:  Neurovascularly intact without any focal sensory/motor deficits.  Cranial nerves: II-XII intact, grossly non-focal.  Psychiatric: Alert and oriented, thought content appropriate, normal insight      Consults:     IP CONSULT TO PRIMARY CARE PROVIDER  IP CONSULT TO INTERNAL MEDICINE  IP CONSULT TO GI  IP CONSULT TO SOCIAL WORK    Labs: For convenience and continuity at follow-up the following most recent labs are provided:    Lab Results   Component Value Date    WBC 6.1 05/10/2019    HGB 9.7 05/10/2019    HCT 29.6 05/10/2019    MCV 93.2 05/10/2019     05/10/2019     05/10/2019    K 4.2 05/10/2019     05/10/2019    CO2 23 05/10/2019    BUN 70 05/10/2019    CREATININE 2.1 05/10/2019    CALCIUM 8.9 05/10/2019    PHOS 2.4 05/09/2019    BNP 1,395.0 03/05/2019    ALKPHOS 45 05/05/2019    ALT 8 05/05/2019    AST 19 05/05/2019    BILITOT 0.4 05/05/2019    BILIDIR 0.0 06/20/2011    LABALBU 3.1 05/09/2019    LDLCALC 73 02/14/2019    TRIG 80 02/14/2019     Lab Results   Component Value Date    INR 1.12 05/05/2019    INR 1.18 (H) 04/12/2019    INR 1.14 03/05/2019       Radiology:  FL MODIFIED BARIUM SWALLOW W VIDEO   Final Result      1. Silent aspiration with thin liquid barium. XR CHEST PORTABLE   Final Result   1. Worsening bilateral effusions and lower lung airspace disease suggesting pulmonary edema. XR CHEST STANDARD (2 VW)   Final Result   Impression:   1. Right pleural effusion and right lung consolidation decreased from 4/15/2019 chest x-ray. EKG     Rhythm: {CARDIAC MONITOR STRIP RHYTHM:20107}  Rate: {EKG RATE:20108}  Clinical Impression: {EKG CLINICAL IMPRESSION:20115}    Discharge Medications:   Current Discharge Medication List        Current Discharge Medication List        Current Discharge Medication List      CONTINUE these medications which have NOT CHANGED    Details   ALPRAZolam (XANAX) 0.25 MG tablet Take 1 tablet by mouth daily as needed for Sleep for up to 60 days.   Qty: 30 tablet, Refills: 1    Associated Diagnoses: Anxiety      furosemide (LASIX) 80 MG tablet Take 0.5 tablets by mouth 2 times daily  Qty: 60 tablet, Refills: 0      escitalopram (LEXAPRO) 5 MG tablet TAKE 1 TABLET BY MOUTH DAILY TO HELP ANXIETY  Qty: 90 tablet, Refills: 0      fenofibrate (TRICOR) 54 MG tablet TAKE 1 TABLET BY MOUTH EVERY DAY  Qty: 90 tablet, Refills: 1    Comments: **Patient requests 90 days supply**      ASPIRIN LOW DOSE 81 MG EC tablet TAKE 1 TABLET BY MOUTH DAILY  Qty: 90 tablet, Refills: 1      vitamin D (CHOLECALCIFEROL) 1000 UNIT TABS tablet Take 1,000 Units by mouth daily      clopidogrel (PLAVIX) 75 MG tablet TAKE 1 TABLET BY MOUTH DAILY  Qty: 90 tablet, Refills: 3      allopurinol (ZYLOPRIM) 100 MG tablet TAKE 1 TABLET BY MOUTH DAILY  Qty: 90 tablet, Refills: 2      Multiple Vitamins-Minerals (CENTRUM SILVER) TABS TAKE 1 TABLET BY MOUTH DAILY  Qty: 90 tablet, Refills: 2      levothyroxine (SYNTHROID) 100 MCG tablet TAKE 1 TABLET BY MOUTH DAILY  Qty: 90 tablet, Refills: 2      atorvastatin (LIPITOR) 40 MG tablet TAKE 1 TABLET BY MOUTH EVERY MORNING  Qty: 90 tablet, Refills: 2      metoprolol succinate (TOPROL XL) 100 MG extended release tablet Take 1 tablet by mouth daily  Qty: 90 tablet, Refills: 3      diltiazem (CARDIZEM CD) 120 MG extended release capsule TAKE 1 CAP BY MOUTH DAILY (DX: _________)  Shavon Kasal: 30 capsule, Refills: 10      famotidine (PEPCID) 20 MG tablet TAKE 1 TABLET BY MOUTH DAILY AS NEEDED FOR REFLUX  Qty: 90 tablet, Refills: 3    Comments: **Patient requests 90 days supply**      Acetaminophen (APAP) 325 MG TABS Take 650 mg by mouth every 6 hours as needed for Pain or Fever  Qty: 100 tablet, Refills: 3      Melatonin 5 MG CAPS TAKE 1 CAPSULE BY MOUTH NIGHTLY AS NEEDED FOR SLEEP  Qty: 90 capsule, Refills: 3      fluticasone (FLONASE) 50 MCG/ACT nasal spray USE 2SPRAYS BY NASAL ROUTE DAILY  Qty: 3 Bottle, Refills: 3    Comments: **Patient requests 90 days supply**      docusate sodium (COLACE, DULCOLAX) 100 MG CAPS Take 100 mg by mouth daily  Qty: 30 capsule, Refills: 1      polyethylene glycol [facility]) packet Take 17 g by mouth daily as needed for Constipation  Qty: 527 g, Refills: 1           Current Discharge Medication List          Follow-up appointments:  {Time; 1 week to 1 month:99075}    Provider Follow-up:    ***    Condition at Discharge:  {Responses; Excellent - Poor:724739225}    The patient was seen and examined on day of discharge and this discharge summary is in conjunction with any daily progress note from day of discharge. Time spent on discharge is more than {Time; 15 min - 8 hours:00552} in the examination, evaluation, counseling and review of medications and discharge plan. Signed:    Erlinda Burroughs   5/10/2019      Thank you Roger Lowry MD for the opportunity to be involved in this patient's care.  If you have any questions or concerns please feel free to contact me at ***

## 2019-05-10 NOTE — DISCHARGE INSTR - COC
Conjugate Lorna Koehler) 07/29/2015, 12/22/2015    Pneumococcal Polysaccharide ([id number]) 07/12/2012    Td, unspecified formulation 07/12/2012    Tdap (Boostrix, Adacel) 05/01/2018    Zoster Subunit (Shingrix) 05/01/2018       Active Problems:  Patient Active Problem List   Diagnosis Code    Hypertrophy of prostate without urinary obstruction and other lower urinary tract symptoms (LUTS) N40.0    Disorder of kidney and ureter N28.9    Gout M10.9    Acquired hypothyroidism E03.9    GERD (gastroesophageal reflux disease) K21.9    Coronary atherosclerosis I25.10    Light headed R42    Dizziness R42    Carotid artery stenosis I65.29    Automatic implantable cardioverter-defibrillator in situ Z95.810    Paroxysmal ventricular tachycardia (HCC) I47.2    CKD (chronic kidney disease) stage 4, GFR 15-29 ml/min (HCC) N18.4    Hyperlipidemia E78.5    Essential hypertension I10    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.4    Foot trauma S99.929A    Anxiety F41.9    Diarrhea R19.7    Atrial tachycardia (HCC) I47.1    Chronic atrial fibrillation (HCC) I48.2    MGUS (monoclonal gammopathy of unknown significance) D47.2    Iron deficiency anemia D50.9    Acute on chronic diastolic heart failure (HCC) I50.33    Non-sustained ventricular tachycardia (HCC) I47.2    BATEMAN (dyspnea on exertion) R06.09    Chronic systolic heart failure (HCC) I50.22    Pleural effusion transudative J94.8    Paroxysmal atrial fibrillation (HCC) I48.0    Hypokalemia E87.6    Pneumonia J18.9    Hypertensive urgency I16.0    Pleural effusion on right J90    Dyspnea on exertion R06.09    Volume depletion E86.9    Oropharyngeal dysphagia R13.12    Hypernatremia E87.0    Severe malnutrition (HCC) E43       Isolation/Infection:   Isolation          No Isolation            Nurse Assessment:  Last Vital Signs: BP (!) 148/69   Pulse 65   Temp 97.6 °F (36.4 °C) (Oral)   Resp 20   Ht 5' 8\" (1.727 m)   Wt 121 lb Risk of Unplanned Readmission:        29           Discharging to Facility/ Agency   · Name: Lashaun Porter  · Address: [place] [address].  · Phone: [phone]  · Fax: [phone]    Dialysis Facility (if applicable)   · Name:  · Address:  · Dialysis Schedule:  · Phone:  · Fax:    / signature: Electronically signed by Shara Delgado RN on 5/10/19 at 4:09 PM    PHYSICIAN SECTION    Prognosis: Fair    Condition at Discharge: Stable    Rehab Potential (if transferring to Rehab): Fair    Recommended Labs or Other Treatments After Discharge:     CBC daily x 3 days, Please call [phone] (Dr. Travis Castillo with results of CBC)  PT/OT Daily  Fall Precaution  Full Liquid Diet x 2 days on discharge  Then Continue with Low Residue Diet x 2 weeks  Discharge on Lasix 60 mg PO BID  Follow up with PCP within 1-2 weeks of discharge      Physician Certification: I certify the above information and transfer of Solange Sandoval  is necessary for the continuing treatment of the diagnosis listed and that he requires [facility] for less 30 days.      Update Admission H&P: No change in H&P    PHYSICIAN SIGNATURE:  Electronically signed by Sudheer Myers M.D and Dr. Altaf Youssef on 5/14/19 at 12:34 PM

## 2019-05-10 NOTE — PROCEDURES
INSTRUMENTAL SWALLOW REPORT  MODIFIED BARIUM SWALLOW    NAME: Lilliana Bustillos   : 1925  MRN: [id number]       Date of Eval: 5/10/2019     Ordering Physician: Dr. Nida Ware        Referring Diagnosis(es):      Past Medical History:  has a past medical history of Anemia, Anxiety, Atrial fibrillation ([facility] [address] CAD (coronary artery disease), CHF (congestive heart failure) ([facility] [address] Chronic renal insufficiency, Gout, History of BPH, Hyperlipidemia, Hypertension, and Hypothyroidism. Past Surgical History:  has a past surgical history that includes Pacemaker insertion (); Coronary artery bypass graft (); shoulder surgery (Right); Colon surgery (); Cardiac defibrillator placement; and Upper gastrointestinal endoscopy (N/A, 2019). Current Diet Solid Consistency: Clear Liquids  Current Diet Liquid Consistency: Clear Liquids  Type of Study: Initial MBS     Recent CXR: 19  Impression   1. Worsening bilateral effusions and lower lung airspace disease suggesting pulmonary edema. Patient Complaints/Reason for Referral:  Lilliana Bustillos was referred for a MBS to assess the efficiency of his/her swallow function, assess for aspiration, and to make recommendations regarding safe dietary consistencies, effective compensatory strategies, and safe eating environment. Patient complaints: Occasional choking. Onset of problem:   Date of Onset: 19  General Comment  Comments: The patient admits from home ([place] [address]) where he resides with spouse per chart review. Pt admitted with worsening dypnea. BSE prompted yesterday after patient had choking/aspiration event when taking medications. Behavior/Cognition/Vision/Hearing:  Behavior/Cognition: Alert; Cooperative;Pleasant mood  Vision: Within Functional Limits  Hearing: Exceptions to [facility]  Hearing Exceptions: Hard of hearing/hearing concerns;Bilateral hearing aid  Patient Position: Lateral and Patient Degrees: 90* upright  Consistencies Administered: Nectar cup;Thin cup; Thin teaspoon; Thin straw;Puree; Soft solid    Impressions:  Oral Phase: Pt demonstrates head tilt with unclear etiology but would suspect this is related to some oral weakness and at this point appears habitual. The patient was able to propel all other bolus posteriorly with cue to maintain neutral head positioning. Pharyngeal: Patient demonstrated chantelle min-moderate SILENT aspiration of thins via tsp, cup and straw with and without chin tuck. The chin tuck was effective in minimizing aspiration but the patient had SILENT aspiration after the swallow on remaining residue in the pharynx. The patient presents with reduced tongue base retraction, reduced pharyngeal peristalsis/stripping wave and reduced hyolaryngeal excursion which contribute to poor airway protection and residue after the swallow. The patient demonstrated increased residue with thins and nectars vs pudding and solids. It should be noted that the patient demonstrated dry throat clearing prior to the study, SILENT aspiration during the study and did demonstrate overtly wet vocal quality after the study along with belching. Compensatory Swallowing Strategies Attempted: Chin tuck  Dysphagia Outcome Severity Scale: Level 5: Mild dysphagia- Distant supervision. May need one diet consistency restricted  Penetration-Aspiration Scale (PAS): 8 - Material Enters the airway, passes below the vocal folds, and no effort is made to eject    Recommended Diet:  Solid consistency: Regular  Liquid consistency: Nectar(vs. Thin - pending pt/family and MD decision)  Liquid administration via: Cup;Straw  Medication administration: Meds in puree    Safe Swallow Protocol:  Supervision: Close  Compensatory Swallowing Strategies: Upright as possible for all oral intake;Remain upright for 30-45 minutes after meals; Other (comments)    Recommendations/Treatment  Requires SLP Intervention: Yes  Recommendations: F/U MBS  D/C Recommendations: Home ST(pending pt Bolus Expelled: Thin straw; Thin cup; Thin teaspoon(cued volitional cough)  Pharyngeal Residue - Valleculae: Thin cup; Thin straw;Nectar cup;Nectar straw  Pharyngeal Residue - Pyriform: Thin straw; Thin cup;Nectar cup;Nectar straw  Pharyngeal Residue - Posterior Pharynx: Nectar cup    Esophageal Phase  Esophageal Screen: WFL    Pain   Patient Currently in Pain: No  Pain Level: 0    Therapy Time:   Individual Concurrent Group Co-treatment   Time In 1130 0000 0000 0000   Time Out 1150 0000 0000 0000   Minutes 20 0 0 0   Variance: 0  Timed Code Treatment Minutes: 0 Minutes  Total Treatment Time: 20    Gil De Los Santos M.A., Darcie [id number]  Speech-Language Pathologist

## 2019-05-10 NOTE — PLAN OF CARE
Nutrition Problem: Severe malnutrition  Intervention: Food and/or Nutrient Delivery: Continue current diet, Start ONS  Nutritional Goals: Pt will consume at least 50% of meals/supplements offered and tolerate diet advancement per ST

## 2019-05-10 NOTE — PROGRESS NOTES
Nutrition Assessment    Type and Reason for Visit: Initial    Nutrition Recommendations:   1. PO Diet: Continue current clear liquid low sodium diet, least restrictive safest texture per Speech Therapy (nectar vs thin liquids pending pt/family and MD decision)  2. ONS: Offer Ensure Clear once daily  3. Monitor weight, labs (lytes) and clinical progress  4. Monitor, record and encourage adequate PO intake at all meals through admission. 5. Consume small/frequent meals and snacks to build appetite      Nutrition Assessment: RD following for LOS. Pt is severely malnourished on admit AEB severe loss of subcutaneous fat and muscle mass with low BMI, significant wt loss x 3 months and poor po intake for greater than 5 days. Pt is at risk for further compromise d/t anemia likely 2/2 GI bleed. Plan for colonoscopy tomorrow. Will monitor ability to advance diet as tolerated per SLP recs, start clear ONS, monitor intake adequacy and nutrition status. Malnutrition Assessment:  · Malnutrition Status: Meets the criteria for severe malnutrition  · Context: Chronic illness  · Findings of the 6 clinical characteristics of malnutrition (Minimum of 2 out of 6 clinical characteristics is required to make the diagnosis of moderate or severe Protein Calorie Malnutrition based on AND/ASPEN Guidelines):  1. Energy Intake-Less than or equal to 75% of estimated energy requirement, Greater than or equal to 5 days    2. Weight Loss-10% loss or greater, in 3 months  3. Fat Loss-Severe subcutaneous fat loss, Orbital, Triceps  4. Muscle Loss-Severe muscle mass loss, Clavicles (pectoralis and deltoids), Calf (gastrocnemius), Temples (temporalis muscle), Thigh (quadriceps)  5. Fluid Accumulation-No significant fluid accumulation,    6.   Strength-Not measured    Nutrition Risk Level: High    Nutrient Needs:  · Estimated Daily Total Kcal: 9035-8474 (30-35)  · Estimated Daily Protein (g): 81-91 (1.5-1.7)  · Estimated Daily Total Fluid (ml/day): 1309-0005 (1 ml/kcal)    Nutrition Diagnosis:   · Problem: Severe malnutrition  · Etiology: related to Insufficient energy/nutrient consumption     Signs and symptoms:  as evidenced by BMI, Severe muscle loss, Severe loss of subcutaneous fat, Weight loss greater than or equal to 7.5% in 3 months, Diet history of poor intake    Objective Information:  · Nutrition-Focused Physical Findings: appears thin; [place]; no edema; on diuretic therapy; last black/red BM x 1 on 5/10  · Wound Type: None  · Current Nutrition Therapies:  · Oral Diet Orders: 2gm Sodium, Clear Liquid   · Oral Diet intake: 1-25%, 26-50%, 51-75%, %  · Oral Nutrition Supplement (ONS) Orders: None  · Anthropometric Measures:  · Ht: 5' 8\" (172.7 cm)   · Current Body Wt: 121 lb (54.9 kg)  · Admission Body Wt: 118 lb (53.5 kg)  · Usual Body Wt: (119-136 lb per EMR and 122-123 lb per spouse)  · % Weight Change:  ,  10.4% loss in 3 months  · Ideal Body Wt: 154 lb (69.9 kg),    · BMI Classification: BMI <18.5 Underweight    Nutrition Interventions:   Continue current diet, Start ONS  Continued Inpatient Monitoring    Nutrition Evaluation:   · Evaluation: Goals set   · Goals: Pt will consume at least 50% of meals/supplements offered and tolerate diet advancement per ST    · Monitoring: Nutrition Progression, Meal Intake, Supplement Intake, I&O, Diet Tolerance, Weight, Pertinent Labs, Chewing/Swallowing, Monitor Bowel Function      Electronically signed by Tanya Lawler RD, LD on 5/10/19 at 1:55 PM    Contact Number: [phone]

## 2019-05-10 NOTE — PROGRESS NOTES
GI Progress Note    Solange Sandoval is a 80 y.o. male patient. 1. Chronic congestive heart failure, unspecified heart failure type ([address]    2. Dyspnea on exertion    3. GI bleed    Admit Date: 2019    Subjective:       Colonoscopy planned in am.   Patient refuses the prep for the colonoscopy          Scheduled Meds:   iron sucrose  100 mg Intravenous Daily    furosemide  40 mg Oral Daily    pantoprazole  40 mg Oral QAM AC    allopurinol  100 mg Oral Daily    atorvastatin  40 mg Oral QAM    docusate sodium  100 mg Oral Daily    escitalopram  5 mg Oral Daily    fenofibrate  54 mg Oral Daily    levothyroxine  100 mcg Oral Daily    therapeutic multivitamin-minerals  1 tablet Oral Daily    vitamin D  1,000 Units Oral Daily    sodium chloride flush  10 mL Intravenous 2 times per day    diltiazem  120 mg Oral Daily    metoprolol succinate  100 mg Oral Daily         PRN Meds:  acetaminophen, ALPRAZolam, fluticasone, melatonin, polyethylene glycol, sodium chloride flush, magnesium hydroxide, ondansetron      Objective:       Patient Vitals for the past 24 hrs:   BP Temp Temp src Pulse Resp SpO2 Height Weight   05/10/19 1726 (!) 176/65 -- -- 60 18 96 % -- --   05/10/19 1341 -- -- -- -- -- -- 5' 8\" (1.727 m) --   05/10/19 1330 (!) 148/69 97.6 °F (36.4 °C) Oral 65 20 97 % -- --   05/10/19 0925 (!) 151/57 97 °F (36.1 °C) Oral 66 18 98 % -- --   05/10/19 0610 (!) 150/40 -- -- -- -- -- -- --   05/10/19 0400 (!) 170/59 97.2 °F (36.2 °C) Oral 65 20 96 % -- 121 lb 0.5 oz (54.9 kg)   19 2347 (!) 147/59 98 °F (36.7 °C) Oral 64 18 96 % -- --   19 1949 (!) 163/58 97.4 °F (36.3 °C) Oral 56 18 98 % -- --       Exam:    VITALS:  BP (!) 176/65   Pulse 60   Temp 97.6 °F (36.4 °C) (Oral)   Resp 18   Ht 5' 8\" (1.727 m)   Wt 121 lb 0.5 oz (54.9 kg)   SpO2 96%   BMI 18.40 kg/m²   TEMPERATURE:  Current - Temp: 97.6 °F (36.4 °C);  Max - Temp  Av.4 °F (36.3 °C)  Min: 97 °F (36.1 °C)  Max: 98 °F (36.7

## 2019-05-10 NOTE — PLAN OF CARE
Alert and oriented. Able to follow commands. Has remained free of injury. In bed with alarm activated and call light in reach. Will monitor.

## 2019-05-10 NOTE — PROGRESS NOTES
Speech Language Pathology  Facility/Department: [place] [address] PCU  Dysphagia Daily Treatment Note    NAME: Robin Vargas  : 1925  MRN: [id number]    Patient Diagnosis(es):   Patient Active Problem List    Diagnosis Date Noted    Essential hypertension 2016     Priority: Medium    Automatic implantable cardioverter-defibrillator in situ 10/03/2011     Priority: Medium    Paroxysmal ventricular tachycardia ([address] 10/03/2011     Priority: Medium    Carotid artery stenosis 2011     Priority: Medium    Hypertrophy of prostate without urinary obstruction and other lower urinary tract symptoms (LUTS) 2010     Priority: Medium    Disorder of kidney and ureter 2010     Priority: Medium    Coronary atherosclerosis 2010     Priority: Medium    Light headed 2011     Priority: Low    Gout 2010     Priority: Low    Acquired hypothyroidism 2010     Priority: Low    GERD (gastroesophageal reflux disease) 2010     Priority: Low    Oropharyngeal dysphagia     Hypernatremia     Dyspnea on exertion 2019    Volume depletion     Pleural effusion on right 2019    Pneumonia 2019    Hypertensive urgency     Hypokalemia     Paroxysmal atrial fibrillation (HCC)     BATEMAN (dyspnea on exertion) 2017    Chronic systolic heart failure (HCC)     Pleural effusion transudative     Non-sustained ventricular tachycardia (HCC)     Acute on chronic diastolic heart failure (HCC)     MGUS (monoclonal gammopathy of unknown significance) 2016    Iron deficiency anemia 2016    Atrial tachycardia (HCC)     Chronic atrial fibrillation ([address]     Diarrhea 2016    Anxiety 2016    Foot trauma 03/15/2016    Hypothyroidism due to acquired atrophy of thyroid 2016    CKD (chronic kidney disease) stage 4, GFR 15-29 ml/min ([address] 10/13/2015    Hyperlipidemia 10/13/2015    Dizziness 2011     Allergies:    Allergies   Allergen 5/10 - MBS this date.      2- The pt/family will demonstrate understanding of swallowing recommendations and concerns. 5/9-  The pt and family were educated to purpose of the visit, anatomy and physiology of the swallow, rational for keeping head at midline vs hyperextending back when swallowing, concerns for aspiration, swallowing strategies, diet recommendations and plan to perform MBS when radiology's schedule allows. The pt stated comprehension. con't goal  5/10 - No family present. Educated on importance of oral care in preventing aspiration pneumonia, rationale for MBS, procedure to take place this date. Educated on keeping head at midline during swallow. Continue goal.      Patient/Family/Caregiver Education:  No family present. Education as above. Compensatory Strategies:  Upright as possible for all oral intake;Remain upright for 30-45 minutes after meals; Other (comments)(keep head at midline when swallowing)    Plan:  MBS this date  Diet recommendations: Continue current diet pending MBS results  DC recommendation: Pending MBS results/recommendations. Treatment: 10 minutes  D/W nursing, Kyree Robbins and MD  Needs met prior to leaving room, call button in reach. Didier Irving M.A., [address] [facility] [phone]  Speech-Language Pathologist  Pg.  # [phone]    If patient is discharged prior to next treatment, this note will serve as the discharge summary

## 2019-05-10 NOTE — PROGRESS NOTES
Speech Language Pathology - attempt    Attempted to see patient/spouse for education re: MBS results but patient's spouse not present at this time. Will re-attempt at a later time as able.      Nessa Canales M.A., [facility]  Speech-Language Pathologist  Pager [phone]

## 2019-05-11 NOTE — PLAN OF CARE
Problem: Falls - Risk of:  Goal: Will remain free from falls  Description  Will remain free from falls  5/11/2019 1608 by Sarina Lawson RN  Outcome: Ongoing  Note:   Patient resting quietly in bed, in low position, wheels locked, 3/4 side rails up, alarm armed and call light within reach. Non-skid foot wear in place. Will continue to monitor     Problem: Risk for Impaired Skin Integrity  Goal: Tissue integrity - skin and mucous membranes  Description  Structural intactness and normal physiological function of skin and  mucous membranes. Outcome: Ongoing  Note:   Patient able to reposition independently however continues to also be turned/repositioned q 2 hours and prn. All skin remains dry and intact. Barrier wipes applies with incontinent check/change.  Will continue to monitor

## 2019-05-11 NOTE — PROGRESS NOTES
Internal Medicine Progress Note  5/11/2019 3:59 PM    Subjective:   Admit Date: 5/5/2019  PCP: Marianne Solitario MD  Interval History:    Pt seen and examined at bedside. Did not tolerate prep overnight and was initially refusing colonoscopy, although later son convinced patient to drink prep for possible colonoscopy tomorrow. Sodium 149 today. Hemoglobin 9.2 from 9.7 yesterday. Reportedly had some blood in stool overnight but hemoglobin but nothing active currently. Objective:   Vitals: BP (!) 168/63   Pulse 59   Temp 97.5 °F (36.4 °C) (Oral)   Resp 18   Ht 5' 8\" (1.727 m)   Wt 121 lb 4.1 oz (55 kg) Comment: refusing standing scale  SpO2 96%   BMI 18.44 kg/m²     General: Patient Appears comfortable, no acute distress  HEENT: PERRL; EOMI; moist mucous membranes  Heart: RRR; Normal S1, S2; diminished bilaterally in bases, some mild crackles  Lungs: CTAB; no wheezing or crackles; normal respiratory effort  Abdomen: Soft; non-tender; non-distended. MSK: No lower extremity edema  Vascular: Radial pulses 2+ and symmetric  Neuro: No focal deficits; sensation grossly normal  Skin: Dry, warm    Data:   Scheduled Meds:   furosemide  60 mg Oral BID    pantoprazole  40 mg Oral QAM AC    allopurinol  100 mg Oral Daily    atorvastatin  40 mg Oral QAM    docusate sodium  100 mg Oral Daily    escitalopram  5 mg Oral Daily    fenofibrate  54 mg Oral Daily    levothyroxine  100 mcg Oral Daily    therapeutic multivitamin-minerals  1 tablet Oral Daily    vitamin D  1,000 Units Oral Daily    sodium chloride flush  10 mL Intravenous 2 times per day    diltiazem  120 mg Oral Daily    metoprolol succinate  100 mg Oral Daily     Continuous Infusions:   sodium chloride 50 mL/hr at 05/11/19 0830     PRN Meds:acetaminophen, ALPRAZolam, fluticasone, melatonin, polyethylene glycol, sodium chloride flush, magnesium hydroxide, ondansetron  I/O last 3 completed shifts:   In: 386.4 [P.O.:75; I.V.:311.4]  Out: 600 [Urine:600]  No intake/output data recorded. Intake/Output Summary (Last 24 hours) at 5/11/2019 1559  Last data filed at 5/11/2019 1343  Gross per 24 hour   Intake 386.4 ml   Output 600 ml   Net -213.6 ml     CBC:   Recent Labs     05/10/19  0528 05/10/19  1954 05/11/19  0500   WBC 6.1 6.0 4.6   HGB 9.7* 9.7* 9.2*    161 153     BMP:  Recent Labs     05/09/19  1550 05/10/19  0528 05/11/19  0500    148* 149*   K 4.8 4.2 3.6   * 113* 111*   CO2 24 23 25   BUN 67* 70* 67*   CREATININE 2.2* 2.1* 1.7*   GLUCOSE 116* 102* 100*     -----------------------------------------------------------------  FL MODIFIED BARIUM SWALLOW W VIDEO   Final Result      1. Silent aspiration with thin liquid barium. XR CHEST PORTABLE   Final Result   1. Worsening bilateral effusions and lower lung airspace disease suggesting pulmonary edema. XR CHEST STANDARD (2 VW)   Final Result   Impression:   1. Right pleural effusion and right lung consolidation decreased from 4/15/2019 chest x-ray. Assessment/Plan:     # Acute Blood Loss Anemia Likely due to Lower GI Bleed - stable  Underwent EGD (5/8/19) and hiatal hernia discovered, no active bleeding. Plan for colonoscopy tomorrow, did not drink prep for today  - GI following  - Cont Venofer 100 mg for 1 more dose  - Holding ASA/Plavix/Heparin  - SCD   - Protonix 40 mg PO qD  - Transfuse  Hb < 8 in setting of Hx of CABG  - Daily CBC     # Hypernatremia  - Started on 0.45 NS @ 50ml/hr. Patient encouraged to drink. # Dizziness 2/2 GI Bleed (Improving)  - Lasix 60mg PO BID, home dose was 80mg PO BID. Need to balance his pulmonary status with his lightheadedness. Worsening pulmonary edema when off lasix or lower dose. # Fe Deficiency Anemia  - Cont Venofer  - Colonoscopy planned for tommrow     # Dysphagia  - MBS with silent aspiration with thin liquids. - Will need nectar thick when full diet resumed.       # CAD s/p CABG and AICD placement  - Holding home ASA and Plavix  - lasix 60mg PO BID     # Afib (NSR)  - Cont Diltiazem      # HFrEF   4/12/19 EF 45-50%  - Metoprolol with holding parameters     # Gout   - Cont llopurinol      # Hypothyroidism  - Cont Levothyroxine 100 mcg     # HLD  - Cont Fenofibrate  - Cont Lipitor     # Constipation  - Cont Colace  - Miralax PRN     # Anxiety  - Cont Lexapro  - Xanax PRN    # CKD 4  -  Stable, Cr 1.7 today     FEN - Dietary Nutrition Supplements: Clear Liquid Oral Supplement  Diet NPO, After Midnight  DIET CLEAR LIQUID; Low Sodium (2 GM)  PPx - SCDs, PPI  CODE - Limited  Dispo - GMF    Pt seen and discussed w/ attending, MD Tyrell Schumacher MD  Internal Medicine Resident, PGY-3    Addendum to Resident H& P/Progress note:  I have personally seen,examined and evaluated the patient. I have reviewed the current history, physical findings, labs and assessment and plan and agree with note as documented by resident MD ( )  Discussed the patient's condition with his son, Amna Weldon, Dr Skip Rosen, and RN, Cynthia Cheng.     Joan Srivastava MD, FACP

## 2019-05-12 NOTE — PROGRESS NOTES
Spoke with Dr. Latisha Blake, patient remains at risk for GI bleed and to remain NPO except for sips with meds until follow-up geovanna

## 2019-05-12 NOTE — PLAN OF CARE
Problem: Falls - Risk of:  Goal: Will remain free from falls  Description  Will remain free from falls  5/12/2019 0742 by Betzy Mobley RN  Outcome: Ongoing   D)  Patient identified as fall risk. A)  Fall precautions in place and bed alarm in use. Instructed to call prior to getting OOB for assistance. R)  Verbalized understanding. Remains free from injury and fall. Problem: Cardiac:  Goal: Ability to maintain vital signs within normal range will improve  Description  Ability to maintain vital signs within normal range will improve  5/12/2019 0742 by Betzy Mobley RN  Outcome: Ongoing  BP elevated overnight. Resident made aware and new order placed for Hydralazine PRN. Rechecked prior to giving and /55. Apaced with PVC's on Tele. Will monitor. Problem: Risk for Impaired Skin Integrity  Goal: Tissue integrity - skin and mucous membranes  Description  Structural intactness and normal physiological function of skin and  mucous membranes. 5/12/2019 0742 by Betzy Mobley RN  Outcome: Ongoing   Turning and repositioning Q2 hours heels elevated. Pericare provided after incontinent episodes. Barrier cream applied. Problem: Bowel Function - Altered:  Goal: Bowel elimination is within specified parameters  Description  Bowel elimination is within specified parameters  Outcome: Ongoing   Bowel prep completed, all but 2 cups. NPO after MN. Liquid light brown stools. 1 small clot noted with am.  Paged Dr. Marcos Hernandez this AM to make aware. Awaiting return call. Hgb 7.9 this AM and Potassium 3.4 this AM.  Resident made aware via [address] [place] [address]. No new orders at this time. Patient resting at this time. Will monitor.

## 2019-05-12 NOTE — PROGRESS NOTES
Two tap water enemas administered per orders. One at 2130 and one at 2230. Pt tolerated well both times. Will continue to monitor.

## 2019-05-12 NOTE — PROGRESS NOTES
Resident Progress Note      Admit Date: 2019    PCP: Kobe Weaver MD                  : 1925  MRN: [id number]    Chief Complaint:  Dizziness    Subjective:   Seen and examined at bedside  No acute events overnight, no apparent distress  Hb dropped down to 7.9, will check H/H after colonoscopy today and transfuse if we have to  Will go for colonoscopy today  No complaints, denied abdominal pain, chest pain, SOB/palpitations  Replacing KCl x1  C/W IV hydration and Lasix 60 mg PO BID for now  Encouraged increased oral intake      Diet: Dietary Nutrition Supplements: Clear Liquid Oral Supplement  Diet NPO, After Midnight      Data:   Scheduled Meds:   potassium chloride  40 mEq Oral Once    furosemide  60 mg Oral BID    pantoprazole  40 mg Oral QAM AC    allopurinol  100 mg Oral Daily    atorvastatin  40 mg Oral QAM    docusate sodium  100 mg Oral Daily    escitalopram  5 mg Oral Daily    fenofibrate  54 mg Oral Daily    levothyroxine  100 mcg Oral Daily    therapeutic multivitamin-minerals  1 tablet Oral Daily    vitamin D  1,000 Units Oral Daily    sodium chloride flush  10 mL Intravenous 2 times per day    diltiazem  120 mg Oral Daily    metoprolol succinate  100 mg Oral Daily     Continuous Infusions:   sodium chloride 50 mL/hr at 19 2251     PRN Meds:hydrALAZINE, acetaminophen, ALPRAZolam, fluticasone, melatonin, polyethylene glycol, sodium chloride flush, magnesium hydroxide, ondansetron  I/O last 3 completed shifts: In: 3403 [P.O.:3660; I.V.:759]  Out: 250 [Urine:250]  No intake/output data recorded.     Intake/Output Summary (Last 24 hours) at 2019 0745  Last data filed at 2019 0602  Gross per 24 hour   Intake 4419 ml   Output 250 ml   Net 4169 ml           Objective:     Vitals: BP (!) 151/55   Pulse 67   Temp 96.4 °F (35.8 °C) (Axillary)   Resp 18   Ht 5' 8\" (1.727 m)   Wt 121 lb 0.5 oz (54.9 kg)   SpO2 96%   BMI 18.40 kg/m²     Physical Exam  General appearance: Resting comfortably, no apparent distress  HEENT EOMI, PERRL, no LAD, no oropharyngeal erythema, MMM  Lungs: Minimal bibasilar crackles, now wheezing  Heart: RRR +S1/S2 without murmurs, rubs or gallops  Abdomen: Soft, NTND, without rigidity or guarding, normal active bowel sounds  Extremities: No peripheral edema, good peripheral pulses  Skin: No skin rashes  Neurologic: AAOx4, CNs II-XII grossly intact, moving all extremities spontaneously       LABS:    CBC:   Recent Labs     05/10/19  1954 05/11/19  0500 05/12/19  0453   WBC 6.0 4.6 3.9*   HGB 9.7* 9.2* 7.9*   HCT 30.8* 28.0* 24.0*   MCV 95.4 93.4 92.8    153 146                                                                BMP:    Recent Labs     05/10/19  0528 05/11/19  0500 05/12/19  0452   * 149* 148*   K 4.2 3.6 3.4*   * 111* 110   CO2 23 25 27   BUN 70* 67* 43*   CREATININE 2.1* 1.7* 1.5*   GLUCOSE 102* 100* 92       -----------------------------------------------------------------  RAD:   FL MODIFIED BARIUM SWALLOW W VIDEO   Final Result      1. Silent aspiration with thin liquid barium. XR CHEST PORTABLE   Final Result   1. Worsening bilateral effusions and lower lung airspace disease suggesting pulmonary edema. XR CHEST STANDARD (2 VW)   Final Result   Impression:   1. Right pleural effusion and right lung consolidation decreased from 4/15/2019 chest x-ray. Assessment/Plan:     # Acute Blood Loss Anemia (Stable)  Underwent EGD (5/8/19) and hiatal hernia discovered, no active bleeding. had total of 2 untis of PRBC for Hb < 8. Plan for colonoscopy today, completed bowel prep yesterday.    - GI following  - Holding ASA/Plavix/Heparin  - SCD   - Protonix 40 mg PO qD  - Transfuse  Hb < 8 in setting of Hx of CABG  - Daily CBC     # Hypernatremia (Stable)  - Started on 0.45 NS @ 50ml/hr.   - Patient encouraged to drink.      # Hypokalemia  In setting of Lasix  - Jose Angel to keep K > 4  - Replaced KCl 40 mEq x1     # Dizziness 2/2 GI Bleed (Improving)  - Lasix 60mg PO BID, home dose was 80mg PO BID.   - Need to balance his pulmonary status with his lightheadedness. - Worsening pulmonary edema when off lasix or lower dose.      # Fe Deficiency Anemia (Stable)  Completed 5 rounds of Venofer  - Will continue to monitor.       # Dysphagia  - MBS with silent aspiration with thin liquids. - Will need nectar thick when full diet resumed.      # CAD s/p CABG and AICD placement  - Holding home ASA and Plavix  - lasix 60mg PO BID     # Afib (Paced)  - Cont Diltiazem      # HFrEF   4/12/19 EF 45-50%  - Metoprolol with holding parameters     # Gout   - Cont llopurinol      # Hypothyroidism  - Cont Levothyroxine 100 mcg     # HLD  - Cont Fenofibrate  - Cont Lipitor     # Constipation  - Cont Colace  - Miralax PRN     # Anxiety  - Cont Lexapro  - Xanax PRN     # CKD 4  -  Stable, Cr 1.7 today      FEN - Dietary Nutrition Supplements: Clear Liquid Oral Supplement  Diet NPO  DIET CLEAR LIQUID; Low Sodium (2 GM)  PPx - SCDs, PPI  CODE - Limited  Dispo - GMF    Billie Rogers MD  5/12/2019  7:45 AM  Pager:  [phone]    Addendum to Resident H& P/Progress note:  I have personally seen,examined and evaluated the patient.  I have reviewed the current history, physical findings, labs and assessment and plan and agree with note as documented by resident MD Yousuf Anna)    The patient underwent colonoscopy today in the morning; the results were d/w Dr Althea Grimes MD, Jamie Banerjee

## 2019-05-12 NOTE — OP NOTE
Colonoscopy:    Bleeding point was identified in ascending colon consistent with bleeding angiodysplasia. Bishop Javier Hemostasis was achieved with applying two resolution clips and hemospary. Diverticulosis   Several colon polyps. Not removed given the increased bleeding risk. They appear benign. Mr Tiff Cook must remain NPO for now except for meds and water.     tahira Johnson.  9-26-00

## 2019-05-12 NOTE — PROGRESS NOTES
Axillary 67 18 96 % 121 lb 0.5 oz (54.9 kg)   19 2318 (!) 170/59 97.6 °F (36.4 °C) Oral 65 20 98 % --   19 1921 (!) 186/60 97.2 °F (36.2 °C) Oral 68 20 95 % --   19 1635 (!) 162/60 97.5 °F (36.4 °C) Oral 59 18 97 % --   19 1341 (!) 168/63 97.5 °F (36.4 °C) Oral 59 18 96 % --       Exam:    VITALS:  BP (!) 171/62   Pulse 60   Temp 97.3 °F (36.3 °C) (Oral)   Resp 12   Ht 5' 8\" (1.727 m)   Wt 121 lb 0.5 oz (54.9 kg)   SpO2 97%   BMI 18.40 kg/m²   TEMPERATURE:  Current - Temp: 97.3 °F (36.3 °C); Max - Temp  Av.3 °F (36.3 °C)  Min: 96.4 °F (35.8 °C)  Max: 97.6 °F (36.4 °C)    NAD  General appearance: alert, appears stated age, cooperative and no distress  Head: Normocephalic, without obvious abnormality, atraumatic  Neck: supple, symmetrical, trachea midline and thyroid not enlarged, symmetric, no tenderness/mass/nodules  CVS:  Atrial Fib  Lungs CTA Bilaterally, normal effort  Abdomen: soft, non-tender; bowel sounds normal; no masses,  no organomegaly  AAOx3, No asterixis or encephalopathy  Extremities: No edema. Lab Data:  Recent Labs     05/10/19  1954 05/11/19  0500 19  0453   WBC 6.0 4.6 3.9*   HGB 9.7* 9.2* 7.9*   HCT 30.8* 28.0* 24.0*   MCV 95.4 93.4 92.8    153 146     Recent Labs     19  1550 05/10/19  0528 19  0500 19  0452    148* 149* 148*   K 4.8 4.2 3.6 3.4*   * 113* 111* 110   CO2 24 23 25 27   PHOS 2.4*  --   --   --    BUN 67* 70* 67* 43*   CREATININE 2.2* 2.1* 1.7* 1.5*     No results for input(s): AST, ALT, ALB, BILIDIR, BILITOT, ALKPHOS in the last 72 hours. No results for input(s): LIPASE, AMYLASE in the last 72 hours. No results for input(s): PROT, INR in the last 72 hours. No results for input(s): PTT in the last 72 hours. No results for input(s): OCCULTBLD in the last 72 hours.     Hg down to 7.9 this am    Assessment:       Active Problems:    Dizziness    Chronic systolic heart failure (HCC)    Dyspnea on exertion    Volume depletion    Oropharyngeal dysphagia    Hypernatremia    Severe malnutrition (HCC)  Resolved Problems:    * No resolved hospital problems. *    CKD  Atrial fibrillation  CAD. S/p cabg      Recommendations:       cardiac and pulmonary status were assessed prior to procedure. Stable to proceed.      Xiao Velazquez MD  5/12/2019  9 am

## 2019-05-13 NOTE — PROGRESS NOTES
Physical Therapy  Facility/Department: [facility] [address] [facility]  Daily Treatment Note  NAME: Robert Cole  : 1925  MRN: [id number]    Date of Service: 2019    Discharge [address] scored a 17/24 on the AM-PAC short mobility form. Current research shows that an AM-PAC score of 17 or less is typically not associated with a discharge to the patient's home setting. Based on the patients AM-PAC score and their current functional mobility deficits, it is recommended that the patient have 3-5 sessions per week of Physical Therapy at d/c to increase the patients independence. Assessment: Needing increased assist for transfers/gt this date and decreased gt endurance. At risk for falls and not safe to ambulate alone. Rec cont skilled PT to maximize mobility and independence        PT Equipment Recommendations  Equipment Needed: (defer)    Patient Diagnosis(es): The primary encounter diagnosis was Chronic congestive heart failure, unspecified heart failure type ([address] A diagnosis of Dyspnea on exertion was also pertinent to this visit. has a past medical history of Anemia, Anxiety, Atrial fibrillation (HCC), CAD (coronary artery disease), CHF (congestive heart failure) ([address] Chronic renal insufficiency, Gout, History of BPH, Hyperlipidemia, Hypertension, and Hypothyroidism. has a past surgical history that includes Pacemaker insertion (); Coronary artery bypass graft (); shoulder surgery (Right); Colon surgery (); Cardiac defibrillator placement; Upper gastrointestinal endoscopy (N/A, 2019); and Colonoscopy (2019). Restrictions  Position Activity Restriction  Other position/activity restrictions: up with assist  Subjective   General  Chart Reviewed: Yes  Additional Pertinent Hx: Pt is a 80 y.o. male adm  with BATEMAN. Came to ED with SOB. CXR: R pleural effusion and R lung consolidation.   PMH: anxiety, Afib, CAD< CHF, gout, hyperlipidemia, HTN, pacemaker, Ongoing  Short term goal 3: Pt will amb >100' with AD supervision. Ongoing    Plan    Plan  Times per week: 2-5  Current Treatment Recommendations: Balance Training, Functional Mobility Training, Endurance Training, Gait Training, Safety Education & Training, Patient/Caregiver Education & Training  Safety Devices  Type of devices: Call light within reach, Chair alarm in place, Nurse notified, Left in chair     Therapy Time   Individual Concurrent Group Co-treatment   Time In 1320         Time Out 1344         Minutes 24               Timed Code Treatment Minutes:24       Total Treatment Minutes:  24  If pt d/c'd prior to next treatment, this note serves as a discharge note.     Mikel Garcia, PT

## 2019-05-13 NOTE — PROGRESS NOTES
GI Progress Note    Jayme Marc is a 80 y.o. male patient. 1. Chronic congestive heart failure, unspecified heart failure type ([address]    2. Dyspnea on exertion    3. GI bleed  4. Acute blood loss anemia  5. CAD  6. CKD    Admit Date: 2019    Subjective:       Doing better today  No abdominal pain  No further bleed  Hg 8.5   Started on clear liquid     Scheduled Meds:   furosemide  60 mg Oral BID    pantoprazole  40 mg Oral QAM AC    allopurinol  100 mg Oral Daily    atorvastatin  40 mg Oral QAM    docusate sodium  100 mg Oral Daily    escitalopram  5 mg Oral Daily    fenofibrate  54 mg Oral Daily    levothyroxine  100 mcg Oral Daily    therapeutic multivitamin-minerals  1 tablet Oral Daily    vitamin D  1,000 Units Oral Daily    sodium chloride flush  10 mL Intravenous 2 times per day    diltiazem  120 mg Oral Daily    metoprolol succinate  100 mg Oral Daily       Continuous Infusions:   sodium chloride 75 mL/hr at 19 1132       PRN Meds:  hydrALAZINE, acetaminophen, ALPRAZolam, fluticasone, melatonin, polyethylene glycol, sodium chloride flush, magnesium hydroxide, ondansetron      Objective:       Patient Vitals for the past 24 hrs:   BP Temp Temp src Pulse Resp SpO2 Weight   19 1600 (!) 101/51 97.1 °F (36.2 °C) Oral 60 18 94 % --   19 1133 (!) 113/56 98.3 °F (36.8 °C) Oral 64 16 96 % --   19 0845 (!) 116/53 98 °F (36.7 °C) Oral 85 16 94 % --   19 0546 -- -- -- -- -- -- 122 lb 5.7 oz (55.5 kg)   19 0350 (!) 125/52 97.1 °F (36.2 °C) Oral 59 16 94 % --   19 0045 (!) 124/53 98 °F (36.7 °C) Oral 55 16 95 % --   19 1935 (!) 139/51 98.2 °F (36.8 °C) Oral 57 18 96 % --       Exam:    VITALS:  BP (!) 101/51   Pulse 60   Temp 97.1 °F (36.2 °C) (Oral)   Resp 18   Ht 5' 8\" (1.727 m)   Wt 122 lb 5.7 oz (55.5 kg)   SpO2 94%   BMI 18.60 kg/m²   TEMPERATURE:  Current - Temp: 97.1 °F (36.2 °C);  Max - Temp  Av.8 °F (36.6 °C)  Min:

## 2019-05-13 NOTE — CARE COORDINATION
Case Management Daily Note:    Current Plan of Care: awaiting GI clearance for diet to be resumed      PT AM-PAC: 17 /24  Per last eval on: 05/06/2019    OT AM-PAC: 18 /24  Per last eval on: 05/06/2019    DME needs: deferred to SNF       Discharge Plan: SNF at [address] [place] [address]    Tentative Discharge Date: 05/14/2019    Current Barriers to Discharge: GI clearance for diet advancement    Resources/Information given: none at this time      Case Management Notes: CM continues to follow for discharge planning and needs. Patient still awaiting GI clearance for diet advancement and follow up. CM will continue to follow for discharge planning and needs.     Thank you,  Soy Persaud RN,   4th Floor Progressive Care Unit  [phone]

## 2019-05-13 NOTE — OP NOTE
[address]               [address] [place], [address]                                OPERATIVE REPORT    PATIENT NAME: Stu Velasco                        :        1925  MED REC NO:   [id number]                          ROOM:       5150  ACCOUNT NO:   [de-identified]                           ADMIT DATE: 2019  PROVIDER:     Radha Llanos MD    DATE OF PROCEDURE:  2019    INDICATION FOR PROCEDURE:  The procedure was done as an emergency for GI  bleed. SURGEON:  Radha Llanos MD    OPERATIVE PROCEDURE:  With the patient in the left lateral position and  after sedation with 25 mcg of fentanyl and 1 mg of Versed IV, the  Olympus video PCF colonoscope was inserted into the rectum and carefully  advanced to the cecum in the poorly prepared colon. We washed the colon  vigorously during the entire procedure. Diverticulosis of the colon was  seen. Several polyps were noted scattered throughout the colon. A  bleeding point was identified in the ascending colon consistent with  bleeding angiodysplasia. Hemostasis was achieved using the two  Resolution clips and Hemospray methods. Careful inspection revealed no  other abnormality although stool retention can obscure lesions. We had  to perform the procedure in a difficult circumstance due to stool  retention. Scope was finally removed and procedure was terminated  without complications. IMPRESSION:  1. Bleeding angiodysplasia in the ascending colon. Hemostasis was  achieved with Resolution clips and Hemospray. 2.  Diverticulosis of the colon. 3.  Several colon polyps. 4.  Poorly prepared colon. PLAN:  The patient must remain n.p.o. for now. We will continue to  monitor his hemoglobin and hematocrit. Plavix should not be started at  the present time. Thank you Dr. Criselda Willett.         Carmen Culp MD    D: 2019 11:34:58       T: 2019 20:44:19     [email or web address]  Job#: [id number] Doc#: [id number]    CC:   MD Roger Saeed MD Dayla Gathers, MD

## 2019-05-13 NOTE — PROGRESS NOTES
Speech Language Pathology  Facility/Department: [place] [address] PCU  Dysphagia Daily Treatment Note    NAME: Fouzia Child  : 1925  MRN: [id number]    Patient Diagnosis(es):   Patient Active Problem List    Diagnosis Date Noted    Essential hypertension 2016     Priority: Medium    Automatic implantable cardioverter-defibrillator in situ 10/03/2011     Priority: Medium    Paroxysmal ventricular tachycardia ([address] 10/03/2011     Priority: Medium    Carotid artery stenosis 2011     Priority: Medium    Hypertrophy of prostate without urinary obstruction and other lower urinary tract symptoms (LUTS) 2010     Priority: Medium    Disorder of kidney and ureter 2010     Priority: Medium    Coronary atherosclerosis 2010     Priority: Medium    Light headed 2011     Priority: Low    Gout 2010     Priority: Low    Acquired hypothyroidism 2010     Priority: Low    GERD (gastroesophageal reflux disease) 2010     Priority: Low    Severe malnutrition ([address] 05/10/2019    Oropharyngeal dysphagia     Hypernatremia     Dyspnea on exertion 2019    Volume depletion     Pleural effusion on right 2019    Pneumonia 2019    Hypertensive urgency     Hypokalemia     Paroxysmal atrial fibrillation (HCC)     BATEMAN (dyspnea on exertion) 2017    Chronic systolic heart failure (HCC)     Pleural effusion transudative     Non-sustained ventricular tachycardia (HCC)     Acute on chronic diastolic heart failure (HCC)     MGUS (monoclonal gammopathy of unknown significance) 2016    Iron deficiency anemia 2016    Atrial tachycardia (HCC)     Chronic atrial fibrillation ([address]     Diarrhea 2016    Anxiety 2016    Foot trauma 03/15/2016    Hypothyroidism due to acquired atrophy of thyroid 2016    CKD (chronic kidney disease) stage 4, GFR 15-29 ml/min ([address] 10/13/2015    Hyperlipidemia 10/13/2015    Dizziness 2011 Allergies: Allergies   Allergen Reactions    Lactose          CXR (5/9/19)-     Impression   1. Worsening bilateral effusions and lower lung airspace disease suggesting pulmonary edema. Previous MBS - None     Chart reviewed. Medical Diagnosis: Dyspnea  Treatment Diagnosis: Oropharyngeal Dysphagia    BSE Impression (5/9/19)-  Pt had episode of choking and aspirating water this morning while taking medication. Pt reports having difficulty swallowing large pills. Oral- ROM or oral muscles was [place] [place]. No difficulty with mastication with solids. Pharyngeal- pt noted to frequently  \"throw head back\" when eating/drinking. When questioned about this, pt felt this could be a habit. Pt educated to importance of keeping head a midline to increase airway protection. Pt noted to throat clear following most trials, regardless of consistency. Pt with belching following water by cup, which he reports is typical. Pt was able to drink 3oz water continuously without difficulty. Given pt's age, frequent throat clearing and difficulty swallowing large pills, pt would benefit from further instrumental assessment. MBS results -5/10/19  Oral Phase: Pt demonstrates head tilt with unclear etiology but would suspect this is related to some oral weakness and at this point appears habitual. The patient was able to propel all other bolus posteriorly with cue to maintain neutral head positioning. Pharyngeal: Patient demonstrated chantelle min-moderate SILENT aspiration of thins via tsp, cup and straw with and without chin tuck. The chin tuck was effective in minimizing aspiration but the patient had SILENT aspiration after the swallow on remaining residue in the pharynx. The patient presents with reduced tongue base retraction, reduced pharyngeal peristalsis/stripping wave and reduced hyolaryngeal excursion which contribute to poor airway protection and residue after the swallow.  The patient demonstrated increased residue with thins and nectars vs pudding and solids. It should be noted that the patient demonstrated dry throat clearing prior to the study, SILENT aspiration during the study and did demonstrate overtly wet vocal quality after the study along with belching. recommended regular/nectar thick (pending pt/family wishes and MD decision)          Pain:  None indicated     Current Diet : Diet clear liquid    Treatment:  Pt seen bedside to address the following goals 3-5x per week:  1- The patient will tolerate recommended diet without observed clinical signs of aspiration;  5/10 - Pt assessed upright in bed this date with breakfast tray. Patient tolerated thins via cup and jello without overt s/s of aspiration. Thins via straw resulted in minimal throat clearing. Patient also noted to have belching following trials. Denies any further choking episodes. Discussed with MD who is in agreement for MBS this date. Continue goal as per MBS.   5/13- discon't goal- pt/family choose thin liquids for QOL         2-The patient will tolerate instrumental swallowing procedure when able  5/9-  Will schedule today or tomorrow, when radiology's schedule allows . 5/10 - MBS this date. 5/10- goal met      2- The pt/family will demonstrate understanding of swallowing recommendations and concerns. 5/9-  The pt and family were educated to purpose of the visit, anatomy and physiology of the swallow, rational for keeping head at midline vs hyperextending back when swallowing, concerns for aspiration, swallowing strategies, diet recommendations and plan to perform MBS when radiology's schedule allows. The pt stated comprehension. con't goal  5/10 - No family present. Educated on importance of oral care in preventing aspiration pneumonia, rationale for MBS, procedure to take place this date. Educated on keeping head at midline during swallow. Continue goal.   5/13- per RN, family and MD chose for pt to con't with thin liquids for QOL.  Discussed with Pt, who stated \"I don't really remember\" Educated pt to the results of the MBS, silent aspiration and that MD and family discussed (likely with him) that they wish for pt to continue with thin liquids for quality of life. Pt educated to risk of aspiration with thin liquids, but respect family wishes for quality of life. Pt educated to results of the MBS, strategies to help decrease aspiration and was provided with a hand out for future reference. Pt stated comprehension and was able to state 1 strategy after review. Pt would benefit from reinforcement. con't goal      Patient/Family/Caregiver Education:  No family present. Education as above. Compensatory Strategies:  Upright as possible for all oral intake;  Remain upright for 30-45 minutes after meals,   keep head at midline when swallowing  Alternate between liquids and solids  Swallow twice after each bite/sip  Effortful swallow       Plan:  Continue goals per POC  Recommended diet:clear liquid (thin) per pt/family/MD wishes  Swallowing strategies to decrease risk for aspiration  Total treatment time:15  Discharge Plan: To be determined closer to discharge  Discussed with Deb YORK   Needs within reach.        Elliott Williamson, [facility]- [address] [place] [address]  Pg # [phone]  This document will serve as a discharge summary if pt discharge before next treatment   session         If patient is discharged prior to next treatment, this note will serve as the discharge summary

## 2019-05-13 NOTE — PROGRESS NOTES
Resident Progress Note      Admit Date: 2019    PCP: Rajiv Blas MD                  : 1925  MRN: [id number]    Chief Complaint:  Dizziness, SOB    Subjective:   Seen and examined at bedside  No acute events overnight, no apparent distress  S/P Colonoscopy on 19  Still has poor oral intake, currently NPO, will get him a diet today  Denied chest pain, SOB, or palpitations      Diet: Dietary Nutrition Supplements: Clear Liquid Oral Supplement  Diet NPO, After Midnight    Data:   Scheduled Meds:   furosemide  60 mg Oral BID    pantoprazole  40 mg Oral QAM AC    allopurinol  100 mg Oral Daily    atorvastatin  40 mg Oral QAM    docusate sodium  100 mg Oral Daily    escitalopram  5 mg Oral Daily    fenofibrate  54 mg Oral Daily    levothyroxine  100 mcg Oral Daily    therapeutic multivitamin-minerals  1 tablet Oral Daily    vitamin D  1,000 Units Oral Daily    sodium chloride flush  10 mL Intravenous 2 times per day    diltiazem  120 mg Oral Daily    metoprolol succinate  100 mg Oral Daily     Continuous Infusions:   sodium chloride 50 mL/hr at 19 1815     PRN Meds:hydrALAZINE, acetaminophen, ALPRAZolam, fluticasone, melatonin, polyethylene glycol, sodium chloride flush, magnesium hydroxide, ondansetron  I/O last 3 completed shifts: In: 1258.4 [I.V.:1258.4]  Out: 600 [Urine:600]  I/O this shift:   In: 46.8 [I.V.:46.8]  Out: -     Intake/Output Summary (Last 24 hours) at 2019 0918  Last data filed at 2019 0845  Gross per 24 hour   Intake 1200 ml   Output 600 ml   Net 600 ml           Objective:     Vitals: BP (!) 116/53   Pulse 85   Temp 98 °F (36.7 °C) (Oral)   Resp 16   Ht 5' 8\" (1.727 m)   Wt 122 lb 5.7 oz (55.5 kg)   SpO2 94%   BMI 18.60 kg/m²     Physical Exam     General appearance: Resting comfortably, no apparent distress  HEENT EOMI, PERRL, no LAD, no oropharyngeal erythema, MMM  Lungs: Minimal bibasilar crackles, now wheezing  Heart: RRR +S1/S2 without murmurs, Blood Loss Anemia (Stable)  Underwent EGD (5/8/19) and hiatal hernia discovered, no active bleeding. had total of 2 untis of PRBC for Hb < 8. Colonoscopy (5/12/19) with bleeding angiodysplasia (clipped x2) in ascending colon, several polyps that looked benign, and diverticulosis. - GI following  - Holding ASA/Plavix/Heparin  - SCD   - Protonix 40 mg PO qD  - Transfuse  Hb < 8 in setting of Hx of CABG  - Daily CBC     # Hypernatremia (Worse)  Na increased to 150. He is having a poor oral intake. Currently on 1/2 NS that will be increased to 75 cc/hr  - 1/2 NS increased to 75 cc/hr  - Patient encouraged to drink.      # Dizziness 2/2 GI Bleed (Resolved)  - Lasix 60mg PO BID, home dose was 80mg PO BID.   - Need to balance his pulmonary status with his lightheadedness. - Worsening pulmonary edema when off lasix or lower dose.      # Fe Deficiency Anemia (Stable)  Completed 5 rounds of Venofer  - Will continue to monitor.       # Dysphagia  - MBS with silent aspiration with thin liquids. - Will need nectar thick when full diet resumed.      # CAD s/p CABG and AICD placement  - Holding home ASA and Plavix  - lasix 60mg PO BID     # Afib (Paced)  - Cont Diltiazem      # HFrEF   4/12/19 EF 45-50%  - Metoprolol with holding parameters     # Gout   - Cont llopurinol      # Hypothyroidism  - Cont Levothyroxine 100 mcg     # HLD  - Cont Fenofibrate  - Cont Lipitor     # Constipation  - Cont Colace  - Miralax PRN     # Anxiety  - Cont Lexapro  - Xanax PRN     # CKD 4  -  Stable, Cr 1.7 today      Code Status: Limited No to everything  FEN: NPO, awaiting recs from Dr. Dio Leal  PPx: SCD PPI  Dispo: GMELIU Mata MD  5/13/2019  9:18 AM  Pager:  [phone]    Addendum to Resident H& P/Progress note:  I have personally seen,examined and evaluated the patient. I have reviewed the current history, physical findings, labs and assessment and plan and agree with note as documented by resident MD [facility]).   The patient had evaluation by speech pathologist; recommendations were reviewed and greatly appreciated. Discussed the patient's condition with GI specialist, Dr. Omar Vance. Hypokalemia has been corrected. Sodium-150  Hopefully, the patient will be ready for discharge to nursing home tomorrow.     Gris Beck MD, FACP

## 2019-05-14 NOTE — DISCHARGE SUMMARY
Troponins 0.03-->0.04-->0. 04. CBC showed Hgb 9.2, down from 11.2 three weeks ago. Cr 2.3, BUN in 80's. Hospital Course:     Acute Blood Loss Anemia   Underwent EGD (5/8/19) and hiatal hernia discovered, no active bleeding. had total of 2 untis of PRBC for Hb < 8.  Colonoscopy (5/12/19) with bleeding angiodysplasia (clipped x2) in ascending colon, several polyps that looked benign, and diverticulosis. - Holding ASA/Plavix on discharge  - Pepcid   - Daily CBC x 3 on discharge at [facility]    Fe Deficiency Anemia (Stable)  Completed 6 rounds of Venofer in house    HFrEF (Stable)  4/12/19 EF 45-50%  - Metoprolol   - Discharging on Lasix 60 mg PO BID     Dysphagia  - MBS with silent aspiration with thin liquids. - Full Liquid Diet x 2 days on discharge  - Then Continue with Low Residue Diet x 2 weeks     CAD s/p CABG and AICD placement  - Holding home ASA and Plavix     Afib (Paced)  - Cont Diltiazem      Gout   - Cont llopurinol      Hypothyroidism  - Cont Levothyroxine 100 mcg     HLD  - Cont Fenofibrate  - Cont Lipitor     Constipation  - Cont Colace  - Miralax PRN     Anxiety  - Cont Lexapro  - Xanax PRN    Disposition:  Skilled Facility    Exam:     BP (!) 132/55   Pulse 60   Temp 98 °F (36.7 °C) (Oral)   Resp 20   Ht 5' 8\" (1.727 m)   Wt 123 lb 14.4 oz (56.2 kg)   SpO2 95%   BMI 18.84 kg/m²     General appearance: Resting comfortably, no apparent distress  HEENT EOMI, PERRL, no LAD, no oropharyngeal erythema, MMM  Lungs: Minimal bibasilar crackles, now wheezing  Heart: RRR +S1/S2 without murmurs, rubs or gallops  Abdomen: Soft, NTND, without rigidity or guarding, normal active bowel sounds  Extremities: No peripheral edema, good peripheral pulses  Skin: No skin rashes  Neurologic: AAOx4, CNs II-XII grossly intact, moving all extremities spontaneously     Consults:     IP CONSULT TO PRIMARY CARE PROVIDER  IP CONSULT TO INTERNAL MEDICINE  IP CONSULT TO GI  IP CONSULT TO SOCIAL WORK    Labs:  For convenience DAY  Qty: 90 tablet, Refills: 1    Comments: **Patient requests 90 days supply**      vitamin D (CHOLECALCIFEROL) 1000 UNIT TABS tablet Take 1,000 Units by mouth daily      allopurinol (ZYLOPRIM) 100 MG tablet TAKE 1 TABLET BY MOUTH DAILY  Qty: 90 tablet, Refills: 2      Multiple Vitamins-Minerals (CENTRUM SILVER) TABS TAKE 1 TABLET BY MOUTH DAILY  Qty: 90 tablet, Refills: 2      levothyroxine (SYNTHROID) 100 MCG tablet TAKE 1 TABLET BY MOUTH DAILY  Qty: 90 tablet, Refills: 2      atorvastatin (LIPITOR) 40 MG tablet TAKE 1 TABLET BY MOUTH EVERY MORNING  Qty: 90 tablet, Refills: 2      metoprolol succinate (TOPROL XL) 100 MG extended release tablet Take 1 tablet by mouth daily  Qty: 90 tablet, Refills: 3      diltiazem (CARDIZEM CD) 120 MG extended release capsule TAKE 1 CAP BY MOUTH DAILY (DX: _________)  Iraj Sharps: 30 capsule, Refills: 10      famotidine (PEPCID) 20 MG tablet TAKE 1 TABLET BY MOUTH DAILY AS NEEDED FOR REFLUX  Qty: 90 tablet, Refills: 3    Comments: **Patient requests 90 days supply**      Melatonin 5 MG CAPS TAKE 1 CAPSULE BY MOUTH NIGHTLY AS NEEDED FOR SLEEP  Qty: 90 capsule, Refills: 3      fluticasone (FLONASE) 50 MCG/ACT nasal spray USE 2SPRAYS BY NASAL ROUTE DAILY  Qty: 3 Bottle, Refills: 3    Comments: **Patient requests 90 days supply**      docusate sodium (COLACE, DULCOLAX) 100 MG CAPS Take 100 mg by mouth daily  Qty: 30 capsule, Refills: 1      polyethylene glycol (GLYCOLAX) packet Take 17 g by mouth daily as needed for Constipation  Qty: 527 g, Refills: 1           Current Discharge Medication List      STOP taking these medications       ASPIRIN LOW DOSE 81 MG EC tablet Comments:   Reason for Stopping:         clopidogrel (PLAVIX) 75 MG tablet Comments:   Reason for Stopping:         Acetaminophen (APAP) 325 MG TABS Comments:   Reason for Stopping:                Follow-up appointments:  one week    Provider Follow-up:    Dr. Anna Bai [facility]    Condition at Discharge:  Good    The patient was seen and examined on day of discharge and this discharge summary is in conjunction with any daily progress note from day of discharge. Time spent on discharge is more than 20 minutes in the examination, evaluation, counseling and review of medications and discharge plan. Signed:    Marrosa Annderick   5/14/2019     Addendum to Resident H& P/Progress note:  I have personally seen,examined and evaluated the patient. I have reviewed the current history, physical findings, labs and assessment and plan and agree with note as documented by resident MD Ruben Crook)  Will need to take Fe sulfate 325 mg PO daily; will call to [place] of Marielena Chen MD, FACP          Thank you Ta Sanchez MD for the opportunity to be involved in this patient's care.  If you have any questions or concerns please feel free to contact me at [phone]

## 2019-05-14 NOTE — PROGRESS NOTES
GI Progress Note    Kourtney Hawthorne is a 80 y.o. male patient. 1. Chronic congestive heart failure, unspecified heart failure type ([address]    2. Dyspnea on exertion    3. GI bleed  4.      acute blood loss anemia  5. CAD  6. CKD      Admit Date: 2019    Subjective:       Feels well this am  No further gi bleed  Tolerating clear liquid  No abdominal pain  Discussed colonoscopy finding        Objective:       Patient Vitals for the past 24 hrs:   BP Temp Temp src Pulse Resp SpO2 Weight   19 1304 (!) 137/52 96.5 °F (35.8 °C) Oral 60 16 94 % --   19 0945 (!) 132/55 98 °F (36.7 °C) Oral -- 20 95 % --   19 0641 -- -- -- -- -- -- 123 lb 14.4 oz (56.2 kg)   19 0458 (!) 140/58 97.5 °F (36.4 °C) Oral 60 18 92 % --   19 0043 (!) 117/51 97 °F (36.1 °C) Oral 56 18 93 % --   19 127/70 97.8 °F (36.6 °C) Oral 59 18 95 % --       Exam:    VITALS:  BP (!) 137/52   Pulse 60   Temp 96.5 °F (35.8 °C) (Oral)   Resp 16   Ht 5' 8\" (1.727 m)   Wt 123 lb 14.4 oz (56.2 kg)   SpO2 94%   BMI 18.84 kg/m²   TEMPERATURE:  Current - Temp: 96.5 °F (35.8 °C); Max - Temp  Av.4 °F (36.3 °C)  Min: 96.5 °F (35.8 °C)  Max: 98 °F (36.7 °C)    NAD  General appearance: alert, appears stated age, cooperative and no distress  Head: Normocephalic, without obvious abnormality, atraumatic  Neck: supple, symmetrical, trachea midline and thyroid not enlarged, symmetric, no tenderness/mass/nodules  CVS:  RRR, Nl s1s2  Lungs CTA Bilaterally, normal effort  Abdomen: soft, non-tender; bowel sounds normal; no masses,  no organomegaly  AAOx3, No asterixis or encephalopathy  Extremities: No edema.     Lab Data:  Recent Labs     19  0453  19  0458 19  0124 19  0458 19  1210   WBC 3.9*  --  5.3  --  5.1  --    HGB 7.9*   < > 8.5* 7.8* 7.8* 7.9*   HCT 24.0*   < > 26.7* 24.4* 23.9* 25.1*   MCV 92.8  --  95.1  --  93.6  --      --  150  --  159  --     < > = values in this interval not displayed. Recent Labs     05/12/19  0452 05/13/19  0458 05/14/19  0458   * 150* 139   K 3.4* 4.2 4.5    110 103   CO2 27 21 25   BUN 43* 35* 35*   CREATININE 1.5* 1.6* 1.6*     No results for input(s): AST, ALT, ALB, BILIDIR, BILITOT, ALKPHOS in the last 72 hours. No results for input(s): LIPASE, AMYLASE in the last 72 hours. No results for input(s): PROT, INR in the last 72 hours. No results for input(s): PTT in the last 72 hours. No results for input(s): OCCULTBLD in the last 72 hours. Assessment:       Active Problems:    Dizziness    Chronic systolic heart failure (HCC)    Dyspnea on exertion    Volume depletion    Oropharyngeal dysphagia    Hypernatremia    Severe malnutrition (HCC)  Resolved Problems:    * No resolved hospital problems.  *    GI bleed secondary to bleeding  Angiodysplasia of the colon  Acute blood loss anemia  diverticulosis    Recommendations:       Patient will be discharged today if h an h did not drop on th repeat     Discussed with Dr Adolfo Adan MD  5/14/2019

## 2019-05-14 NOTE — PLAN OF CARE
Problem: Falls - Risk of:  Goal: Will remain free from falls  Description  Will remain free from falls  Outcome: Ongoing  Goal: Absence of physical injury  Description  Absence of physical injury  Outcome: Ongoing     Problem: Cardiac:  Goal: Ability to maintain vital signs within normal range will improve  Description  Ability to maintain vital signs within normal range will improve  Outcome: Ongoing  Goal: Cardiovascular alteration will improve  Description  Cardiovascular alteration will improve  Outcome: Ongoing     Problem: Health Behavior:  Goal: Will modify at least one risk factor affecting health status  Description  Will modify at least one risk factor affecting health status  Outcome: Ongoing  Goal: Identification of resources available to assist in meeting health care needs will improve  Description  Identification of resources available to assist in meeting health care needs will improve  Outcome: Ongoing     Problem: Physical Regulation:  Goal: Complications related to the disease process, condition or treatment will be avoided or minimized  Description  Complications related to the disease process, condition or treatment will be avoided or minimized  Outcome: Ongoing     Problem: Nutrition  Goal: Optimal nutrition therapy  Outcome: Ongoing     Problem: Risk for Impaired Skin Integrity  Goal: Tissue integrity - skin and mucous membranes  Description  Structural intactness and normal physiological function of skin and  mucous membranes. Outcome: Ongoing     Problem: Discharge Planning:  Goal: Discharged to appropriate level of care  Description  Discharged to appropriate level of care  Outcome: Ongoing     Problem:  Bowel Function - Altered:  Goal: Bowel elimination is within specified parameters  Description  Bowel elimination is within specified parameters  Outcome: Ongoing

## 2019-05-14 NOTE — CARE COORDINATION
Case Management Discharge Assessment    2019  [facility] Case Management Department    Patient: Chi Kan  MRN: [id number] / : 1925  ACCT: [de-identified]          Admission Documentation  Attending Provider: Guille Segovia MD  Admit date/time: 2019  6:59 AM  Status: Inpatient [101]  Diagnosis: Dyspnea on exertion     Readmission within last 30 days:  yes     Living Situation  Discharge Planning  Living Arrangements: Spouse/Significant Other  Support Systems: Spouse/Significant Other, Family Members  Potential Assistance Needed: Charanjit Winter  Type of Home Care Services: [address]  Patient expects to be discharged to[de-identified] home   Expected Discharge Date: 19    Service Assessment:       Values / Beliefs  Do you have any ethnic, cultural, sacramental, or spiritual Congregation needs you would like us to be aware of while you are in the hospital?: No    Advance Directives (For Healthcare)  Healthcare Directive: Yes, patient has an advance directive for healthcare treatment  Copy in Chart: Yes, copy in chart    Pharmacy: [address] [place] [address] to manage medications at discharge   Potential Assistance Purchasing Medications:  No  Does patient want to participate in local refill/meds to beds program?: Not Assessed    Notification completed in HENS/PAS? Yes     CM has completed HENS online through secure website for SNF admission at [address] [place] [address]. [employer] document ID #: [id number]     IMM  Yes     Case management has presented and reviewed [facility] letter #2 to the patient and/or family/ POA. Patient and/or family/POA verbalized understanding of their medicare rights and appeal process if needed. Patient and/or family/POA has signed, initialed and placed today's date (2019) and time () on [facility] letter #2 on the the appropriate lines.  Patient and/or family/POA, copy of letter offered and they are aware that this original copy of IMM letter #2 is available prior to discharge from the paper chart on the unit. Electronic documentation has been entered into epic for IMM letter #2 and original paper copy has been added to the paper chart at the nurses station. Discharge disposition: [facility] Phone: [phone] Fax: [phone]    Mode of Transport  Ambulance  Name of [address] [id number]  Number of Transport  [phone]  Time of Transport  2pm    Linda Face and his family were provided with choice of provider; he and his family are in agreement with the discharge plan.       Care Transitions patient: Yes    Christeen Favre, RN  [facility] [employer]  Case Management Department  Ph: [phone] Fax: [phone]

## 2019-05-14 NOTE — PROGRESS NOTES
Internal Medicine PGY-1 Resident Progress Note        PCP: Swati Mathis MD    Date of Admission: 5/5/2019    Chief Complaint: dizziness, eSOB    Subjective: Pt feels well. No CP, SOB, dizziness or other complaints. Medications:  Reviewed    Infusion Medications   Scheduled Medications    furosemide  60 mg Oral BID    pantoprazole  40 mg Oral QAM AC    allopurinol  100 mg Oral Daily    atorvastatin  40 mg Oral QAM    docusate sodium  100 mg Oral Daily    escitalopram  5 mg Oral Daily    fenofibrate  54 mg Oral Daily    levothyroxine  100 mcg Oral Daily    therapeutic multivitamin-minerals  1 tablet Oral Daily    vitamin D  1,000 Units Oral Daily    sodium chloride flush  10 mL Intravenous 2 times per day    diltiazem  120 mg Oral Daily    metoprolol succinate  100 mg Oral Daily     PRN Meds: hydrALAZINE, acetaminophen, ALPRAZolam, fluticasone, melatonin, polyethylene glycol, sodium chloride flush, magnesium hydroxide, ondansetron      Intake/Output Summary (Last 24 hours) at 5/14/2019 0843  Last data filed at 5/14/2019 0458  Gross per 24 hour   Intake 3024.89 ml   Output 350 ml   Net 2674.89 ml       Physical Exam Performed:    BP (!) 140/58   Pulse 60   Temp 97.5 °F (36.4 °C) (Oral)   Resp 18   Ht 5' 8\" (1.727 m)   Wt 123 lb 14.4 oz (56.2 kg)   SpO2 92%   BMI 18.84 kg/m²     General appearance: No apparent distress, appears stated age and cooperative. HEENT: Pupils equal, round, and reactive to light. Conjunctivae/corneas clear. Neck: Supple, with full range of motion. No jugular venous distention. Trachea midline. Respiratory:  Normal respiratory effort. Clear to auscultation, bilaterally without Rales/Wheezes/Rhonchi. Cardiovascular: Regular rate and rhythm with normal S1/S2 without murmurs, rubs or gallops. Abdomen: Soft, non-tender, non-distended with normal bowel sounds. Musculoskeletal: No clubbing, cyanosis or edema bilaterally. Full range of motion without deformity.   Skin: Skin color, texture, turgor normal.  No rashes or lesions. Neurologic:  Neurovascularly intact without any focal sensory/motor deficits. Cranial nerves: II-XII intact, grossly non-focal.  Psychiatric: Alert and oriented, thought content appropriate, normal insight  Capillary Refill: Brisk,< 3 seconds   Peripheral Pulses: +2 palpable, equal bilaterally       Labs:   Recent Labs     05/12/19 0453  05/13/19 0458 05/14/19  0124 05/14/19 0458   WBC 3.9*  --  5.3  --  5.1   HGB 7.9*   < > 8.5* 7.8* 7.8*   HCT 24.0*   < > 26.7* 24.4* 23.9*     --  150  --  159    < > = values in this interval not displayed. Recent Labs     05/12/19 0452 05/13/19 0458 05/14/19 0458   * 150* 139   K 3.4* 4.2 4.5    110 103   CO2 27 21 25   BUN 43* 35* 35*   CREATININE 1.5* 1.6* 1.6*   CALCIUM 8.3 8.4 7.9*     No results for input(s): AST, ALT, BILIDIR, BILITOT, ALKPHOS in the last 72 hours. No results for input(s): INR in the last 72 hours. No results for input(s): Willia Beverage in the last 72 hours. Urinalysis:      Lab Results   Component Value Date    NITRU Negative 04/12/2019    BLOODU Negative 04/12/2019    SPECGRAV 1.010 04/12/2019    GLUCOSEU Negative 04/12/2019    GLUCOSEU Negative 06/20/2011       Radiology:  FL MODIFIED BARIUM SWALLOW W VIDEO   Final Result      1. Silent aspiration with thin liquid barium. XR CHEST PORTABLE   Final Result   1. Worsening bilateral effusions and lower lung airspace disease suggesting pulmonary edema. XR CHEST STANDARD (2 VW)   Final Result   Impression:   1. Right pleural effusion and right lung consolidation decreased from 4/15/2019 chest x-ray.               Assessment/Plan:  Active Hospital Problems    Diagnosis Date Noted    Severe malnutrition ([address] [E43] 05/10/2019    Oropharyngeal dysphagia [R13.12]     Hypernatremia [E87.0]     Dyspnea on exertion [R06.09] 05/05/2019    Volume depletion [E86.9]     Chronic systolic heart failure ([facility] [address] [I50.22]     Dizziness [R42] 06/20/2011       Acute Blood Loss Anemia Likely 2/2 GI Bleed:  H&H q12h  Hgb > 8, 1 U PRBC on standby  AVM clipped x2 (5/12)  ASA, clopidogrel, heparin held  Hgb 7.8 this AM, will recheck in 3 hours     Hypernatremia  Na 139 this AM  Encouraging adequate fluid intake     Aspiration  1 episode (5/9)  MBS: silent aspiration on thin liquid  SLP onboard     Chronic Medical Problems:     Gout   -allopurinol      CAD s/p CABG  -hold plavix    -hold asa     Afib  -home diltiazem     HFrEF  -metoprolol with holding parameters     Hypothyroidism  -levothyroxine 100 mcg     HLD  -fenofibrate  -lipitor     Constipation  -colace  -miralax PRN     Anxiety  -lexapro  -xanax PRN     FEN: DIET RENAL; Low Sodium (2 GM)  Dietary Nutrition Supplements: Standard High Calorie Oral Supplement  PPx: lovenox   CODE: Limited  Dispo: GMF  PT/OT: consulted    I will discuss the patient with the senior resident and Malissa Jensen MD    [facility]  Internal Medicine MS4

## 2019-05-25 NOTE — H&P
History and Physical / Pre-Sedation Assessment    Patient:  Lilliana Bustillos   :   1925     Intended Procedure:  egd and colonoscopy    HPI: gi bleed    Past Medical History:   has a past medical history of Anemia, Anxiety, Atrial fibrillation ([facility] [address] CAD (coronary artery disease), CHF (congestive heart failure) ([facility] [address] Chronic renal insufficiency, Gout, History of BPH, Hyperlipidemia, Hypertension, and Hypothyroidism. Past Surgical History:   has a past surgical history that includes Pacemaker insertion (); Coronary artery bypass graft (); shoulder surgery (Right); Colon surgery (); Cardiac defibrillator placement; Upper gastrointestinal endoscopy (N/A, 2019); and Colonoscopy (2019). Medications:  Prior to Admission medications    Medication Sig Start Date End Date Taking? Authorizing Provider   furosemide (LASIX) 40 MG tablet Take 1.5 tablets by mouth 2 times daily 19 Yes Ashu Coto   ALPRAZolam Gerber Mare) 0.25 MG tablet Take 1 tablet by mouth daily as needed for Sleep for up to 60 days.  5/3/19 7/2/19 Yes Omar Martinez MD   escitalopram (LEXAPRO) 5 MG tablet TAKE 1 TABLET BY MOUTH DAILY TO HELP ANXIETY 19  Yes Jean-Pierre Torrez MD   fenofibrate (TRICOR) 54 MG tablet TAKE 1 TABLET BY MOUTH EVERY DAY 19  Yes Jean-Peirre Torrez MD   vitamin D (CHOLECALCIFEROL) 1000 UNIT TABS tablet Take 1,000 Units by mouth daily   Yes Historical Provider, MD   allopurinol (ZYLOPRIM) 100 MG tablet TAKE 1 TABLET BY MOUTH DAILY 18  Yes Jean-Pierre Torrez MD   Multiple Vitamins-Minerals (CENTRUM SILVER) TABS TAKE 1 TABLET BY MOUTH DAILY 18  Yes Jean-Pierre Torrez MD   levothyroxine (SYNTHROID) 100 MCG tablet TAKE 1 TABLET BY MOUTH DAILY 12/3/18  Yes Jean-Pierre Torrez MD   atorvastatin (LIPITOR) 40 MG tablet TAKE 1 TABLET BY MOUTH EVERY MORNING 10/1/18  Yes Jean-Pierre Torrez MD   metoprolol succinate (TOPROL XL) 100 MG extended release tablet Take 1 tablet by mouth daily 18  Yes CHANO Eden CNP   diltiazem (CARDIZEM CD) 120 MG extended release capsule TAKE 1 CAP BY MOUTH DAILY (DX: _________) 2/9/18  Yes Sveta Goodwin MD   famotidine (PEPCID) 20 MG tablet TAKE 1 TABLET BY MOUTH DAILY AS NEEDED FOR REFLUX 12/12/17  Yes Sveta Goodwin MD   Melatonin 5 MG CAPS TAKE 1 CAPSULE BY MOUTH NIGHTLY AS NEEDED FOR SLEEP 11/3/17  Yes Sveta Goodwin MD   fluticasone [place]) 50 MCG/ACT nasal spray USE 2SPRAYS BY NASAL ROUTE DAILY 11/3/17  Yes Sveta Goodwin MD   docusate sodium (COLACE, DULCOLAX) 100 MG CAPS Take 100 mg by mouth daily 4/19/19   Naomi Moe MD       Family History:  family history includes Dementia in his mother; Emphysema in his father; Other in his brother and father. Social History:   reports that he quit smoking about 44 years ago. He has never used smokeless tobacco. He reports that he drinks about 0.6 oz of alcohol per week. He reports that he does not use drugs. Allergies:  Lactose    ROS:  twelve point system review was unremarkable except for above noted history. Nurses notes reviewed and agreed. Medications reviewed    Physical Exam:  Vital Signs: BP (!) 137/52   Pulse 60   Temp 96.5 °F (35.8 °C) (Oral)   Resp 16   Ht 5' 8\" (1.727 m)   Wt 123 lb 14.4 oz (56.2 kg)   SpO2 94%   BMI 18.84 kg/m²    Pulmonary:Normal  Cardiac:Normal  Abdomen:Normal    Pre-Procedure Assessment / Plan:  ASA 2 - Patient with mild systemic disease with no functional limitations  Mallampati Airway Assessment:  Mallampati Class I - (soft palate, fauces, uvula & anterior/posterior tonsillar pillars are visible)  Level of Sedation Plan: Moderate sedation  Post Procedure plan: Return to same level of care    I assessed the patient and find that the patient is in satisfactory condition to proceed with the planned procedure and sedation plan. I have explained the risk, benefits, and alternatives to the procedure. The patient understands and agrees to proceed.   Viola Aparicio  12:17 PM 5/25/2019

## 2019-06-01 NOTE — CARE COORDINATION
ACC Note:  The RNCC spoke with the pt's wife. The wife stated she was able to visit with her  the last two days pt at [address]. The wife stated the pt stated he feels fine but is still weak. The wife stated the pt is in a wheelchair. The wife stated the pt loves PT although he did vomit today at therapy he felt better afterwards. The wife stated she has to arrange for transportation. The wife stated [employer] provides transportation but you have to male a reservation. The wife stated she was micky today because her son took her to visit today.

## 2019-06-26 NOTE — PROGRESS NOTES
[address]   Cardiac Consultation    Referring Provider:  Lalita Carias MD     CC:  ICMP, ICD implant     HPI:  Robert Cole is a 80 y.o. male with a h/o CAD, CABG, ICMP, NSVT, pAF, and s/p secondary prevention dual chamber [employer] ICD (8/28/14). He was seen in the hospital in 8/2016 for a wide complex tachycardia that appeared to be an AT with ventricular tracking. AV delay was prolonged and the upper tracking rate was changed along with the addition of amiodarone. Pt had low BP and dizziness, so his amiodarone and Lasix doses were decreased. Amiodarone was stopped 6/8/17. On 6/5/18, the VT zone on the ICD was reprogrammed from 167 to 140 to recognize and treat the slower episodes of VT. At 12/2018 OV, the mode was changed to MVP to minimize RV pacing.     He is here today in wheelchair, using continuous O2. He is very [place]. Interrogation today shows normally functioning ICD. AP 54%,  20%. AF burden 1.1%. Occasional brief NSVT noted. PVC burden of 4%. Optivol levels are up. ELVI at about 3 years. He states his breathing has improved. Denies complaints of palpitations, dizziness, CP, SOB, orthopnea, presyncope, or syncope. Past Medical History:   has a past medical history of Anemia, Anxiety, Atrial fibrillation (HCC), CAD (coronary artery disease), CHF (congestive heart failure) ([address] Chronic renal insufficiency, Gout, History of BPH, Hyperlipidemia, Hypertension, and Hypothyroidism. Surgical History:   has a past surgical history that includes Pacemaker insertion (2008); Coronary artery bypass graft (2001); shoulder surgery (Right); Colon surgery (2004); Cardiac defibrillator placement; Upper gastrointestinal endoscopy (N/A, 5/8/2019); and Colonoscopy (5/12/2019). Social History:   reports that he quit smoking about 44 years ago. He has never used smokeless tobacco. He reports that he drinks about 0.6 oz of alcohol per week. He reports that he does not use drugs. tablet TAKE 1 TABLET BY MOUTH DAILY AS NEEDED FOR REFLUX 30 tablet 0    [DISCONTINUED] vitamin D (CHOLECALCIFEROL) 1000 units TABS tablet Take 1,000 Units by mouth daily      [DISCONTINUED] Cholecalciferol (VITAMIN D) 2000 units CAPS capsule TAKE 1 CAPSULE BY MOUTH DAILY 30 capsule 2     No facility-administered encounter medications on file as of 6/27/2019. Allergies:  Lactose     Review of Systems   Constitutional: Negative. HENT: Negative. Eyes: Negative. Respiratory: Negative. Cardiovascular: Negative. Gastrointestinal: Negative. Genitourinary: Negative. Musculoskeletal: Negative. Skin: Negative. Neurological: Negative. Hematological: Negative. Psychiatric/Behavioral: Negative. BP (!) 132/34   Pulse 63   Ht 5' 8\" (1.727 m)   Wt 129 lb (58.5 kg)   BMI 19.61 kg/m²     Objective:  Physical Exam   Constitutional: He is oriented to person, place, and time. He appears well-developed and well-nourished. HENT:   Head: Normocephalic and atraumatic. Eyes: Pupils are equal, round, and reactive to light. Neck: Normal range of motion. Cardiovascular: Normal rate, regular rhythm and normal heart sounds. Pulmonary/Chest: Effort normal and breath sounds normal.   Abdominal: Soft. No tenderness. Musculoskeletal: Normal range of motion. He exhibits no edema. Neurological: He is alert and oriented to person, place, and time. Skin: Skin is warm and dry. Psychiatric: He has a normal mood and affect. EKG 6/27/19. Personally reviewed. SR with frequent PVC's    Echo 12/12/17  Summary   Note: rhythm is irregular.   Left ventricular size is normal . There is mild to moderate concentric left   ventricular hypertrophy. Left ventricular ejection fraction is probably   around 50%. Regional wall motion abnormalities cannot be 100% ruled out.  The   diastolic dysfunction is indeterminate due to heart rhythm.   Biatrial enlargement.   Mild-moderate mitral regurgitation is present.  [place] aortic regurgitation is present.  Alecia Peaches is moderate tricuspid regurgitation with RVSP estimated at 33 mmHg.   There is a pleural effusion. Assessment:  1. ICD, DDD - He had a high percentage of RV pacing.  went from 82% in 12/2018 to 20% today, after switching mode to MVP. 2. Chronic combined systolic and diastolic congestive heart failure (HCC)    3. Paroxysmal ventricular tachycardia - sustained. 4. Paroxysmal atrial fibrillation     5. CAD    6. Atrial tachycardia           Plan:  1. No changes to medication or with device. 2.  Continue with remote home transmissions every 3 months. 3.  Follow up in 1 year with device check. Blanche Whitehead RN, am scribing for and in the presence of Dr. Rashi Aguero. 06/27/19 10:56 AM  Amirah Nj RN    I, Dr. Rashi Aguero, personally performed the services described in this documentation as scribed by Amirah Nj RN in my presence, and it is both accurate and complete.       Rashi Aguero M.D.

## 2019-06-27 NOTE — PROGRESS NOTES
Interrogation and programming evaluation of the device shows normal function, with stable sensing and pacing thresholds. See interrogation for details. The pt saw Dr Bebeto Rodney today. Recheck remotely 3 mos.

## 2019-07-17 NOTE — CARE COORDINATION
by mouth 2 times daily  Patient taking differently: Take 20 mg by mouth 2 times daily Per directions it is 3 tablet once daily 5/14/19 6/27/19  Genene Pastel   docusate sodium (COLACE, DULCOLAX) 100 MG CAPS Take 100 mg by mouth daily 4/19/19   Jeffery Moss MD   fenofibrate (TRICOR) 54 MG tablet TAKE 1 TABLET BY MOUTH EVERY DAY 4/2/19   Bethanie Thomas MD   vitamin D (CHOLECALCIFEROL) 1000 UNIT TABS tablet Take 1,000 Units by mouth daily    Historical Provider, MD   allopurinol (ZYLOPRIM) 100 MG tablet TAKE 1 TABLET BY MOUTH DAILY 12/24/18   Bethanie Thomas MD   Multiple Vitamins-Minerals (CENTRUM SILVER) TABS TAKE 1 TABLET BY MOUTH DAILY 12/24/18   Bethanie Thomas MD   levothyroxine (SYNTHROID) 100 MCG tablet TAKE 1 TABLET BY MOUTH DAILY 12/3/18   Bethanie Thomas MD   metoprolol succinate (TOPROL XL) 100 MG extended release tablet Take 1 tablet by mouth daily 9/5/18   CHANO Warren CNP   diltiazem (CARDIZEM CD) 120 MG extended release capsule TAKE 1 CAP BY MOUTH DAILY (DX: _________) 2/9/18   Bethanie Thomas MD   famotidine (PEPCID) 20 MG tablet TAKE 1 TABLET BY MOUTH DAILY AS NEEDED FOR REFLUX 12/12/17   Bethanie Thomas MD   Melatonin 5 MG CAPS TAKE 1 CAPSULE BY MOUTH NIGHTLY AS NEEDED FOR SLEEP 11/3/17   Bethanie Thomas MD   fluticasone [facility]) 50 MCG/ACT nasal spray USE 2SPRAYS BY NASAL ROUTE DAILY 11/3/17   Bethanie Thomas MD       Future Appointments   Date Time Provider Gwen Perez   9/10/2019  1:45 PM MD BIRD Galan NEPH NAMITA BIRD [facility]   10/1/2019  4:50 AM SCHEDULE, [facility] REMOTE TRANSMISSION [facility] [employer]     ,   Congestive Heart Failure Assessment    Are you currently restricting fluids?:  No Restriction  Do you understand a low sodium diet?:  Yes  Do you understand how to read food labels?:  Yes  How many restaurant meals do you eat per week?:  0  Do you salt your food before tasting it?:  No     Shortness of breath (worse than baseline)      Symptoms:   CHF associated dyspnea on exertion: Pos, CHF associated

## 2019-08-20 NOTE — ED PROVIDER NOTES
MOUTH DAILY AS NEEDED FOR REFLUX, Disp-90 tablet, R-3**Patient requests 90 days supply**Normal      Melatonin 5 MG CAPS TAKE 1 CAPSULE BY MOUTH NIGHTLY AS NEEDED FOR SLEEP, Disp-90 capsule, R-3Normal      fluticasone (FLONASE) 50 MCG/ACT nasal spray USE 2SPRAYS BY NASAL ROUTE DAILY, Disp-3 Bottle, R-3**Patient requests 90 days supply**Normal             Allergies     He is allergic to lactose. Physical Exam     INITIAL VITALS: BP: (!) 141/46,Temp: 98.2 °F (36.8 °C), Pulse: 59, Resp: 19, SpO2: 100 %   Physical Exam   Constitutional: He is oriented to person, place, and time. No distress. HENT:   Head: Normocephalic and atraumatic. Right Ear: No hemotympanum. Left Ear: No hemotympanum. Mouth/Throat: Oropharynx is clear and moist.   Eyes: Pupils are equal, round, and reactive to light. Conjunctivae and EOM are normal.   Neck: Neck supple. No spinous process tenderness present. Cardiovascular: Normal rate, regular rhythm and normal heart sounds. Pulmonary/Chest: Effort normal and breath sounds normal. No stridor. No respiratory distress. He has no wheezes. He has no rales. Abdominal: Soft. Bowel sounds are normal. He exhibits no distension. There is no tenderness. There is no rebound and no guarding. Musculoskeletal: He exhibits no deformity. Left elbow: He exhibits normal range of motion, no swelling and no deformity. No tenderness found. Left upper arm: He exhibits no bony tenderness and no deformity. Left forearm: He exhibits no bony tenderness and no deformity. No midline thoracic or lumbar spinous process tenderness or step off deformity. Pelvis stable and non-tender. Negative log roll. Full ROM of bilateral upper and lower extremities. Neurological: He is alert and oriented to person, place, and time. Skin: Skin is warm and dry. He is not diaphoretic.   2 small 1 cm skin tears and 1 two cm skin tear to the left forearm.  There is a 5cm laceration/skin tear to the left

## 2019-08-21 NOTE — ED NOTES
Skin tears to left forearm cleansed with Hibiclens and irrigated with 30 ml of sterile water. Patient tolerated well. Bandage applied as ordered.      Marco Orta RN  08/20/19 2042

## 2019-09-11 NOTE — TELEPHONE ENCOUNTER
ALPRAZolam 0.25MG tablets  Approvedon September 5   PA Case: [id number], Status: Approved, Coverage Starts on: 9/5/2019 12:00:00 AM, Coverage Ends on: 3/3/2020 12:00:00 AM.   APPROVAL LETTER ATTACHED

## 2020-01-01 ENCOUNTER — APPOINTMENT (OUTPATIENT)
Dept: GENERAL RADIOLOGY | Age: 85
End: 2020-01-01
Payer: MEDICARE

## 2020-01-01 ENCOUNTER — HOSPITAL ENCOUNTER (EMERGENCY)
Age: 85
Discharge: HOME | End: 2020-01-12
Attending: EMERGENCY MEDICINE
Payer: MEDICARE

## 2020-01-01 ENCOUNTER — TELEPHONE (OUTPATIENT)
Dept: INTERNAL MEDICINE CLINIC | Age: 85
End: 2020-01-01

## 2020-01-01 ENCOUNTER — APPOINTMENT (OUTPATIENT)
Dept: CT IMAGING | Age: 85
End: 2020-01-01
Payer: MEDICARE

## 2020-01-01 VITALS
TEMPERATURE: 97.5 F | DIASTOLIC BLOOD PRESSURE: 45 MMHG | RESPIRATION RATE: 16 BRPM | HEART RATE: 59 BPM | OXYGEN SATURATION: 95 % | SYSTOLIC BLOOD PRESSURE: 154 MMHG

## 2020-01-01 DIAGNOSIS — F41.9 ANXIETY: Primary | ICD-10-CM

## 2020-01-01 PROCEDURE — 73070 X-RAY EXAM OF ELBOW: CPT

## 2020-01-01 PROCEDURE — 70450 CT HEAD/BRAIN W/O DYE: CPT

## 2020-01-01 PROCEDURE — 72125 CT NECK SPINE W/O DYE: CPT

## 2020-01-01 PROCEDURE — 73502 X-RAY EXAM HIP UNI 2-3 VIEWS: CPT

## 2020-01-01 PROCEDURE — 99284 EMERGENCY DEPT VISIT MOD MDM: CPT

## 2020-01-01 PROCEDURE — 73080 X-RAY EXAM OF ELBOW: CPT

## 2020-01-01 PROCEDURE — 73560 X-RAY EXAM OF KNEE 1 OR 2: CPT

## 2020-01-01 RX ORDER — ALPRAZOLAM 0.25 MG/1
TABLET ORAL
Qty: 30 TABLET | Refills: 2 | Status: SHIPPED | OUTPATIENT
Start: 2020-01-01 | End: 2020-01-01

## 2020-01-01 RX ORDER — LEVOTHYROXINE SODIUM 0.1 MG/1
100 TABLET ORAL DAILY
Qty: 90 TABLET | Refills: 1 | Status: SHIPPED | OUTPATIENT
Start: 2020-01-01

## 2020-01-01 RX ORDER — METOPROLOL SUCCINATE 50 MG/1
50 TABLET, EXTENDED RELEASE ORAL DAILY
Qty: 30 TABLET | Refills: 5
Start: 2020-01-01

## 2020-01-01 RX ORDER — DILTIAZEM HYDROCHLORIDE 120 MG/1
CAPSULE, COATED, EXTENDED RELEASE ORAL
Qty: 90 CAPSULE | Refills: 3 | Status: SHIPPED | OUTPATIENT
Start: 2020-01-01

## 2020-01-01 RX ORDER — ATORVASTATIN CALCIUM 40 MG/1
40 TABLET, FILM COATED ORAL EVERY MORNING
Qty: 90 TABLET | Refills: 1 | Status: SHIPPED | OUTPATIENT
Start: 2020-01-01

## 2020-01-01 RX ORDER — ESCITALOPRAM OXALATE 5 MG/1
TABLET ORAL
Qty: 90 TABLET | Refills: 1 | Status: SHIPPED | OUTPATIENT
Start: 2020-01-01

## 2020-01-01 ASSESSMENT — ENCOUNTER SYMPTOMS
SHORTNESS OF BREATH: 0
ABDOMINAL PAIN: 0
CHEST TIGHTNESS: 0
COUGH: 0
DIARRHEA: 0
NAUSEA: 0
WHEEZING: 0
VOMITING: 0

## 2020-01-12 NOTE — ED PROVIDER NOTES
ED Attending Attestation Note     Date of evaluation: 1/12/2020    This patient was seen by the advance practice provider. I have seen and examined the patient, agree with the workup, evaluation, management and diagnosis. The care plan has been discussed. My assessment reveals patient here for a mechanical fall. He fell face forward. ?LOC  On exam large hematoma /abrasion left eyebrow area. Skin tear right elbow but FROM w/o pain. Awake alert and nno focal signs.       Arley Tirado MD  01/12/20 3252

## 2020-01-12 NOTE — ED PROVIDER NOTES
Disp-60 tablet, R-1Normal      polyethylene glycol (GLYCOLAX) powder Take 17 g by mouth dailyHistorical Med      vitamin D (CHOLECALCIFEROL) 1000 UNIT TABS tablet Take 1,000 Units by mouth dailyHistorical Med      famotidine (PEPCID) 20 MG tablet TAKE 1 TABLET BY MOUTH DAILY AS NEEDED FOR REFLUX, Disp-90 tablet, R-3**Patient requests 90 days supply**Normal      Melatonin 5 MG CAPS TAKE 1 CAPSULE BY MOUTH NIGHTLY AS NEEDED FOR SLEEP, Disp-90 capsule, R-3Normal      fluticasone (FLONASE) 50 MCG/ACT nasal spray USE 2SPRAYS BY NASAL ROUTE DAILY, Disp-3 Bottle, R-3**Patient requests 90 days supply**Normal             Allergies     He is allergic to lactose. Physical Exam     INITIAL VITALS: BP: (!) 182/54, Temp: 97.5 °F (36.4 °C), Pulse: 59, Resp: 16, SpO2: 95 %  Physical Exam  Vitals signs and nursing note reviewed. Constitutional:       General: He is not in acute distress. Appearance: He is well-developed. He is not diaphoretic. HENT:      Head: Normocephalic and atraumatic. Eyes:      Conjunctiva/sclera: Conjunctivae normal.      Pupils: Pupils are equal, round, and reactive to light. Neck:      Musculoskeletal: Normal range of motion. No muscular tenderness. Cardiovascular:      Rate and Rhythm: Normal rate and regular rhythm. Heart sounds: Normal heart sounds. No murmur. No friction rub. Pulmonary:      Effort: Pulmonary effort is normal. No respiratory distress. Breath sounds: Normal breath sounds. No wheezing or rales. Musculoskeletal: Normal range of motion. Comments: Overall normal musculoskeletal examination. Mild tenderness over right olecranon process, good ROM without difficulty. Median, Radial, Ulnar innervation intact. Left hip with mild tenderness to palpation over the greater trochanter, negative logroll. Right knee without any tenderness to palpation, good range of motion. DP/PT pulses present bilaterally. Skin:     General: Skin is warm and dry.       Comments: Skin tear right elbow and bilateral knees   Neurological:      Mental Status: He is alert and oriented to person, place, and time. Psychiatric:         Behavior: Behavior normal.         Thought Content: Thought content normal.         Judgment: Judgment normal.       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 CT Cervical Spine WO Contrast   Final Result   Impression:       1. No acute fracture or traumatic malalignment of the cervical spine. 2. Multilevel moderate-advanced degenerative arthropathy of the cervical spine. CT Head WO Contrast   Final Result   Impression:       1. No acute intracranial abnormality. Specifically, no evidence of intracranial hemorrhage. 2. Large left para midline frontal scalp contusion without underlying skull fracture. XR HIP 2-3 VW W PELVIS LEFT   Final Result   Impression: No acute fracture or bony malalignment of the pelvis or left hip. Degenerative arthropathy of both hips, slightly more prominent on the left. XR ELBOW RIGHT (MIN 3 VIEWS)   Final Result   Impression: While there is no obvious fracture of the right elbow, one of the projections produces the radial head in an odd alignment with the distal humerus. If there is clinical concern for acute bony injury would recommend obtaining radial head view    radiographs. XR KNEE RIGHT (1-2 VIEWS)   Final Result   Impression: Moderate tricompartmental degenerative arthropathy of the right knee. LABS:   No results found for this visit on 01/12/20. ED BEDSIDE ULTRASOUND:  none    RECENT VITALS:  BP: (!) 154/45, Temp: 97.5 °F (36.4 °C), Pulse: 59, Resp: 16, SpO2: 95 %     Procedures     none    ED Course     Nursing Notes, Past Medical Hx,Past Surgical Hx, Social Hx, Allergies, and Family Hx were reviewed.     The patient was given the following medications:  No orders of the defined types were placed in this encounter. CONSULTS:  None    MEDICAL DECISION MAKING / ASSESSMENT / Annalisa Arita is a 80 y.o. male presenting with a mechanical fall with head injury. On arrival, patient is hemodynamically stable and in no acute distress. He does have a large hematoma to the left frontal forehead with associated abrasion. He is no cervical spinous process tenderness. Full range of motion to the neck is present. Cardiopulmonary evaluation is unremarkable. Abdomen is benign. No chest wall tenderness. Patient does have skin tears over the bilateral knees as well as the right elbow. Complaining of mild pain in the right elbow, no tenderness to palpation, full range of motion is present. Plain films with questional process over the radial head, repeat images of these revealed no acute findings. X-ray of the right knee is unremarkable, x-ray of the left hip is also unremarkable. CT scan of the head and cervical spine are unremarkable as well other than the large hematoma present. Skin tears were dressed by RN. family did present to the emergency department and we discussed further obtaining blood work. Patient son is a physician and elected not to have blood work at this time as patient is currently hospice and it would not  at all. Return precautions to the emergency department were discussed and agreed upon. Discharged in stable condition via BLS    This patient was also evaluated by the attending physician. All care plans were discussed and agreed upon. Clinical Impression     1. Fall, initial encounter    2. Hematoma of scalp, initial encounter    3.  Skin tear of right elbow without complication, initial encounter        Disposition     PATIENT REFERRED TO:  Dav Stoll MD  [address] [facility] Emergency Department  [address] [facility]

## 2020-04-17 ENCOUNTER — TELEPHONE (OUTPATIENT)
Dept: INTERNAL MEDICINE CLINIC | Age: 85
End: 2020-04-17

## 2020-04-17 NOTE — TELEPHONE ENCOUNTER
Patient  at 2:20 PM on 2020. Patient was enrolled in hospice (CHF). Dr Townsend Collet was on call and was notified.

## 2021-04-18 NOTE — ED TRIAGE NOTES
Pt is alert and oriented times four. Pt here today for SOB. Pt brought in by squad from [facility]isted [facility]. Pt states that he is more SOB with activity. Pt denies CP at this time. Pt states been going on since he has been here 2 weeks ago. Pt placed on monitor. Call light in reach will continue to monitor. ED post discharge courtesy call

## 2022-01-17 NOTE — PLAN OF CARE
Problem: Falls - Risk of:  Goal: Will remain free from falls  Description  Will remain free from falls  5/11/2019 0605 by Harshad Brown RN  Outcome: Ongoing  Free of falls this shift. Pt continues to be at  risk for falls. Fall precautions in place. Bed in lowest position, wheels locked. Bed alarm on high volume. Fall sign posted. Fall risk bracelet on. Non-skid footwear in place. Fall cloth tied to bed. Call light within reach. Pt A&Ox4 and aware of needs. Will continue to monitor closely and assess for changes. Problem: Cardiac:  Goal: Ability to maintain vital signs within normal range will improve  Description  Ability to maintain vital signs within normal range will improve  5/11/2019 0605 by Harshad Brown RN  Outcome: Ongoing  VSS throughout shift thus far. Denies CP, lightheadedness, or SOB. Small amount of blood noted per rectum, Hgb remains stable at this time. Possible colonoscopy today if pt agreeable with prep. Will continue to monitor. Diabetes

## 2023-02-22 NOTE — CARE COORDINATION
"Subjective   Pablo Pina is a 66 y.o. male. Shoulder pain (left)    History of Present Illness   Patient presents today for left shoulder pain. Pain started around 1 week ago. Denies any injuries. Reports repetitive activity \"pulling on chains helping log the woods\". Muscle relaxer and aleve have not been helpful. He says pain is mostly on posterior side of his shoulder and radiating down his arm. Laying down at night seems to provide the most pain relief.    The following portions of the patient's history were reviewed and updated as appropriate: allergies, current medications, past family history, past medical history, past social history, past surgical history, and problem list.    Review of Systems   Constitutional: Negative for chills, fatigue and fever.   HENT: Negative for congestion, ear pain, rhinorrhea and sore throat.    Eyes: Negative for blurred vision, double vision and visual disturbance.   Respiratory: Negative for cough, chest tightness, shortness of breath and wheezing.    Cardiovascular: Negative for chest pain, palpitations and leg swelling.   Gastrointestinal: Negative for abdominal pain, diarrhea, nausea and vomiting.   Endocrine: Negative for cold intolerance and heat intolerance.   Genitourinary: Negative for difficulty urinating, dysuria, frequency and hematuria.   Musculoskeletal: Positive for arthralgias (left shoulder pain). Negative for back pain, neck pain and neck stiffness.   Skin: Negative for dry skin, pallor, rash, skin lesions and wound.   Allergic/Immunologic: Negative for environmental allergies, food allergies and immunocompromised state.   Neurological: Negative for dizziness, syncope, weakness, light-headedness, headache and confusion.   Hematological: Negative for adenopathy. Does not bruise/bleed easily.   Psychiatric/Behavioral: Negative for self-injury, sleep disturbance, suicidal ideas, depressed mood and stress. The patient is not nervous/anxious.        Vitals: "    02/22/23 0959   BP: 121/74   Pulse: 69   Temp: 97.5 °F (36.4 °C)   SpO2: 97%     Body mass index is 35.87 kg/m².    Objective   Physical Exam  Vitals and nursing note reviewed.   Constitutional:       Appearance: He is well-developed.   HENT:      Head: Normocephalic.   Eyes:      Conjunctiva/sclera: Conjunctivae normal.   Musculoskeletal:         General: Normal range of motion.      Left shoulder: Normal.        Arms:       Cervical back: Full passive range of motion without pain, normal range of motion and neck supple.   Skin:     General: Skin is warm and dry.   Neurological:      Mental Status: He is alert and oriented to person, place, and time.      Coordination: Coordination normal.      Gait: Gait normal.   Psychiatric:         Speech: Speech normal.         Behavior: Behavior normal. Behavior is cooperative.         Thought Content: Thought content normal.         Judgment: Judgment normal.           Assessment & Plan   Diagnoses and all orders for this visit:    1. Acute pain of left shoulder (Primary)  -     naproxen (Naprosyn) 500 MG tablet; Take 1 tablet by mouth 2 (Two) Times a Day With Meals.  Dispense: 60 tablet; Refill: 0      Symptoms consistent with muscle strain.  Exam of shoulder joint unremarkable.  Recommended rest, ice, heat.   No repetitive activities, avoid aggravating factors.  Take naproxen 500 mg BID AC to help reduce inflammation/pain.  Pt educated that it will take several weeks to heal, pain should slowly improve.    If symptoms do not improve or worsen, patient was instructed to return to clinic for further evaluation.         This document has been electronically signed by COLLEEN Akins on  February 22, 2023 12:47 CST            famotidine (PEPCID) 20 MG tablet TAKE 1 TABLET BY MOUTH DAILY AS NEEDED FOR REFLUX 12/12/17   Luther Mccormick MD   aspirin 81 MG EC tablet Take 1 tablet by mouth daily 11/8/17   Luther Mccormick MD   clopidogrel (PLAVIX) 75 MG tablet Take 1 tablet by mouth daily 11/8/17   Luther Mccormick MD   Acetaminophen (APAP) 325 MG TABS Take 650 mg by mouth every 6 hours as needed for Pain or Fever 11/8/17   Luther Mccormick MD   Melatonin 5 MG CAPS TAKE 1 CAPSULE BY MOUTH NIGHTLY AS NEEDED FOR SLEEP 11/3/17   Luther Mccormick MD   fluticasone [facility]) 50 MCG/ACT nasal spray USE 2SPRAYS BY NASAL ROUTE DAILY 11/3/17   Omar Martinez MD   fish oil-omega-3 fatty acids 1000 MG capsule Take 2 g by mouth daily.     Historical Provider, MD       Future Appointments  Date Time Provider Gwen Perez   2/5/2019 1:00 PM Arvind Andrew MD [facility]   2/14/2019 9:40 AM Luther Mccormick MD [facility]   3/5/2019 8:00 PM SCHEDULE, [facility] PHONE TRANSMISSION [place]     ,   Congestive Heart Failure Assessment    Are you currently restricting fluids?:  No Restriction  Do you understand a low sodium diet?:  Yes  Do you understand how to read food labels?:  Yes  How many restaurant meals do you eat per week?:  0  Do you salt your food before tasting it?:  No     No patient-reported symptoms      Symptoms:   None:  Yes      Symptom course:  stable  Weight trend:  stable  Salt intake watch compared to last visit:  stable      and   General Assessment    Do you have any symptoms that are causing concern?:  No

## 2023-08-27 NOTE — PROGRESS NOTES
GI Progress Note    Michelle Grullon is a 80 y.o. male patient. Acute anemia of blood loss  CHF  CKD  CAD  S/p CABG  atrial fibrillation    Admit Date: 5/5/2019    Subjective:        Had three episodes of melena  No abdominal pain  No nausea or vomiting  Off plavix        Scheduled Meds:   pantoprazole  40 mg Oral QAM AC    allopurinol  100 mg Oral Daily    atorvastatin  40 mg Oral QAM    docusate sodium  100 mg Oral Daily    escitalopram  5 mg Oral Daily    fenofibrate  54 mg Oral Daily    levothyroxine  100 mcg Oral Daily    therapeutic multivitamin-minerals  1 tablet Oral Daily    vitamin D  1,000 Units Oral Daily    sodium chloride flush  10 mL Intravenous 2 times per day    diltiazem  120 mg Oral Daily    metoprolol succinate  100 mg Oral Daily    iron sucrose  100 mg Intravenous Q24H           PRN Meds:  acetaminophen, ALPRAZolam, fluticasone, melatonin, polyethylene glycol, sodium chloride flush, magnesium hydroxide, ondansetron      Objective:       Patient Vitals for the past 24 hrs:   BP Temp Temp src Pulse Resp SpO2 Weight   05/07/19 2009 (!) 125/49 97.6 °F (36.4 °C) Oral 69 18 94 % --   05/07/19 1513 (!) 101/58 97.1 °F (36.2 °C) Oral 97 18 96 % --   05/07/19 1144 (!) 144/64 97.9 °F (36.6 °C) Oral 65 18 96 % --   05/07/19 1104 (!) 134/46 97.1 °F (36.2 °C) Oral 62 18 97 % --   05/07/19 1004 (!) 147/55 98 °F (36.7 °C) Oral 63 18 99 % --   05/07/19 0914 (!) 144/72 97.1 °F (36.2 °C) Axillary 76 18 99 % --   05/07/19 0911 128/60 97 °F (36.1 °C) Oral 74 18 95 % --   05/07/19 0905 (!) 108/56 97.8 °F (36.6 °C) Oral 71 18 96 % --   05/07/19 0846 (!) 110/49 97.4 °F (36.3 °C) Oral 68 18 95 % --   05/07/19 0818 107/62 97.3 °F (36.3 °C) Oral 68 16 96 % --   05/07/19 0559 128/62 97.3 °F (36.3 °C) Oral 58 16 96 % 121 lb 7.6 oz (55.1 kg)   05/06/19 2345 124/60 97.4 °F (36.3 °C) Oral 58 16 93 % --       Exam:    VITALS:  BP (!) 125/49   Pulse 69   Temp 97.6 °F (36.4 °C) (Oral)   Resp 18   Ht 5' 8\" (1.727 m) Wt 121 lb 7.6 oz (55.1 kg)   SpO2 94%   BMI 18.47 kg/m²   TEMPERATURE:  Current - Temp: 97.6 °F (36.4 °C); Max - Temp  Av.4 °F (36.3 °C)  Min: 97 °F (36.1 °C)  Max: 98 °F (36.7 °C)    NAD  General appearance: alert, appears stated age, cooperative and no distress  Head: Normocephalic, without obvious abnormality, atraumatic  Neck: supple, symmetrical, trachea midline and thyroid not enlarged, symmetric, no tenderness/mass/nodules  CVS:  RRR, Nl s1s2  Lungs CTA Bilaterally, normal effort  Abdomen: soft, non-tender; bowel sounds normal; no masses,  no organomegaly  AAOx3, No asterixis or encephalopathy  Extremities: No edema. Lab Data:  Recent Labs     19  0528 19  0509 19  0658 19  1257 19  1753   WBC 5.4 5.4  --   --  9.6   HGB 8.6* 7.1* 7.1* 8.4* 9.0*   HCT 26.6* 21.8* 22.1* 26.5* 27.5*   MCV 91.5 92.8  --   --  92.6    178  --   --  199     Recent Labs     19  0731 19  0528 19  0509    143 144   K 3.7 4.3 3.7   CL 99 104 106   CO2 26 26 25   BUN 87* 86* 93*   CREATININE 2.3* 2.6* 2.3*     Recent Labs     19  0731   AST 19   ALT 8*   BILITOT 0.4   ALKPHOS 45     No results for input(s): LIPASE, AMYLASE in the last 72 hours. Recent Labs     19  0731   PROT 6.6   INR 1.12     No results for input(s): PTT in the last 72 hours. No results for input(s): OCCULTBLD in the last 72 hours. Radiology review: as in record    Assessment:       Active Problems:    Dizziness    Chronic systolic heart failure (HCC)    Dyspnea on exertion    Volume depletion  Resolved Problems:    * No resolved hospital problems.  *  acute blood loss anemia    Melena this pm    Recommendations:       Npo  Stat h and h  egd in am    Discussed with CHELA Toribio MD  2019  9:44 PM None

## (undated) DEVICE — HEMOSPRAY ENDOSCOPIC HEMOSTAT: Brand: HEMOSPRAY